# Patient Record
Sex: FEMALE | Race: BLACK OR AFRICAN AMERICAN | Employment: FULL TIME | URBAN - METROPOLITAN AREA
[De-identification: names, ages, dates, MRNs, and addresses within clinical notes are randomized per-mention and may not be internally consistent; named-entity substitution may affect disease eponyms.]

---

## 2017-01-24 ENCOUNTER — OFFICE VISIT (OUTPATIENT)
Dept: FAMILY MEDICINE CLINIC | Age: 52
End: 2017-01-24

## 2017-01-24 VITALS
HEIGHT: 66 IN | DIASTOLIC BLOOD PRESSURE: 84 MMHG | TEMPERATURE: 97 F | WEIGHT: 180 LBS | BODY MASS INDEX: 28.93 KG/M2 | RESPIRATION RATE: 16 BRPM | SYSTOLIC BLOOD PRESSURE: 120 MMHG | HEART RATE: 67 BPM | OXYGEN SATURATION: 100 %

## 2017-01-24 DIAGNOSIS — J06.9 VIRAL UPPER RESPIRATORY TRACT INFECTION: ICD-10-CM

## 2017-01-24 DIAGNOSIS — I10 ESSENTIAL HYPERTENSION: Primary | ICD-10-CM

## 2017-01-24 DIAGNOSIS — R73.9 ELEVATED BLOOD SUGAR: ICD-10-CM

## 2017-01-24 DIAGNOSIS — Z13.220 SCREENING FOR CHOLESTEROL LEVEL: ICD-10-CM

## 2017-01-24 RX ORDER — CHLORTHALIDONE 25 MG/1
TABLET ORAL DAILY
COMMUNITY
End: 2017-03-22

## 2017-01-24 RX ORDER — RAMIPRIL 10 MG/1
10 CAPSULE ORAL DAILY
Qty: 30 CAP | Refills: 6 | Status: SHIPPED | OUTPATIENT
Start: 2017-01-24 | End: 2017-01-24 | Stop reason: SDUPTHER

## 2017-01-24 NOTE — PROGRESS NOTES
HISTORY OF PRESENT ILLNESS  Fer Parr is a 54 y.o. female here for follow-up on hypertension hyperglycemia and hyperlipidemia. . [facility] 25 mg daily. She is currently complaining of some nasal congestion and sore throat   Cholesterol Problem   The history is provided by the medical records. This is a chronic problem. The problem has not changed since onset. Pertinent negatives include no chest pain, no abdominal pain, no headaches and no shortness of breath. The symptoms are aggravated by eating. Nothing relieves the symptoms. She has tried nothing for the symptoms. Hypertension    The history is provided by the patient and medical records. This is a chronic problem. The problem has been gradually improving. Pertinent negatives include no chest pain, no orthopnea, no headaches, no neck pain, no peripheral edema, no dizziness, no shortness of breath and no nausea. There are no associated agents to hypertension. Risk factors include dyslipidemia. High Blood Sugar   The history is provided by the patient and medical records. This is a chronic problem. The problem has not changed since onset. Pertinent negatives include no chest pain, no abdominal pain, no headaches and no shortness of breath. The symptoms are aggravated by eating. Nothing relieves the symptoms. She has tried nothing for the symptoms. URI    The history is provided by the patient and medical records. This is a chronic problem. The problem has been gradually improving. Associated symptoms include sneezing and sore throat. Pertinent negatives include no chest pain, no abdominal pain, no diarrhea, no nausea, no ear pain, no headaches, no joint swelling and no neck pain. She has tried nothing for the symptoms.    No Known Allergies  Current Outpatient Prescriptions on File Prior to Visit   Medication Sig Dispense Refill    dicyclomine (BENTYL) 20 mg tablet Take 1 Tab by mouth three (3) times daily as needed. 90 Tab 2    naproxen (NAPROSYN) 500 mg tablet Take 1 Tab by mouth two (2) times daily (with meals). 14 Tab 0     No current facility-administered medications on file prior to visit. Past Medical History   Diagnosis Date    Elevated blood pressure     Hypercholesterolemia     IBS (irritable bowel syndrome)      Past Surgical History   Procedure Laterality Date    Hx cholecystectomy      Hx hysterectomy       Family History   Problem Relation Age of Onset    Hypertension Mother     Diabetes Mother     Elevated Lipids Mother     Hypertension Father    Zelda Miners Elevated Lipids Father     Hypertension Sister     Diabetes Sister     Hypertension Brother     Hypertension Sister     Elevated Lipids Sister      Social History     Social History    Marital status:      Spouse name: N/A    Number of children: N/A    Years of education: N/A     Occupational History    Not on file. Social History Main Topics    Smoking status: Never Smoker    Smokeless tobacco: Never Used    Alcohol use No    Drug use: No    Sexual activity: Yes     Partners: Male     Other Topics Concern    Not on file     Social History Narrative     . Review of Systems   Constitutional: Negative. HENT: Positive for sneezing and sore throat. Negative for ear pain. Respiratory: Negative. Negative for shortness of breath. Cardiovascular: Negative. Negative for chest pain and orthopnea. Gastrointestinal: Negative for abdominal pain, diarrhea and nausea. Musculoskeletal: Negative. Negative for neck pain. Neurological: Negative. Negative for dizziness and headaches. Endo/Heme/Allergies: Negative. Psychiatric/Behavioral: Negative.       Visit Vitals    /84 (BP 1 Location: Right arm, BP Patient Position: Sitting)    Pulse 67    Temp 97 °F (36.1 °C) (Oral)    Resp 16    Ht 5' 5.75\" (1.67 m)    Wt 180 lb (81.6 kg)    SpO2 100%    BMI 29.27 kg/m2       Physical Exam Constitutional: She is oriented to person, place, and time. She appears well-developed and well-nourished. HENT:   Head: Normocephalic and atraumatic. Cardiovascular: Normal rate, regular rhythm, normal heart sounds and intact distal pulses. Exam reveals no gallop and no friction rub. No murmur heard. Pulmonary/Chest: Effort normal and breath sounds normal. No respiratory distress. She has no wheezes. She has no rales. Musculoskeletal: She exhibits no edema. Neurological: She is alert and oriented to person, place, and time. No cranial nerve deficit. Psychiatric: She has a normal mood and affect. Her behavior is normal. Judgment and thought content normal.   Nursing note and vitals reviewed. ASSESSMENT and PLAN    ICD-10-CM ICD-9-CM    1. Essential hypertension I10 401.9    2. Elevated blood sugar R73.9 790.29    3. Screening for cholesterol level Z13.220 V77.91    4. Viral upper respiratory tract infection J06.9 465.9     B97.89       Follow-up Disposition:  Return in about 4 weeks (around 2/21/2017).

## 2017-01-24 NOTE — MR AVS SNAPSHOT
Visit Information Date & Time Provider Department Dept. Phone Encounter #  
 1/24/2017  3:00 PM Moses Finnegan MD [facility] [phone] [id number] Follow-up Instructions Return in about 4 weeks (around 2/21/2017). Upcoming Health Maintenance Date Due DTaP/Tdap/Td series (1 - Tdap) 7/17/1988 PAP AKA CERVICAL CYTOLOGY 7/17/1988 Allergies as of 1/24/2017  Review Complete On: 1/24/2017 By: Moses Finnegan MD  
 No Known Allergies Current Immunizations  Never Reviewed No immunizations on file. Not reviewed this visit You Were Diagnosed With   
  
 Codes Comments Essential hypertension    -  Primary ICD-10-CM: I10 
ICD-9-CM: 401.9 Elevated blood sugar     ICD-10-CM: R73.9 ICD-9-CM: 790.29 Screening for cholesterol level     ICD-10-CM: Z13.220 ICD-9-CM: V77.91 Viral upper respiratory tract infection     ICD-10-CM: J06.9, B97.89 ICD-9-CM: 465.9 Vitals BP Pulse Temp Resp Height(growth percentile) Weight(growth percentile) 120/84 (BP 1 Location: Right arm, BP Patient Position: Sitting) 67 97 °F (36.1 °C) (Oral) 16 5' 5.75\" (1.67 m) 180 lb (81.6 kg) SpO2 BMI OB Status Smoking Status 100% 29.27 kg/m2 Menopause Never Smoker Vitals History BMI and BSA Data Body Mass Index Body Surface Area  
 29.27 kg/m 2 1.95 m 2 Preferred Pharmacy Pharmacy Name Phone [facility] [phone] [facility] 18 [phone] Your Updated Medication List  
  
   
This list is accurate as of: 1/24/17  4:10 PM.  Always use your most recent med list.  
  
  
  
  
 chlorthalidone 25 mg tablet Commonly known as:  Tiffany Pulling Take  by mouth daily. dicyclomine 20 mg tablet Commonly known as:  BENTYL Take 1 Tab by mouth three (3) times daily as needed. naproxen 500 mg tablet Commonly known as:  NAPROSYN Take 1 Tab by mouth two (2) times daily (with meals). ramipril 10 mg capsule Commonly known as:  ALTACE Take 1 Cap by mouth daily. Prescriptions Sent to Pharmacy Refills  
 ramipril (ALTACE) 10 mg capsule 6 Sig: Take 1 Cap by mouth daily. Class: Normal  
 Pharmacy: [facility] [address] Ph #: [phone] Route: Oral  
  
Follow-up Instructions Return in about 4 weeks (around 2/21/2017). Patient Instructions Purchase Coricidin HBP for nasal congestion Introducing [facility]! Francis Jose Angeljovan introduces [employer] patient portal. Now you can access parts of your medical record, email your doctor's office, and request medication refills online. 1. In your internet browser, go to [email or web address] 2. Click on the First Time User? Click Here link in the Sign In box. You will see the New Member Sign Up page. 3. Enter your Nit[employer]x Access Code exactly as it appears below. You will not need to use this code after youve completed the sign-up process. If you do not sign up before the expiration date, you must request a new code. · [employer]x Access Code: Karon Chamorro Expires: 2/5/2017  6:34 PM 
 
4. Enter the last four digits of your Social Security Number (xxxx) and Date of Birth (mm/dd/yyyy) as indicated and click Submit. You will be taken to the next sign-up page. 5. Create a "[email or web address]"t ID. This will be your Nitronex login ID and cannot be changed, so think of one that is secure and easy to remember. 6. Create a N[employer]x password. You can change your password at any time. 7. Enter your Password Reset Question and Answer. This can be used at a later time if you forget your password. 8. Enter your e-mail address. You will receive e-mail notification when new information is available in [address] 9. Click Sign Up.  You can now view and download portions of your medical record. 10. Click the Download Summary menu link to download a portable copy of your medical information. If you have questions, please visit the Frequently Asked Questions section of the [employer] website. Remember, [employer] is NOT to be used for urgent needs. For medical emergencies, dial 911. Now available from your iPhone and Android! Please provide this summary of care documentation to your next provider. Your primary care clinician is listed as Arlene Coronado. If you have any questions after today's visit, please call [phone].

## 2017-01-24 NOTE — PROGRESS NOTES
Amber Romo is a 54 y.o. female presents to office for follow up on high blood sugar, HTN and hyperlipidemia. 1. Have you been to the ER, urgent care clinic or hospitalized since your last visit? Yes 1/12/2017 for high blood pressure  2. Have you seen any other providers outside of [facility] since your last visit? no  3. Have you had a Flu shot this year?  No-Patient declined        Health Maintenance items with a due date reviewed with patient:  Health Maintenance Due   Topic Date Due    DTaP/Tdap/Td series (1 - Tdap) 07/17/1988    PAP AKA CERVICAL CYTOLOGY  07/17/1988    INFLUENZA AGE 9 TO ADULT  08/01/2016

## 2017-01-25 LAB
A-G RATIO,AGRAT: 1.3 RATIO (ref 1.1–2.6)
ABSOLUTE LYMPHOCYTE COUNT, 10803: 2.4 K/UL (ref 1–4.8)
ALBUMIN SERPL-MCNC: 4.3 G/DL (ref 3.5–5)
ALP SERPL-CCNC: 88 U/L (ref 25–115)
ALT SERPL-CCNC: 11 U/L (ref 5–40)
ANION GAP SERPL CALC-SCNC: 17 MMOL/L
AST SERPL W P-5'-P-CCNC: 18 U/L (ref 10–37)
AVG GLU, 10930: 135 MG/DL (ref 91–123)
BASOPHILS # BLD: 0 K/UL (ref 0–0.2)
BASOPHILS NFR BLD: 0 % (ref 0–2)
BILIRUB SERPL-MCNC: 0.3 MG/DL (ref 0.2–1.2)
BUN SERPL-MCNC: 19 MG/DL (ref 6–22)
CALCIUM SERPL-MCNC: 9.4 MG/DL (ref 8.4–10.5)
CHLORIDE SERPL-SCNC: 96 MMOL/L (ref 98–110)
CHOLEST SERPL-MCNC: 290 MG/DL (ref 110–200)
CO2 SERPL-SCNC: 27 MMOL/L (ref 20–32)
CREAT SERPL-MCNC: 0.7 MG/DL (ref 0.5–1.2)
EOSINOPHIL # BLD: 0.1 K/UL (ref 0–0.5)
EOSINOPHIL NFR BLD: 1 % (ref 0–6)
ERYTHROCYTE [DISTWIDTH] IN BLOOD BY AUTOMATED COUNT: 14.6 % (ref 10–16)
GFRAA, 66117: >60
GFRNA, 66118: >60
GLOBULIN,GLOB: 3.2 G/DL (ref 2–4)
GLUCOSE SERPL-MCNC: 89 MG/DL (ref 65–99)
GRANULOCYTES,GRANS: 41 % (ref 40–75)
HBA1C MFR BLD HPLC: 6.3 % (ref 4.8–5.9)
HCT VFR BLD AUTO: 35 % (ref 35.1–48)
HDLC SERPL-MCNC: 80 MG/DL (ref 40–59)
HGB BLD-MCNC: 11.5 G/DL (ref 11.7–16)
LDLC SERPL CALC-MCNC: 194 MG/DL (ref 50–99)
LYMPHOCYTES, LYMLT: 51 % (ref 27–45)
MCH RBC QN AUTO: 27 PG (ref 26–34)
MCHC RBC AUTO-ENTMCNC: 33 G/DL (ref 32–36)
MCV RBC AUTO: 82 FL (ref 80–95)
MONOCYTES # BLD: 0.3 K/UL (ref 0.1–0.9)
MONOCYTES NFR BLD: 7 % (ref 3–9)
NEUTROPHILS # BLD AUTO: 1.9 K/UL (ref 1.8–7.7)
PLATELET # BLD AUTO: 192 K/UL (ref 140–440)
PMV BLD AUTO: 11.3 FL (ref 6–10.8)
POTASSIUM SERPL-SCNC: 3.4 MMOL/L (ref 3.5–5.5)
PROT SERPL-MCNC: 7.5 G/DL (ref 6.4–8.3)
RBC # BLD AUTO: 4.29 M/UL (ref 3.8–5.2)
SODIUM SERPL-SCNC: 140 MMOL/L (ref 133–145)
TRIGL SERPL-MCNC: 80 MG/DL (ref 40–149)
VLDLC SERPL CALC-MCNC: 16 MG/DL (ref 8–30)
WBC # BLD AUTO: 4.8 K/UL (ref 4–11)

## 2017-03-01 ENCOUNTER — OFFICE VISIT (OUTPATIENT)
Dept: FAMILY MEDICINE CLINIC | Age: 52
End: 2017-03-01

## 2017-03-01 VITALS
RESPIRATION RATE: 18 BRPM | BODY MASS INDEX: 28.77 KG/M2 | WEIGHT: 179 LBS | OXYGEN SATURATION: 98 % | TEMPERATURE: 98 F | DIASTOLIC BLOOD PRESSURE: 100 MMHG | HEIGHT: 66 IN | SYSTOLIC BLOOD PRESSURE: 158 MMHG

## 2017-03-01 DIAGNOSIS — E78.5 HYPERLIPIDEMIA, UNSPECIFIED HYPERLIPIDEMIA TYPE: ICD-10-CM

## 2017-03-01 DIAGNOSIS — I10 ESSENTIAL HYPERTENSION: Primary | ICD-10-CM

## 2017-03-01 DIAGNOSIS — R73.9 ELEVATED BLOOD SUGAR: ICD-10-CM

## 2017-03-01 RX ORDER — SIMVASTATIN 40 MG/1
40 TABLET, FILM COATED ORAL
Qty: 30 TAB | Refills: 6 | Status: SHIPPED | OUTPATIENT
Start: 2017-03-01 | End: 2017-03-01 | Stop reason: SDUPTHER

## 2017-03-01 RX ORDER — LOSARTAN POTASSIUM 100 MG/1
100 TABLET ORAL DAILY
Qty: 30 TAB | Refills: 6 | Status: SHIPPED | OUTPATIENT
Start: 2017-03-01 | End: 2017-03-01 | Stop reason: SDUPTHER

## 2017-03-01 NOTE — PROGRESS NOTES
1. Have you been to the ER, urgent care clinic since your last visit? Hospitalized since your last visit?no    2. Have you seen or consulted any other health care providers outside of the [address] [place] [address] since your last visit? Include any pap smears or colon screening. No    Patient Biancamelquiades Parr is a 54 y.o. female presents today for htn f/u.

## 2017-03-01 NOTE — PROGRESS NOTES
HISTORY OF PRESENT ILLNESS  Lucian Melendez is a 54 y.o. female here for follow-up on hypertension. Patient is also noted to have an LDL cholesterol of 194 and a hemoglobin A1c of 6.3. . Blood pressure remains elevated on Altace. Hypertension    The history is provided by the patient and medical records. This is a chronic problem. The problem has not changed since onset. Pertinent negatives include no chest pain, no orthopnea, no headaches, no peripheral edema, no dizziness and no shortness of breath. There are no associated agents to hypertension. Risk factors include dyslipidemia. Cholesterol Problem   The history is provided by the patient. The problem has not changed since onset. Pertinent negatives include no chest pain, no headaches and no shortness of breath. The symptoms are aggravated by eating. Nothing relieves the symptoms. Blood sugar problem   The history is provided by the medical records. This is a chronic problem. The problem has not changed since onset. Pertinent negatives include no chest pain, no headaches and no shortness of breath. The symptoms are aggravated by eating. Nothing relieves the symptoms. She has tried nothing for the symptoms. No Known Allergies  Current Outpatient Prescriptions on File Prior to Visit   Medication Sig Dispense Refill    ramipril (ALTACE) 10 mg capsule TAKE 1 CAPSULE BY MOUTH DAILY 90 Cap 1    dicyclomine (BENTYL) 20 mg tablet Take 1 Tab by mouth three (3) times daily as needed. 90 Tab 2    chlorthalidone (HYGROTEN) 25 mg tablet Take  by mouth daily. No current facility-administered medications on file prior to visit.       Past Medical History:   Diagnosis Date    Elevated blood pressure     Hypercholesterolemia     IBS (irritable bowel syndrome)      Past Surgical History:   Procedure Laterality Date    HX CHOLECYSTECTOMY      HX HYSTERECTOMY       Family History   Problem Relation Age of Onset    Hypertension Mother    Gray Ferreira Diabetes Mother    Gray Ferreira Elevated Lipids Mother     Hypertension Father    [facility] Elevated Lipids Father     Hypertension Sister     Diabetes Sister     Hypertension Brother     Hypertension Sister     Elevated Lipids Sister      Social History     Social History    Marital status:      Spouse name: N/A    Number of children: N/A    Years of education: N/A     Occupational History    Not on file. Social History Main Topics    Smoking status: Never Smoker    Smokeless tobacco: Never Used    Alcohol use No    Drug use: No    Sexual activity: Yes     Partners: Male     Other Topics Concern    Not on file     Social History Narrative         Review of Systems   Constitutional: Negative. Respiratory: Negative. Negative for shortness of breath. Cardiovascular: Negative. Negative for chest pain and orthopnea. Musculoskeletal: Negative. Neurological: Negative. Negative for dizziness and headaches. Endo/Heme/Allergies: Negative. Psychiatric/Behavioral: Negative. Visit Vitals    BP (!) 158/100    Temp 98 °F (36.7 °C) (Oral)    Resp 18    Ht 5' 5.75\" (1.67 m)    Wt 179 lb (81.2 kg)    SpO2 98%    BMI 29.11 kg/m2         Physical Exam   Constitutional: She is oriented to person, place, and time. She appears well-developed and well-nourished. HENT:   Head: Normocephalic and atraumatic. Cardiovascular: Normal rate, regular rhythm, normal heart sounds and intact distal pulses. Exam reveals no gallop and no friction rub. No murmur heard. Pulmonary/Chest: Effort normal and breath sounds normal. No respiratory distress. She has no wheezes. Musculoskeletal: She exhibits no edema. Neurological: She is alert and oriented to person, place, and time. No cranial nerve deficit. Psychiatric: She has a normal mood and affect. Her behavior is normal. Judgment and thought content normal.   Nursing note and vitals reviewed. ASSESSMENT and PLAN    ICD-10-CM ICD-9-CM    1.  Essential hypertension I10 401.9 losartan (COZAAR) 100 mg tablet   2. Elevated blood sugar R73.9 790.29 glucose blood VI test strips (ONETOUCH VERIO) strip      Lancets (ACCU-CHEK SOFTCLIX LANCETS) misc   3.  Hyperlipidemia, unspecified hyperlipidemia type E78.5 272.4 simvastatin (ZOCOR) 40 mg tablet     Follow-up Disposition: Not on File  the following changes in treatment are made: Patient has been advised to discontinue Altace and start losartan 100 mg once daily

## 2017-03-01 NOTE — MR AVS SNAPSHOT
Visit Information Date & Time Provider Department Dept. Phone Encounter #  
 3/1/2017  5:15 PM Flores Allen MD [facility] [phone] [id number] Upcoming Health Maintenance Date Due DTaP/Tdap/Td series (1 - Tdap) 7/17/1988 PAP AKA CERVICAL CYTOLOGY 7/17/1988 Allergies as of 3/1/2017  Review Complete On: 3/1/2017 By: Flores Allen MD  
 No Known Allergies Current Immunizations  Never Reviewed No immunizations on file. Not reviewed this visit You Were Diagnosed With   
  
 Codes Comments Essential hypertension    -  Primary ICD-10-CM: I10 
ICD-9-CM: 401.9 Elevated blood sugar     ICD-10-CM: R73.9 ICD-9-CM: 790.29 Hyperlipidemia, unspecified hyperlipidemia type     ICD-10-CM: E78.5 ICD-9-CM: 272.4 Vitals BP  
  
  
  
  
  
 (!) 158/100 Vitals History BMI and BSA Data Body Mass Index Body Surface Area  
 29.11 kg/m 2 1.94 m 2 Preferred Pharmacy Pharmacy Name Phone [facility] [address] [place], [place] [address] [place] [address] [phone] Your Updated Medication List  
  
   
This list is accurate as of: 3/1/17  6:28 PM.  Always use your most recent med list.  
  
  
  
  
 chlorthalidone 25 mg tablet Commonly known as:  Marnette Castellani Take  by mouth daily. dicyclomine 20 mg tablet Commonly known as:  BENTYL Take 1 Tab by mouth three (3) times daily as needed. losartan 100 mg tablet Commonly known as:  COZAAR Take 1 Tab by mouth daily. ramipril 10 mg capsule Commonly known as:  ALTACE  
TAKE 1 CAPSULE BY MOUTH DAILY  
  
 simvastatin 40 mg tablet Commonly known as:  ZOCOR Take 1 Tab by mouth nightly. Prescriptions Sent to Pharmacy Refills  
 losartan (COZAAR) 100 mg tablet 6 Sig: Take 1 Tab by mouth daily.   
 Class: Normal  
 Pharmacy: [facility] [address] [place] [address] [facility] [address] Ph #: [phone] Route: Oral  
 simvastatin (ZOCOR) 40 mg tablet 6 Sig: Take 1 Tab by mouth nightly. Class: Normal  
 Pharmacy: [facility]  [address] Ph #: [phone] Route: Oral  
  
Introducing [facility]! Nola Crowe introduces [employer] patient portal. Now you can access parts of your medical record, email your doctor's office, and request medication refills online. 1. In your internet browser, go to [email or web address] 2. Click on the First Time User? Click Here link in the Sign In box. You will see the New Member Sign Up page. 3. Enter your [employer] Access Code exactly as it appears below. You will not need to use this code after youve completed the sign-up process. If you do not sign up before the expiration date, you must request a new code. · [employer] Access Code: [email or web address] Expires: 5/30/2017  6:28 PM 
 
4. Enter the last four digits of your Social Security Number (xxxx) and Date of Birth (mm/dd/yyyy) as indicated and click Submit. You will be taken to the next sign-up page. 5. Create a [employer] ID. This will be your [employer] login ID and cannot be changed, so think of one that is secure and easy to remember. 6. Create a [employer] password. You can change your password at any time. 7. Enter your Password Reset Question and Answer. This can be used at a later time if you forget your password. 8. Enter your e-mail address. You will receive e-mail notification when new information is available in [address] 9. Click Sign Up. You can now view and download portions of your medical record. 10. Click the Download Summary menu link to download a portable copy of your medical information. If you have questions, please visit the Frequently Asked Questions section of the [employer] website.  Remember, [employer] is NOT to be used for urgent needs. For medical emergencies, dial 911. Now available from your iPhone and Android! Please provide this summary of care documentation to your next provider. Your primary care clinician is listed as Matt Osman. If you have any questions after today's visit, please call [phone].

## 2017-03-04 RX ORDER — LOSARTAN POTASSIUM 100 MG/1
TABLET ORAL
Qty: 90 TAB | Refills: 6 | Status: SHIPPED | OUTPATIENT
Start: 2017-03-04 | End: 2017-04-06 | Stop reason: DRUGHIGH

## 2017-03-04 RX ORDER — LANCETS
EACH MISCELLANEOUS
Qty: 100 EACH | Refills: 1 | Status: SHIPPED | OUTPATIENT
Start: 2017-03-04 | End: 2018-01-18 | Stop reason: SDUPTHER

## 2017-03-04 RX ORDER — SIMVASTATIN 40 MG/1
TABLET, FILM COATED ORAL
Qty: 90 TAB | Refills: 6 | Status: SHIPPED | OUTPATIENT
Start: 2017-03-04 | End: 2017-04-06

## 2017-03-06 ENCOUNTER — TELEPHONE (OUTPATIENT)
Dept: FAMILY MEDICINE CLINIC | Age: 52
End: 2017-03-06

## 2017-03-06 NOTE — TELEPHONE ENCOUNTER
Received call from pt inquiring if she can take otc zyrtec for sinus infection. Per Dr. Retana Foil she can take coricidin hbp and nasacort 24/ flonase. Pt stated understanding.

## 2017-03-15 ENCOUNTER — TELEPHONE (OUTPATIENT)
Dept: FAMILY MEDICINE CLINIC | Age: 52
End: 2017-03-15

## 2017-03-16 NOTE — TELEPHONE ENCOUNTER
Pharmacy has been notified and DWO form faxed to our office. Form has been completed and faxed back to pharmacy for test strips. Closing encounter.

## 2017-03-20 ENCOUNTER — TELEPHONE (OUTPATIENT)
Dept: FAMILY MEDICINE CLINIC | Age: 52
End: 2017-03-20

## 2017-03-20 NOTE — TELEPHONE ENCOUNTER
Transition of Care call placed to patient. Admission and discharge date:  03/17-03/19/2017    Admission diagnosis:    Patient had headaches in the morning and then 150/100 elevated blood pressure at 10 am. Pt was at work helping a patient. Her face started getting hot. She had trouble articulating and had slurred speech. Repeat pressure was slightly more elevated. Facility admitted to: [facility]    Discharge Diagnosis: r/o stroke; Dx Slurred Speech, Hypertension    Medications to discontinue: none    New medications: Lorazepam 1 mg given as needed for claustrophobia during scans; Started on 162 mg EC Aspirin once daily. Questions:  \"Just not sure how this happened. \"    Patient SHIVAM appointment scheduled for:  03/22/2017 @ 345pm with Dr. Anthony Saldaña

## 2017-03-22 ENCOUNTER — OFFICE VISIT (OUTPATIENT)
Dept: FAMILY MEDICINE CLINIC | Age: 52
End: 2017-03-22

## 2017-03-22 VITALS
RESPIRATION RATE: 18 BRPM | HEART RATE: 68 BPM | BODY MASS INDEX: 28.45 KG/M2 | SYSTOLIC BLOOD PRESSURE: 132 MMHG | HEIGHT: 66 IN | TEMPERATURE: 96.2 F | WEIGHT: 177 LBS | DIASTOLIC BLOOD PRESSURE: 82 MMHG

## 2017-03-22 DIAGNOSIS — E78.5 HYPERLIPIDEMIA, UNSPECIFIED HYPERLIPIDEMIA TYPE: ICD-10-CM

## 2017-03-22 DIAGNOSIS — I10 ESSENTIAL HYPERTENSION: ICD-10-CM

## 2017-03-22 DIAGNOSIS — G45.9 TRANSIENT CEREBRAL ISCHEMIA, UNSPECIFIED TYPE: Primary | ICD-10-CM

## 2017-03-22 DIAGNOSIS — R73.9 ELEVATED BLOOD SUGAR: ICD-10-CM

## 2017-03-22 DIAGNOSIS — G44.59 OTHER COMPLICATED HEADACHE SYNDROME: ICD-10-CM

## 2017-03-22 RX ORDER — PRAVASTATIN SODIUM 40 MG/1
40 TABLET ORAL
Qty: 30 TAB | Refills: 6 | Status: SHIPPED | OUTPATIENT
Start: 2017-03-22 | End: 2017-03-22 | Stop reason: SDUPTHER

## 2017-03-22 RX ORDER — VERAPAMIL HYDROCHLORIDE 100 MG/1
100 CAPSULE, EXTENDED RELEASE ORAL
Qty: 30 CAP | Refills: 6 | Status: SHIPPED | OUTPATIENT
Start: 2017-03-22 | End: 2017-03-23 | Stop reason: SDUPTHER

## 2017-03-22 NOTE — LETTER
NOTIFICATION RETURN TO WORK / SCHOOL 
 
3/22/2017 5:22 PM 
 
Ms. Sherlyn Fall [address] [address] To Whom It May Concern: Sherlyn Fall is currently under the care of [address] [place] [address]. Ms. Dewey Castillo may return to work 3/23/17. If there are questions or concerns please have the patient contact our office. Sincerely, Marina Keller MD

## 2017-03-22 NOTE — MR AVS SNAPSHOT
Visit Information Date & Time Provider Department Dept. Phone Encounter #  
 3/22/2017  3:45 PM Marta Rodriguez MD [facility] [phone] [id number] Follow-up Instructions Return in about 1 week (around 3/29/2017). Your Appointments 4/6/2017  5:15 PM  
Follow Up with Marta Rodriguez MD  
[facility]) Appt Note: 4 week f/u  
 [address] [facility] [address] [place] [address]  
[phone]  
  
   
 [address] [id number] Upcoming Health Maintenance Date Due  
 PAP AKA CERVICAL CYTOLOGY 11/22/2019 DTaP/Tdap/Td series (2 - Td) 3/22/2027 Allergies as of 3/22/2017  Review Complete On: 3/22/2017 By: Marta Rodriguez MD  
 No Known Allergies Current Immunizations  Never Reviewed No immunizations on file. Not reviewed this visit You Were Diagnosed With   
  
 Codes Comments Transient cerebral ischemia, unspecified type    -  Primary ICD-10-CM: G45.9 ICD-9-CM: 435.9 Essential hypertension     ICD-10-CM: I10 
ICD-9-CM: 401.9 Other complicated headache syndrome     ICD-10-CM: G44.59 
ICD-9-CM: 339.44 Hyperlipidemia, unspecified hyperlipidemia type     ICD-10-CM: E78.5 ICD-9-CM: 272.4 Elevated blood sugar     ICD-10-CM: R73.9 ICD-9-CM: 790.29 Vitals BP Pulse Temp Resp Height(growth percentile) Weight(growth percentile) 132/82 68 96.2 °F (35.7 °C) (Oral) 18 5' 5.75\" (1.67 m) 177 lb (80.3 kg) BMI OB Status Smoking Status 28.79 kg/m2 Menopause Never Smoker Vitals History BMI and BSA Data Body Mass Index Body Surface Area 28.79 kg/m 2 1.93 m 2 Preferred Pharmacy Pharmacy Name Phone [facility] [address], [address] [place] [address] [phone] Your Updated Medication List  
  
   
This list is accurate as of: 3/22/17  5:18 PM.  Always use your most recent med list.  
  
  
  
  
 dicyclomine 20 mg tablet Commonly known as:  BENTYL Take 1 Tab by mouth three (3) times daily as needed. glucose blood VI test strips strip Commonly known as:  Marzette Nova Pt to test bloos sugar once daily Lancets Misc Commonly known as:  ACCU-CHEK SOFTCLIX LANCETS Pt to test blood sugar once daily  
  
 losartan 100 mg tablet Commonly known as:  COZAAR  
TAKE 1 TABLET BY MOUTH DAILY pravastatin 40 mg tablet Commonly known as:  PRAVACHOL Take 1 Tab by mouth nightly. simvastatin 40 mg tablet Commonly known as:  ZOCOR  
TAKE 1 TABLET BY MOUTH EVERY NIGHT  
  
 verapamil  mg capsule Commonly known as:  VERELAN PM  
Take 1 Cap by mouth nightly. Prescriptions Sent to Pharmacy Refills  
 verapamil ER (VERELAN PM) 100 mg capsule 6 Sig: Take 1 Cap by mouth nightly. Class: Normal  
 [facility] [employer] [facility] [address] Ph #: [phone] Route: Oral  
 pravastatin (PRAVACHOL) 40 mg tablet 6 Sig: Take 1 Tab by mouth nightly. Class: Normal  
 Pharmacy: [facility]  [facility] [address] Ph #: [phone] Route: Oral  
  
We Performed the Following REFERRAL TO NEUROLOGY [UFG40 Custom] Comments:  
 Please evaluate patient for recurrent TIAs. Refer to Dr. Betzy Morin. Follow-up Instructions Return in about 1 week (around 3/29/2017). Referral Information Referral ID Referred By Referred To  
  
 [id number] Gabriel RODRIGUEZ Not Available Visits Status Start Date End Date 1 New Request 3/22/17 3/22/18 If your referral has a status of pending review or denied, additional information will be sent to support the outcome of this decision. Patient Instructions Break losartan in half and take 50 mg daily Stop Zocor Start Pravachol 40 mg daily Introducing [facility]! Gay Snow introduces [employer] patient portal. Now you can access parts of your medical record, email your doctor's office, and request medication refills online. 1. In your internet browser, go to [email or web address] 2. Click on the First Time User? Click Here link in the Sign In box. You will see the New Member Sign Up page. 3. Enter your [employer] Access Code exactly as it appears below. You will not need to use this code after youve completed the sign-up process. If you do not sign up before the expiration date, you must request a new code. · [employer] Access Code: [email or web address] Expires: 5/30/2017  7:28 PM 
 
4. Enter the last four digits of your Social Security Number (xxxx) and Date of Birth (mm/dd/yyyy) as indicated and click Submit. You will be taken to the next sign-up page. 5. Create a Cubresa ID. This will be your Cubresa login ID and cannot be changed, so think of one that is secure and easy to remember. 6. Create a Cubresa password. You can change your password at any time. 7. Enter your Password Reset Question and Answer. This can be used at a later time if you forget your password. 8. Enter your e-mail address. You will receive e-mail notification when new information is available in [address] 9. Click Sign Up. You can now view and download portions of your medical record. 10. Click the Download Summary menu link to download a portable copy of your medical information. If you have questions, please visit the Frequently Asked Questions section of the [employer] website. Remember, [employer] is NOT to be used for urgent needs. For medical emergencies, dial 911. Now available from your iPhone and Android! Please provide this summary of care documentation to your next provider. Your primary care clinician is listed as Mechelle Dears.  If you have any questions after today's visit, please call [phone].

## 2017-03-22 NOTE — PROGRESS NOTES
HISTORY OF PRESENT ILLNESS  Nael Hernandez is a 54 y.o. female here for follow-up after discharge from the hospital for questionable TIA headache and hypertension. Patient states that 5 days ago she developed a headache followed by right-sided facial drooping and slurred speech. She was admitted into the hospital for 48 hours. The right sided facial drooping and slurred speech recurred ×2 in the hospital.  During the hospital stay she did not have headache prior to right-sided facial drooping. Patient has had no further episodes since discharge from the hospital.  TIA   The history is provided by the patient and medical records. This is a recurrent problem. The problem has been resolved. There was right facial focality noted. Primary symptoms include focal weakness, slurred speech and speech difficulty. Pertinent negatives include no loss of sensation, no loss of balance, no memory loss, no movement disorder, no agitation, no visual change, no auditory change, no mental status change, no unresponsiveness and no disorientation. There has been no fever. Associated symptoms include headaches. Pertinent negatives include no shortness of breath, no chest pain, no vomiting, no altered mental status, no confusion, no choking, no nausea, no bowel incontinence and no bladder incontinence. Associated medical issues do not include trauma, mood changes, bleeding disorder, seizures, dementia, CVA or clotting disorder. Hypertension    The history is provided by the patient and medical records. This is a chronic problem. The problem has been gradually improving. Associated symptoms include headaches. Pertinent negatives include no chest pain, no orthopnea, no confusion, no blurred vision, no peripheral edema, no dizziness, no shortness of breath, no nausea and no vomiting. There are no associated agents to hypertension. Risk factors include dyslipidemia. Headache   The history is provided by the patient.  This is a chronic problem. The problem has not changed since onset. Associated symptoms include headaches. Pertinent negatives include no chest pain and no shortness of breath. Nothing aggravates the symptoms. Nothing relieves the symptoms. She has tried nothing for the symptoms. Cholesterol Problem   The history is provided by the patient and medical records. This is a chronic problem. Associated symptoms include headaches. Pertinent negatives include no chest pain and no shortness of breath. The symptoms are aggravated by eating. The symptoms are relieved by medications. Treatments tried: Pravachol. Blood sugar problem   The history is provided by the medical records. This is a chronic problem. The problem has not changed since onset. Associated symptoms include headaches. Pertinent negatives include no chest pain and no shortness of breath. The symptoms are aggravated by eating. Nothing relieves the symptoms. She has tried nothing for the symptoms. No Known Allergies  Current Outpatient Prescriptions on File Prior to Visit   Medication Sig Dispense Refill    losartan (COZAAR) 100 mg tablet TAKE 1 TABLET BY MOUTH DAILY 90 Tab 6    simvastatin (ZOCOR) 40 mg tablet TAKE 1 TABLET BY MOUTH EVERY NIGHT 90 Tab 6    glucose blood VI test strips (ONETOUCH VERIO) strip Pt to test bloos sugar once daily 100 Strip 1    Lancets (ACCU-CHEK SOFTCLIX LANCETS) misc Pt to test blood sugar once daily 100 Each 1    dicyclomine (BENTYL) 20 mg tablet Take 1 Tab by mouth three (3) times daily as needed. 90 Tab 2     No current facility-administered medications on file prior to visit.       Past Medical History:   Diagnosis Date    Elevated blood pressure     Hypercholesterolemia     IBS (irritable bowel syndrome)      Past Surgical History:   Procedure Laterality Date    HX CHOLECYSTECTOMY      HX HYSTERECTOMY       Family History   Problem Relation Age of Onset    Hypertension Mother    [address] [facility] Diabetes Mother    [facility] Elevated Lipids Mother     Hypertension Father    Bert Gleason Elevated Lipids Father     Hypertension Sister     Diabetes Sister     Hypertension Brother     Hypertension Sister     Elevated Lipids Sister      Social History     Social History    Marital status:      Spouse name: N/A    Number of children: N/A    Years of education: N/A     Occupational History    Not on file. Social History Main Topics    Smoking status: Never Smoker    Smokeless tobacco: Never Used    Alcohol use No    Drug use: No    Sexual activity: Yes     Partners: Male     Other Topics Concern    Not on file     Social History Narrative       Review of Systems   Constitutional: Negative. Eyes: Negative. Negative for blurred vision, double vision and photophobia. Respiratory: Negative. Negative for choking and shortness of breath. Cardiovascular: Negative. Negative for chest pain and orthopnea. Gastrointestinal: Negative for bowel incontinence, nausea and vomiting. Genitourinary: Negative for bladder incontinence. Musculoskeletal: Negative. Neurological: Positive for speech change, focal weakness, speech difficulty and headaches. Negative for dizziness, seizures, loss of consciousness and loss of balance. Psychiatric/Behavioral: Negative. Negative for agitation, confusion and memory loss. Visit Vitals    /82    Pulse 68    Temp 96.2 °F (35.7 °C) (Oral)    Resp 18    Ht 5' 5.75\" (1.67 m)    Wt 177 lb (80.3 kg)    BMI 28.79 kg/m2       Physical Exam   Constitutional: She is oriented to person, place, and time. She appears well-developed and well-nourished. HENT:   Head: Normocephalic and atraumatic. Cardiovascular: Normal rate, regular rhythm, normal heart sounds and intact distal pulses. Exam reveals no gallop. No murmur heard. Pulmonary/Chest: Effort normal and breath sounds normal. No respiratory distress. She has no wheezes. She has no rales. Musculoskeletal: Normal range of motion.  She exhibits no edema. Neurological: She is alert and oriented to person, place, and time. No cranial nerve deficit. Coordination normal.   Skin: Skin is warm and dry. No rash noted. No erythema. No pallor. Psychiatric: She has a normal mood and affect. Her behavior is normal. Judgment and thought content normal.   Nursing note and vitals reviewed. ASSESSMENT and PLAN    ICD-10-CM ICD-9-CM    1. Transient cerebral ischemia, unspecified type G45.9 435.9 REFERRAL TO NEUROLOGY      verapamil ER (VERELAN PM) 100 mg capsule   2. Essential hypertension I10 401.9 verapamil ER (VERELAN PM) 100 mg capsule   3. Other complicated headache syndrome G44.59 339.44 REFERRAL TO NEUROLOGY      verapamil ER (VERELAN PM) 100 mg capsule   4. Hyperlipidemia, unspecified hyperlipidemia type E78.5 272.4    5. Elevated blood sugar R73.9 790.29      Follow-up Disposition:  Return in about 1 week (around 3/29/2017). Zocor is to be discontinued because of drug interaction with calcium channel blocker. Patient will be started on Pravachol 40 mg daily. Break losartan and half and take one half tablet i.e. 50 mg daily.

## 2017-03-22 NOTE — PROGRESS NOTES
1. Have you been to the ER, urgent care clinic since your last visit? Hospitalized since your last visit? 3/17/17 Mechelle for Stroke like symptoms    2. Have you seen or consulted any other health care providers outside of the [address] [place] [address] since your last visit? Include any pap smears or colon screening. No    Patient Bisi Monae is a 54 y.o. female presents today for SHIVAM. Health Maintenance reviewed.

## 2017-03-22 NOTE — LETTER
NOTIFICATION RETURN TO WORK  
 
3/22/2017 5:20 PM 
 
Ms. Fer Parr [address] [address] To Whom It May Concern: Fer Parr is currently under the care of [address] [place] [address]. Ms. Reagan Foster was seen in our 3/22/17. She may return to work 3/23/17. If there are questions or concerns please have the patient contact our office. Sincerely, Bernarda Contreras MD

## 2017-03-24 ENCOUNTER — TELEPHONE (OUTPATIENT)
Dept: FAMILY MEDICINE CLINIC | Age: 52
End: 2017-03-24

## 2017-03-24 NOTE — TELEPHONE ENCOUNTER
Pt is calling to let dr. Yesenia Ocampo know that she wasn't able to p/u the provastatin, was told that requires Prior-Auth. patient is concerned she just got out of the hospital and needs her medication. Please advise.

## 2017-03-26 RX ORDER — PRAVASTATIN SODIUM 40 MG/1
TABLET ORAL
Qty: 90 TAB | Refills: 6 | Status: SHIPPED | OUTPATIENT
Start: 2017-03-26 | End: 2018-06-13

## 2017-03-29 NOTE — TELEPHONE ENCOUNTER
Called pharmacy to verify that pt's pravastatin need a PA. Per pharmacist pt's picked up the prescription just a couple of days ago. Did not need a PA.

## 2017-04-06 ENCOUNTER — OFFICE VISIT (OUTPATIENT)
Dept: FAMILY MEDICINE CLINIC | Age: 52
End: 2017-04-06

## 2017-04-06 VITALS
BODY MASS INDEX: 28.54 KG/M2 | TEMPERATURE: 96.8 F | DIASTOLIC BLOOD PRESSURE: 72 MMHG | WEIGHT: 177.6 LBS | SYSTOLIC BLOOD PRESSURE: 148 MMHG | HEART RATE: 56 BPM | RESPIRATION RATE: 16 BRPM | HEIGHT: 66 IN | OXYGEN SATURATION: 100 %
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
## 2017-04-06 DIAGNOSIS — R59.1 LYMPHADENOPATHY OF HEAD AND NECK: ICD-10-CM

## 2017-04-06 DIAGNOSIS — R51.9 HEADACHE DISORDER: ICD-10-CM

## 2017-04-06 DIAGNOSIS — I10 ESSENTIAL HYPERTENSION: Primary | ICD-10-CM

## 2017-04-06 RX ORDER — CEPHALEXIN 500 MG/1
500 CAPSULE ORAL 4 TIMES DAILY
Qty: 40 CAP | Refills: 0 | Status: SHIPPED | OUTPATIENT
Start: 2017-04-06 | End: 2017-04-26

## 2017-04-06 RX ORDER — LOSARTAN POTASSIUM 100 MG/1
50 TABLET ORAL DAILY
COMMUNITY
End: 2018-06-13 | Stop reason: SDUPTHER

## 2017-04-06 NOTE — MR AVS SNAPSHOT
Visit Information Date & Time Provider Department Dept. Phone Encounter #  
 4/6/2017  5:15 PM Freida Escoto MD [facility] [phone] [id number] Follow-up Instructions Return in about 2 months (around 6/6/2017). Upcoming Health Maintenance Date Due  
 PAP AKA CERVICAL CYTOLOGY 11/22/2019 DTaP/Tdap/Td series (2 - Td) 3/22/2027 Allergies as of 4/6/2017  Review Complete On: 4/6/2017 By: Freida Escoto MD  
 No Known Allergies Current Immunizations  Never Reviewed No immunizations on file. Not reviewed this visit You Were Diagnosed With   
  
 Codes Comments Essential hypertension    -  Primary ICD-10-CM: I10 
ICD-9-CM: 401.9 Headache disorder     ICD-10-CM: R46 ICD-9-CM: 784.0 Insect bite, initial encounter     ICD-10-CM: W57. Lyly Mary ICD-9-CM: 919.4, E906.4 Lymphadenopathy of head and neck     ICD-10-CM: R59.1 ICD-9-CM: 418. 6 Vitals BP Pulse Temp Resp Height(growth percentile) Weight(growth percentile) 148/72 (BP 1 Location: Right arm, BP Patient Position: Sitting) (!) 56 96.8 °F (36 °C) (Oral) 16 5' 5.75\" (1.67 m) 177 lb 9.6 oz (80.6 kg) SpO2 BMI OB Status Smoking Status 100% 28.88 kg/m2 Menopause Never Smoker Vitals History BMI and BSA Data Body Mass Index Body Surface Area  
 28.88 kg/m 2 1.93 m 2 Preferred Pharmacy Pharmacy Name Phone [facility] [address] [facility] [phone] Your Updated Medication List  
  
   
This list is accurate as of: 4/6/17  6:59 PM.  Always use your most recent med list.  
  
  
  
  
 cephALEXin 500 mg capsule Commonly known as:  Azam Chirinos Take 1 Cap by mouth four (4) times daily. dicyclomine 20 mg tablet Commonly known as:  BENTYL Take 1 Tab by mouth three (3) times daily as needed. glucose blood VI test strips strip Commonly known as:  Stephen Reeve Pt to test bloos sugar once daily Lancets Misc Commonly known as:  ACCU-CHEK SOFTCLIX LANCETS Pt to test blood sugar once daily  
  
 losartan 100 mg tablet Commonly known as:  COZAAR Take 50 mg by mouth daily. pravastatin 40 mg tablet Commonly known as:  PRAVACHOL  
TAKE 1 TABLET BY MOUTH EVERY NIGHT  
  
 verapamil  mg capsule Commonly known as:  VERELAN PM  
TAKE 1 CAPSULE BY MOUTH EVERY NIGHT Prescriptions Sent to Pharmacy Refills  
 cephALEXin (KEFLEX) 500 mg capsule 0 Sig: Take 1 Cap by mouth four (4) times daily. Class: Normal  
 Pharmacy: [facility]  [address] Ph #: [phone] Route: Oral  
  
Follow-up Instructions Return in about 2 months (around 6/6/2017). Introducing [facility]! Anatoliy Millard introduces [employer] patient portal. Now you can access parts of your medical record, email your doctor's office, and request medication refills online. 1. In your internet browser, go to [email or web address] 2. Click on the First Time User? Click Here link in the Sign In box. You will see the New Member Sign Up page. 3. Enter your [employer]y Access Code exactly as it appears below. You will not need to use this code after youve completed the sign-up process. If you do not sign up before the expiration date, you must request a new code. · [employer] Access Code: [email or web address] Expires: 5/30/2017  7:28 PM 
 
4. Enter the last four digits of your Social Security Number (xxxx) and Date of Birth (mm/dd/yyyy) as indicated and click Submit. You will be taken to the next sign-up page. 5. Create a Let it Wavet ID. This will be your Morphy login ID and cannot be changed, so think of one that is secure and easy to remember. 6. Create a Morphy password. You can change your password at any time.  
7. Enter your Password Reset Question and Answer. This can be used at a later time if you forget your password. 8. Enter your e-mail address. You will receive e-mail notification when new information is available in [address] 9. Click Sign Up. You can now view and download portions of your medical record. 10. Click the Download Summary menu link to download a portable copy of your medical information. If you have questions, please visit the Frequently Asked Questions section of the [employer] website. Remember, [employer] is NOT to be used for urgent needs. For medical emergencies, dial 911. Now available from your iPhone and Android! Please provide this summary of care documentation to your next provider. Your primary care clinician is listed as Jose Chavez. If you have any questions after today's visit, please call [phone].

## 2017-04-06 NOTE — PROGRESS NOTES
Lolita Conteh is a 54 y.o. female presents in office for htn and tia f/u. Patient notes since medication change last visit she has experienced waking up in the middle of the night after taking her pravastatin. 1. Have you been to the ER, urgent care clinic since your last visit? Hospitalized since your last visit? No    2. Have you seen or consulted any other health care providers outside of the [address] [place] [address] since your last visit? Include any pap smears or colon screening.  No

## 2017-04-06 NOTE — PROGRESS NOTES
HISTORY OF PRESENT ILLNESS  Donovan Moctezuma is a 54 y.o. female here for follow-up of possible TIA hypertension questionable insect bites and lymphadenopathy on the occiput. Patient states that she has had no further TIA type symptoms and her headache has gone since decreasing the losartan to 50 mg daily. She is scheduled to see the neurologist on 21 April 2017. Patient also is complaining of multiple what appears to be insect bites on her right hand arm and occiput. [place] TIA   The history is provided by the patient and medical records. This is a recurrent problem. The problem has been resolved. There was right facial focality noted. Primary symptoms include slurred speech and speech difficulty. Pertinent negatives include no focal weakness, no loss of sensation, no loss of balance, no memory loss, no movement disorder, no agitation, no visual change, no auditory change, no mental status change, no unresponsiveness and no disorientation. There has been no fever. Associated symptoms include headaches. Pertinent negatives include no shortness of breath, no chest pain, no vomiting, no altered mental status, no confusion, no choking, no nausea, no bowel incontinence and no bladder incontinence. Associated medical issues include mood changes. Associated medical issues do not include trauma, bleeding disorder, seizures, dementia, CVA or clotting disorder. Hypertension    The history is provided by the patient and medical records. This is a chronic problem. The problem has been gradually worsening. Associated symptoms include headaches. Pertinent negatives include no chest pain, no orthopnea, no confusion, no peripheral edema, no dizziness, no shortness of breath, no nausea and no vomiting. There are no associated agents to hypertension. Risk factors include no risk factors. Mass   The history is provided by the patient (Patient is complaining of a lump on the right occiput). This is a new problem.  The problem has been gradually improving. Associated symptoms include headaches. Pertinent negatives include no chest pain and no shortness of breath. Nothing aggravates the symptoms. Nothing relieves the symptoms. She has tried nothing for the symptoms. Insect Bite   The history is provided by the patient. This is a new problem. The problem has not changed since onset. Associated symptoms include headaches. Pertinent negatives include no chest pain and no shortness of breath. Nothing aggravates the symptoms. Nothing relieves the symptoms. She has tried nothing for the symptoms. No Known Allergies  Current Outpatient Prescriptions on File Prior to Visit   Medication Sig Dispense Refill    pravastatin (PRAVACHOL) 40 mg tablet TAKE 1 TABLET BY MOUTH EVERY NIGHT 90 Tab 6    verapamil ER (VERELAN PM) 100 mg capsule TAKE 1 CAPSULE BY MOUTH EVERY NIGHT 90 Cap 1    glucose blood VI test strips (ONETOUCH VERIO) strip Pt to test bloos sugar once daily 100 Strip 1    Lancets (ACCU-CHEK SOFTCLIX LANCETS) misc Pt to test blood sugar once daily 100 Each 1    dicyclomine (BENTYL) 20 mg tablet Take 1 Tab by mouth three (3) times daily as needed. 90 Tab 2     No current facility-administered medications on file prior to visit. Past Medical History:   Diagnosis Date    Elevated blood pressure     Hypercholesterolemia     IBS (irritable bowel syndrome)      Past Surgical History:   Procedure Laterality Date    HX CHOLECYSTECTOMY      HX HYSTERECTOMY       Family History   Problem Relation Age of Onset    Hypertension Mother     Diabetes Mother     Elevated Lipids Mother     Hypertension Father    [facility] Elevated Lipids Father     Hypertension Sister     Diabetes Sister     Hypertension Brother     Hypertension Sister     Elevated Lipids Sister      Social History     Social History    Marital status:      Spouse name: N/A    Number of children: N/A    Years of education: N/A     Occupational History    Not on file. Social History Main Topics    Smoking status: Never Smoker    Smokeless tobacco: Never Used    Alcohol use No    Drug use: No    Sexual activity: Yes     Partners: Male     Other Topics Concern    Not on file     Social History Narrative     . Review of Systems   Eyes: Negative. Respiratory: Negative. Negative for choking and shortness of breath. Cardiovascular: Negative. Negative for chest pain and orthopnea. Gastrointestinal: Negative for bowel incontinence, nausea and vomiting. Genitourinary: Negative for bladder incontinence. Musculoskeletal: Negative. Skin: Positive for itching and rash. Neurological: Positive for speech change, speech difficulty and headaches. Negative for dizziness, sensory change, focal weakness, loss of consciousness and loss of balance. Psychiatric/Behavioral: Negative. Negative for agitation, confusion and memory loss. Visit Vitals    /72 (BP 1 Location: Right arm, BP Patient Position: Sitting)  Comment: manual    Pulse (!) 56    Temp 96.8 °F (36 °C) (Oral)    Resp 16    Ht 5' 5.75\" (1.67 m)    Wt 177 lb 9.6 oz (80.6 kg)    SpO2 100%    BMI 28.88 kg/m2         Physical Exam   Constitutional: She is oriented to person, place, and time. She appears well-developed and well-nourished. HENT:   Head: Normocephalic and atraumatic. Cardiovascular: Normal rate, regular rhythm, normal heart sounds and intact distal pulses. Exam reveals no gallop and no friction rub. No murmur heard. Pulmonary/Chest: Effort normal and breath sounds normal. No respiratory distress. She has no wheezes. She has no rales. Musculoskeletal: Normal range of motion. She exhibits no edema or tenderness. Neurological: She is alert and oriented to person, place, and time. She displays normal reflexes. No cranial nerve deficit. She exhibits normal muscle tone. Coordination normal.   Skin: Skin is warm.    Numerous papules are noted on neck arm and hand resembling insect bites   Psychiatric: She has a normal mood and affect. Her behavior is normal. Judgment and thought content normal.   Nursing note and vitals reviewed. ASSESSMENT and PLAN    ICD-10-CM ICD-9-CM    1. Essential hypertension I10 401.9    2. Headache disorder R51 784.0    3. Insect bite, initial encounter W57. XXXA 919.4 losartan (COZAAR) 100 mg tablet     E906.4    4. Lymphadenopathy of head and neck R59.1 785.6 cephALEXin (KEFLEX) 500 mg capsule     Follow-up Disposition:  Return in about 2 months (around 6/6/2017).

## 2017-04-19 RX ORDER — DICYCLOMINE HYDROCHLORIDE 20 MG/1
TABLET ORAL
Qty: 90 TAB | Refills: 0 | Status: SHIPPED | OUTPATIENT
Start: 2017-04-19 | End: 2017-06-09 | Stop reason: SDUPTHER

## 2017-04-26 ENCOUNTER — OFFICE VISIT (OUTPATIENT)
Dept: FAMILY MEDICINE CLINIC | Age: 52
End: 2017-04-26

## 2017-04-26 VITALS
WEIGHT: 178 LBS | OXYGEN SATURATION: 100 % | BODY MASS INDEX: 28.61 KG/M2 | DIASTOLIC BLOOD PRESSURE: 80 MMHG | TEMPERATURE: 98.1 F | HEIGHT: 66 IN | HEART RATE: 71 BPM | SYSTOLIC BLOOD PRESSURE: 140 MMHG | RESPIRATION RATE: 16 BRPM

## 2017-04-26 DIAGNOSIS — J02.9 SORE THROAT: Primary | ICD-10-CM

## 2017-04-26 DIAGNOSIS — J02.0 STREP THROAT: ICD-10-CM

## 2017-04-26 LAB
S PYO AG THROAT QL: POSITIVE
VALID INTERNAL CONTROL?: YES

## 2017-04-26 RX ORDER — PENICILLIN V POTASSIUM 500 MG/1
500 TABLET, FILM COATED ORAL 4 TIMES DAILY
Qty: 40 TAB | Refills: 0 | Status: SHIPPED | OUTPATIENT
Start: 2017-04-26 | End: 2017-06-06

## 2017-04-26 NOTE — PROGRESS NOTES
HISTORY OF PRESENT ILLNESS  Kassie Dougherty is a 54 y.o. female evaluation of sore throat. Patient states she has had sore throat for 4 days. Whether patient had a fever is unknown because she took Tylenol. Rapid strep test is positive. .  Sore Throat    The history is provided by the patient and medical records. This is a recurrent problem. The problem has been gradually worsening. There has been no fever. Associated symptoms include congestion and cough. Pertinent negatives include no diarrhea, no vomiting, no ear pain, no headaches, no trouble swallowing and no stiff neck. No Known Allergies  Current Outpatient Prescriptions on File Prior to Visit   Medication Sig Dispense Refill    dicyclomine (BENTYL) 20 mg tablet TAKE 1 TABLET BY MOUTH THREE TIMES DAILY AS NEEDED 90 Tab 0    losartan (COZAAR) 100 mg tablet Take 50 mg by mouth daily.  pravastatin (PRAVACHOL) 40 mg tablet TAKE 1 TABLET BY MOUTH EVERY NIGHT 90 Tab 6    verapamil ER (VERELAN PM) 100 mg capsule TAKE 1 CAPSULE BY MOUTH EVERY NIGHT 90 Cap 1    glucose blood VI test strips (ONETOUCH VERIO) strip Pt to test bloos sugar once daily 100 Strip 1    Lancets (ACCU-CHEK SOFTCLIX LANCETS) misc Pt to test blood sugar once daily 100 Each 1     No current facility-administered medications on file prior to visit.       Past Medical History:   Diagnosis Date    Elevated blood pressure     Hypercholesterolemia     IBS (irritable bowel syndrome)      Past Surgical History:   Procedure Laterality Date    HX CHOLECYSTECTOMY      HX HYSTERECTOMY       Family History   Problem Relation Age of Onset    Hypertension Mother     Diabetes Mother     Elevated Lipids Mother     Hypertension Father    [facility] Elevated Lipids Father     Hypertension Sister     Diabetes Sister     Hypertension Brother     Hypertension Sister     Elevated Lipids Sister      Social History     Social History    Marital status:      Spouse name: N/A    Number of children: N/A    Years of education: N/A     Occupational History    Not on file. Social History Main Topics    Smoking status: Never Smoker    Smokeless tobacco: Never Used    Alcohol use No    Drug use: No    Sexual activity: Yes     Partners: Male     Other Topics Concern    Not on file     Social History Narrative         Review of Systems   Constitutional: Negative. HENT: Positive for congestion and sore throat. Negative for ear pain and trouble swallowing. Respiratory: Positive for cough. Cardiovascular: Negative. Gastrointestinal: Negative for diarrhea and vomiting. Neurological: Negative. Negative for headaches. Endo/Heme/Allergies: Negative. Psychiatric/Behavioral: Negative. Visit Vitals    /80 (BP 1 Location: Left arm, BP Patient Position: Sitting)    Pulse 71    Temp 98.1 °F (36.7 °C) (Oral)    Resp 16    Ht 5' 5.75\" (1.67 m)    Wt 178 lb (80.7 kg)    SpO2 100%    BMI 28.95 kg/m2       Physical Exam   Constitutional: She is oriented to person, place, and time. She appears well-developed and well-nourished. HENT:   Head: Normocephalic and atraumatic. Cardiovascular: Normal rate, regular rhythm, normal heart sounds and intact distal pulses. Exam reveals no gallop and no friction rub. No murmur heard. Pulmonary/Chest: Effort normal and breath sounds normal. No respiratory distress. She has no wheezes. She has no rales. Musculoskeletal: Normal range of motion. She exhibits no edema, tenderness or deformity. Lymphadenopathy:     She has no cervical adenopathy. Neurological: She is alert and oriented to person, place, and time. No cranial nerve deficit. Coordination normal.   Skin: Skin is warm and dry. No rash noted. No erythema. No pallor. Psychiatric: She has a normal mood and affect. Her behavior is normal. Judgment and thought content normal.   Nursing note and vitals reviewed. ASSESSMENT and PLAN    ICD-10-CM ICD-9-CM    1.  Sore throat J02.9 462 AMB POC RAPID STREP A   2. Strep throat J02.0 034.0      Follow-up Disposition:  Return if symptoms worsen or fail to improve.

## 2017-04-26 NOTE — PROGRESS NOTES
Rashi Wynne is a 54 y.o. female presents to office for ST, cough and congestion     Health Maintenance items with a due date reviewed with patient:  There are no preventive care reminders to display for this patient.

## 2017-05-09 ENCOUNTER — TELEPHONE (OUTPATIENT)
Dept: FAMILY MEDICINE CLINIC | Age: 52
End: 2017-05-09

## 2017-05-09 NOTE — TELEPHONE ENCOUNTER
Patient has been called and notified to complete Monistat for 7 days for yeast infection. Notified patient that if she does not feel relief after 7 days to please notify us. Patient gave verbal understanding. Closing encounter.

## 2017-06-06 ENCOUNTER — TELEPHONE (OUTPATIENT)
Dept: SLEEP MEDICINE | Age: 52
End: 2017-06-06

## 2017-06-06 ENCOUNTER — OFFICE VISIT (OUTPATIENT)
Dept: FAMILY MEDICINE CLINIC | Age: 52
End: 2017-06-06

## 2017-06-06 VITALS
SYSTOLIC BLOOD PRESSURE: 140 MMHG | DIASTOLIC BLOOD PRESSURE: 70 MMHG | WEIGHT: 180 LBS | OXYGEN SATURATION: 98 % | HEART RATE: 57 BPM | TEMPERATURE: 98.2 F | BODY MASS INDEX: 28.93 KG/M2 | HEIGHT: 66 IN | RESPIRATION RATE: 16 BRPM

## 2017-06-06 DIAGNOSIS — I10 ESSENTIAL HYPERTENSION: Primary | ICD-10-CM

## 2017-06-06 RX ORDER — ASPIRIN 81 MG/1
162 TABLET ORAL
COMMUNITY
Start: 2017-03-20 | End: 2017-06-19 | Stop reason: SDUPTHER

## 2017-06-06 NOTE — MR AVS SNAPSHOT
Visit Information Date & Time Provider Department Dept. Phone Encounter #  
 6/6/2017  5:15 PM Krystina Vallejo MD [facility] [place] [phone] [id number] Follow-up Instructions Return in about 2 months (around 8/6/2017). Upcoming Health Maintenance Date Due INFLUENZA AGE 9 TO ADULT 8/1/2017 PAP AKA CERVICAL CYTOLOGY 11/22/2019 DTaP/Tdap/Td series (2 - Td) 3/22/2027 Allergies as of 6/6/2017  Review Complete On: 6/6/2017 By: Krystina Vallejo MD  
 No Known Allergies Current Immunizations  Never Reviewed No immunizations on file. Not reviewed this visit You Were Diagnosed With   
  
 Codes Comments Essential hypertension    -  Primary ICD-10-CM: I10 
ICD-9-CM: 401.9 Vitals BP Pulse Temp Resp Height(growth percentile) Weight(growth percentile) 140/70 (!) 57 98.2 °F (36.8 °C) (Oral) 16 5' 5.75\" (1.67 m) 180 lb (81.6 kg) SpO2 BMI OB Status Smoking Status 98% 29.28 kg/m2 Menopause Never Smoker Vitals History BMI and BSA Data Body Mass Index Body Surface Area  
 29.28 kg/m 2 1.95 m 2 Preferred Pharmacy Pharmacy Name Phone [facility] [address] [place] [address] [phone] Your Updated Medication List  
  
   
This list is accurate as of: 6/6/17  6:15 PM.  Always use your most recent med list.  
  
  
  
  
 aspirin delayed-release 81 mg tablet 162 mg.  
  
 dicyclomine 20 mg tablet Commonly known as:  BENTYL TAKE 1 TABLET BY MOUTH THREE TIMES DAILY AS NEEDED  
  
 glucose blood VI test strips strip Commonly known as:  Sanjeev Olivas Pt to test bloos sugar once daily Lancets Misc Commonly known as:  ACCU-CHEK SOFTCLIX LANCETS Pt to test blood sugar once daily  
  
 losartan 100 mg tablet Commonly known as:  COZAAR Take 50 mg by mouth daily. pravastatin 40 mg tablet Commonly known as: PRAVACHOL  
TAKE 1 TABLET BY MOUTH EVERY NIGHT  
  
 verapamil  mg capsule Commonly known as:  VERELAN PM  
TAKE 1 CAPSULE BY MOUTH EVERY NIGHT Follow-up Instructions Return in about 2 months (around 8/6/2017). To-Do List   
 06/19/2017 8:30 PM  
  [facility] [address] [facility] [address] [phone] [address] [facility]:      [facility] [phone]: [address] floor, [place], [address]      DR. AYALAS [facility] [phone]; [address] [facility], [address]  Patient instructions ·  Please do not arrive prior to your scheduled appointment time as your room may not be ready. ·  Avoid afternoon naps, caffeine and alcoholic beverages the day of your study. ·  Please bring [employer] men bottoms, women tops and bottoms. We   ask that you do not bring a one piece nightgown to sleep in. ·  Please do not apply lotion after shower the day of your appointment  ·  Please do not apply leave in hair products, such as, oils, conditioners or hairspray. ·  Remove any hairpieces, such as, extensions, weaves & sewn in wigs prior to your appointment. If you arrive with sewn in hairpieces, we will   reschedule your procedure. The [facility] are outpatient testing department. ·  We encourage you to bring a non-alcoholic/ non-caffeinated beverage and snack, if desired. The cafeteria is closed at night. ·  Please bring any medications that are routinely taken prior to bed. If you have been given a sedative for the study,  DO NOT TAKE THE SEDATIVE BEFORE ARRIVAL. Be advised that if the sedative is taken, we recommend that you not drive for 10 hours after taking it. ·  Diabetic patients should bring testing device, snack and any medications that may be needed. ·  Patients who require breathing treatments should bring the unit with them.    ·  The person having the sleep study is the only person allowed in the testing room. If another individual needs to be present throughout the night to assist in the patients care, arrangements must be made prior to the scheduled study date. ·  During the study, we encourage a time free environment. Please refrain from checking the time. ·  The technologist will ask you to turn off your cell phone. ·  Televisions are available in each room but cannot remain on during the study; it interferes with monitoring equipment. ·  During the study, the technologist will ask you to sleep on your back for a portion of the night. ·  Showers are available following your sleep study, please bring any toiletry items. We will provide washcloths and towels. Thank you for choosing the [address] If you have any questions prior to your appointment, please do not hesitate to contact us at [phone]. Introducing [facility]! Haleigh Alejandro introduces beneSol patient portal. Now you can access parts of your medical record, email your doctor's office, and request medication refills online. 1. In your internet browser, go to [email or web address] 2. Click on the First Time User? Click Here link in the Sign In box. You will see the New Member Sign Up page. 3. Enter your beneSol Access Code exactly as it appears below. You will not need to use this code after youve completed the sign-up process. If you do not sign up before the expiration date, you must request a new code. · beneSol Access Code: [email or web address] Expires: 9/4/2017  6:15 PM 
 
4. Enter the last four digits of your Social Security Number (xxxx) and Date of Birth (mm/dd/yyyy) as indicated and click Submit. You will be taken to the next sign-up page. 5. Create a JumpSoftt ID. This will be your beneSol login ID and cannot be changed, so think of one that is secure and easy to remember. 6. Create a JumpSoftt password. You can change your password at any time.  
7. Enter your Password Reset Question and Answer. This can be used at a later time if you forget your password. 8. Enter your e-mail address. You will receive e-mail notification when new information is available in [address] 9. Click Sign Up. You can now view and download portions of your medical record. 10. Click the Download Summary menu link to download a portable copy of your medical information. If you have questions, please visit the Frequently Asked Questions section of the Nurse[employer] website. Remember, NurseGrid is NOT to be used for urgent needs. For medical emergencies, dial 911. Now available from your iPhone and Android! Please provide this summary of care documentation to your next provider. Your primary care clinician is listed as Tanesha Valentino. If you have any questions after today's visit, please call [phone].

## 2017-06-06 NOTE — PROGRESS NOTES
Sal Gonzalez is a 54 y.o. female presents in office to hypertension. There are no preventive care reminders to display for this patient. 1. Have you been to the ER, urgent care clinic since your last visit? Hospitalized since your last visit?no    2. Have you seen or consulted any other health care providers outside of the [address] [place] [address] since your last visit? Include any pap smears or colon screening.  no

## 2017-06-06 NOTE — PROGRESS NOTES
HISTORY OF PRESENT ILLNESS  Rashawn Fitzgerald is a 54 y.o. female for follow-up of hypertension. Patient has not had anymore syncopal episodes. She has been seen and evaluated by neurology who is planning to do a sleep study. Of note the EEG performed does not suggest seizure activity. Aside from some personal stressors patient is feeling well. .  Hypertension    The history is provided by the patient and medical records. This is a chronic problem. The problem has been gradually improving. Pertinent negatives include no chest pain, no orthopnea, no peripheral edema, no dizziness and no shortness of breath. There are no associated agents to hypertension. Risk factors include no risk factors. No Known Allergies  Current Outpatient Prescriptions on File Prior to Visit   Medication Sig Dispense Refill    dicyclomine (BENTYL) 20 mg tablet TAKE 1 TABLET BY MOUTH THREE TIMES DAILY AS NEEDED 90 Tab 0    losartan (COZAAR) 100 mg tablet Take 50 mg by mouth daily.  pravastatin (PRAVACHOL) 40 mg tablet TAKE 1 TABLET BY MOUTH EVERY NIGHT 90 Tab 6    verapamil ER (VERELAN PM) 100 mg capsule TAKE 1 CAPSULE BY MOUTH EVERY NIGHT 90 Cap 1    glucose blood VI test strips (ONETOUCH VERIO) strip Pt to test bloos sugar once daily 100 Strip 1    Lancets (ACCU-CHEK SOFTCLIX LANCETS) misc Pt to test blood sugar once daily 100 Each 1     No current facility-administered medications on file prior to visit.       Past Medical History:   Diagnosis Date    Elevated blood pressure     Hypercholesterolemia     IBS (irritable bowel syndrome)      Past Surgical History:   Procedure Laterality Date    HX CHOLECYSTECTOMY      HX HYSTERECTOMY       Family History   Problem Relation Age of Onset    Hypertension Mother     Diabetes Mother     Elevated Lipids Mother     Hypertension Father    [facility] Elevated Lipids Father     Hypertension Sister     Diabetes Sister     Hypertension Brother     Hypertension Sister    [facility] Elevated Lipids Sister      Social History     Social History    Marital status:      Spouse name: N/A    Number of children: N/A    Years of education: N/A     Occupational History    Not on file. Social History Main Topics    Smoking status: Never Smoker    Smokeless tobacco: Never Used    Alcohol use No    Drug use: No    Sexual activity: Yes     Partners: Male     Other Topics Concern    Not on file     Social History Narrative       Review of Systems   Constitutional: Negative. Respiratory: Negative. Negative for shortness of breath. Cardiovascular: Negative. Negative for chest pain and orthopnea. Neurological: Negative. Negative for dizziness. Endo/Heme/Allergies: Negative. Psychiatric/Behavioral: Negative. Visit Vitals    /70    Pulse (!) 57    Temp 98.2 °F (36.8 °C) (Oral)    Resp 16    Ht 5' 5.75\" (1.67 m)    Wt 180 lb (81.6 kg)    SpO2 98%    BMI 29.28 kg/m2       Physical Exam   Constitutional: She is oriented to person, place, and time. She appears well-developed and well-nourished. HENT:   Head: Normocephalic and atraumatic. Cardiovascular: Normal rate, regular rhythm, normal heart sounds and intact distal pulses. Exam reveals no gallop and no friction rub. No murmur heard. Pulmonary/Chest: Effort normal and breath sounds normal. No respiratory distress. She has no wheezes. She has no rales. Musculoskeletal: Normal range of motion. She exhibits no edema, tenderness or deformity. Neurological: She is alert and oriented to person, place, and time. No cranial nerve deficit. Coordination normal.   Psychiatric: She has a normal mood and affect. Her behavior is normal. Judgment and thought content normal.   Nursing note and vitals reviewed. ASSESSMENT and PLAN    ICD-10-CM ICD-9-CM    1. Essential hypertension I10 401.9      Follow-up Disposition:  Return in about 2 months (around 8/6/2017).   current treatment plan is effective, no change in therapy

## 2017-06-06 NOTE — TELEPHONE ENCOUNTER
Patient called to verify her appointment. Patient was scheduled for 6/16/17. Patient stated that she was unable to attend that night due to her grandson's graduation. Patient was rescheduled for 6/19/17. Questions were answered and patient was given the direct number to the sleep lab.

## 2017-06-19 ENCOUNTER — HOSPITAL ENCOUNTER (OUTPATIENT)
Dept: SLEEP MEDICINE | Age: 52
Discharge: HOME | End: 2017-06-19
Attending: PSYCHIATRY & NEUROLOGY
Payer: COMMERCIAL

## 2017-06-19 DIAGNOSIS — E78.5 HYPERLIPIDEMIA: ICD-10-CM

## 2017-06-19 DIAGNOSIS — E11.9 TYPE II DIABETES MELLITUS (HCC): ICD-10-CM

## 2017-06-19 DIAGNOSIS — G47.33 OSA (OBSTRUCTIVE SLEEP APNEA): ICD-10-CM

## 2017-06-19 DIAGNOSIS — K58.9 IBS (IRRITABLE BOWEL SYNDROME): ICD-10-CM

## 2017-06-19 PROCEDURE — 95810 POLYSOM 6/> YRS 4/> PARAM: CPT

## 2017-06-19 NOTE — TELEPHONE ENCOUNTER
[facility] calling to request a refill for the patient on the pended medication. Please advise. Requested Prescriptions     Pending Prescriptions Disp Refills    aspirin delayed-release 81 mg tablet       Si Tabs.

## 2017-06-19 NOTE — TELEPHONE ENCOUNTER
Dr. Irish Joiner, please see refill request for patient, thank you! Requested Prescriptions     Pending Prescriptions Disp Refills    aspirin delayed-release 81 mg tablet       Si Tabs.

## 2017-06-20 VITALS — HEART RATE: 61 BPM | DIASTOLIC BLOOD PRESSURE: 94 MMHG | SYSTOLIC BLOOD PRESSURE: 163 MMHG

## 2017-06-21 RX ORDER — ASPIRIN 81 MG/1
162 TABLET ORAL DAILY
Qty: 90 TAB | Refills: 1 | Status: SHIPPED | OUTPATIENT
Start: 2017-06-21 | End: 2018-01-27 | Stop reason: SDUPTHER

## 2017-06-21 RX ORDER — DICYCLOMINE HYDROCHLORIDE 20 MG/1
TABLET ORAL
Qty: 90 TAB | Refills: 0 | Status: SHIPPED | OUTPATIENT
Start: 2017-06-21 | End: 2018-07-14 | Stop reason: SDUPTHER

## 2017-08-15 ENCOUNTER — OFFICE VISIT (OUTPATIENT)
Dept: FAMILY MEDICINE CLINIC | Age: 52
End: 2017-08-15

## 2017-08-15 VITALS
OXYGEN SATURATION: 98 % | WEIGHT: 181.8 LBS | BODY MASS INDEX: 29.22 KG/M2 | SYSTOLIC BLOOD PRESSURE: 140 MMHG | HEIGHT: 66 IN | RESPIRATION RATE: 18 BRPM | TEMPERATURE: 98 F | DIASTOLIC BLOOD PRESSURE: 80 MMHG | HEART RATE: 55 BPM

## 2017-08-15 DIAGNOSIS — E78.5 HYPERLIPIDEMIA, UNSPECIFIED HYPERLIPIDEMIA TYPE: ICD-10-CM

## 2017-08-15 DIAGNOSIS — R73.9 ELEVATED BLOOD SUGAR: Primary | ICD-10-CM

## 2017-08-15 DIAGNOSIS — I10 ESSENTIAL HYPERTENSION: ICD-10-CM

## 2017-08-15 DIAGNOSIS — R73.9 ELEVATED BLOOD SUGAR: ICD-10-CM

## 2017-08-15 NOTE — PROGRESS NOTES
Akanksha Hall is a 50 y.o. female presents to office for HTN. 1. Have you been to the ER, urgent care clinic or hospitalized since your last visit? no  2. Have you seen any other providers outside of [facility] since your last visit? no  3.  Have you had a Flu shot this year? no      Health Maintenance items with a due date reviewed with patient:  Health Maintenance Due   Topic Date Due    FOBT Q 1 YEAR AGE 50-75  07/17/2017    INFLUENZA AGE 9 TO ADULT  08/01/2017

## 2017-08-15 NOTE — PROGRESS NOTES
HISTORY OF PRESENT ILLNESS  Adeline Dent is a 50 y.o. female here for follow-up of hypertension and high blood glucose. Hypertension    The history is provided by the patient and medical records. This is a chronic problem. The problem has been gradually improving. Pertinent negatives include no chest pain, no orthopnea, no headaches, no peripheral edema, no dizziness and no shortness of breath. There are no associated agents to hypertension. Risk factors include dyslipidemia. Blood sugar problem   The history is provided by the patient and medical records. This is a chronic problem. The problem has not changed since onset. Pertinent negatives include no chest pain, no headaches and no shortness of breath. The symptoms are aggravated by eating. Nothing relieves the symptoms. She has tried nothing for the symptoms. Cholesterol Problem   The history is provided by the patient and medical records. This is a chronic problem. The problem has been gradually worsening. Pertinent negatives include no chest pain, no headaches and no shortness of breath. The symptoms are aggravated by eating. The symptoms are relieved by medications. Treatments tried: Pravachol. The treatment provided mild relief. No Known Allergies  Current Outpatient Prescriptions on File Prior to Visit   Medication Sig Dispense Refill    dicyclomine (BENTYL) 20 mg tablet TAKE 1 TABLET BY MOUTH THREE TIMES DAILY AS NEEDED 90 Tab 0    aspirin delayed-release 81 mg tablet Take 2 Tabs by mouth daily. 90 Tab 1    losartan (COZAAR) 100 mg tablet Take 50 mg by mouth daily.       pravastatin (PRAVACHOL) 40 mg tablet TAKE 1 TABLET BY MOUTH EVERY NIGHT 90 Tab 6    verapamil ER (VERELAN PM) 100 mg capsule TAKE 1 CAPSULE BY MOUTH EVERY NIGHT 90 Cap 1    glucose blood VI test strips (ONETOUCH VERIO) strip Pt to test bloos sugar once daily 100 Strip 1    Lancets (ACCU-CHEK SOFTCLIX LANCETS) misc Pt to test blood sugar once daily 100 Each 1     No current facility-administered medications on file prior to visit. Past Medical History:   Diagnosis Date    Elevated blood pressure     Hypercholesterolemia     IBS (irritable bowel syndrome)      Past Surgical History:   Procedure Laterality Date    HX CHOLECYSTECTOMY      HX HYSTERECTOMY       Family History   Problem Relation Age of Onset    Hypertension Mother     Diabetes Mother     Elevated Lipids Mother     Hypertension Father    [place] Elevated Lipids Father     Hypertension Sister     Diabetes Sister     Hypertension Brother     Hypertension Sister     Elevated Lipids Sister      Social History     Social History    Marital status:      Spouse name: N/A    Number of children: N/A    Years of education: N/A     Occupational History    Not on file. Social History Main Topics    Smoking status: Never Smoker    Smokeless tobacco: Never Used    Alcohol use No    Drug use: No    Sexual activity: Yes     Partners: Male     Other Topics Concern    Not on file     Social History Narrative         Review of Systems   Constitutional: Negative. Eyes: Negative. Respiratory: Negative. Negative for shortness of breath. Cardiovascular: Negative. Negative for chest pain and orthopnea. Musculoskeletal: Negative. Neurological: Negative. Negative for dizziness and headaches. Endo/Heme/Allergies: Negative. Psychiatric/Behavioral: Negative. Visit Vitals    /80 (BP 1 Location: Right arm, BP Patient Position: Sitting)    Pulse (!) 55    Temp 98 °F (36.7 °C) (Oral)    Resp 18    Ht 5' 5.75\" (1.67 m)    Wt 181 lb 12.8 oz (82.5 kg)    SpO2 98%    BMI 29.57 kg/m2         Physical Exam   Constitutional: She is oriented to person, place, and time. She appears well-developed and well-nourished. HENT:   Head: Normocephalic and atraumatic. Cardiovascular: Normal rate, regular rhythm, normal heart sounds and intact distal pulses.   Exam reveals no gallop and no friction rub. No murmur heard. Pulmonary/Chest: Effort normal and breath sounds normal. No respiratory distress. She has no wheezes. She has no rales. Musculoskeletal: Normal range of motion. She exhibits no edema, tenderness or deformity. Neurological: She is alert and oriented to person, place, and time. No cranial nerve deficit. Coordination normal.   Skin: Skin is warm and dry. No rash noted. No erythema. No pallor. Psychiatric: She has a normal mood and affect. Her behavior is normal. Judgment and thought content normal.   Nursing note and vitals reviewed. ASSESSMENT and PLAN    ICD-10-CM ICD-9-CM    1. Elevated blood sugar D04.5 882.66 METABOLIC PANEL, COMPREHENSIVE      HEMOGLOBIN A1C WITH EAG      MICROALBUMIN, UR, RAND W/ MICROALBUMIN/CREA RATIO      URINALYSIS W/ RFLX MICROSCOPIC   2. Hyperlipidemia, unspecified hyperlipidemia type F26.0 741.0 METABOLIC PANEL, COMPREHENSIVE      CBC WITH AUTOMATED DIFF      LIPID PANEL   3. Essential hypertension M28 263.8 METABOLIC PANEL, COMPREHENSIVE      URINALYSIS W/ RFLX MICROSCOPIC     Follow-up Disposition:  Return in about 4 months (around 12/15/2017).

## 2017-08-15 NOTE — MR AVS SNAPSHOT
Visit Information Date & Time Provider Department Dept. Phone Encounter #  
 8/15/2017  5:00 PM Yuni Tang MD [facility] [phone] [id number] Follow-up Instructions Return in about 4 months (around 12/15/2017). Upcoming Health Maintenance Date Due FOBT Q 1 YEAR AGE 50-75 7/17/2017 INFLUENZA AGE 9 TO ADULT 8/1/2017 BREAST CANCER SCRN MAMMOGRAM 4/28/2018 PAP AKA CERVICAL CYTOLOGY 11/22/2019 DTaP/Tdap/Td series (2 - Td) 3/22/2027 Allergies as of 8/15/2017  Review Complete On: 8/15/2017 By: Yuni Tang MD  
 No Known Allergies Current Immunizations  Never Reviewed No immunizations on file. Not reviewed this visit You Were Diagnosed With   
  
 Codes Comments Elevated blood sugar    -  Primary ICD-10-CM: R73.9 ICD-9-CM: 790.29 Hyperlipidemia, unspecified hyperlipidemia type     ICD-10-CM: E78.5 ICD-9-CM: 272.4 Essential hypertension     ICD-10-CM: I10 
ICD-9-CM: 401.9 Vitals BP Pulse Temp Resp Height(growth percentile) Weight(growth percentile) 140/80 (BP 1 Location: Right arm, BP Patient Position: Sitting) (!) 55 98 °F (36.7 °C) (Oral) 18 5' 5.75\" (1.67 m) 181 lb 12.8 oz (82.5 kg) SpO2 BMI OB Status Smoking Status 98% 29.57 kg/m2 Menopause Never Smoker BMI and BSA Data Body Mass Index Body Surface Area  
 29.57 kg/m 2 1.96 m 2 Preferred Pharmacy Pharmacy Name Phone [facility] [phone] [place] [address] [phone] Your Updated Medication List  
  
   
This list is accurate as of: 8/15/17  5:56 PM.  Always use your most recent med list.  
  
  
  
  
 aspirin delayed-release 81 mg tablet Take 2 Tabs by mouth daily. dicyclomine 20 mg tablet Commonly known as:  BENTYL TAKE 1 TABLET BY MOUTH THREE TIMES DAILY AS NEEDED  
  
 glucose blood VI test strips strip Commonly known as:  [place] Pt to test bloos sugar once daily Lancets Misc Commonly known as:  ACCU-CHEK SOFTCLIX LANCETS Pt to test blood sugar once daily  
  
 losartan 100 mg tablet Commonly known as:  COZAAR Take 50 mg by mouth daily. pravastatin 40 mg tablet Commonly known as:  PRAVACHOL  
TAKE 1 TABLET BY MOUTH EVERY NIGHT  
  
 verapamil  mg capsule Commonly known as:  VERELAN PM  
TAKE 1 CAPSULE BY MOUTH EVERY NIGHT Follow-up Instructions Return in about 4 months (around 12/15/2017). To-Do List   
 08/15/2017 Lab:  CBC WITH AUTOMATED DIFF   
  
 08/15/2017 Lab:  HEMOGLOBIN A1C WITH EAG   
  
 08/15/2017 Lab:  LIPID PANEL   
  
 08/15/2017 Lab:  METABOLIC PANEL, COMPREHENSIVE   
  
 08/15/2017 Lab:  MICROALBUMIN, UR, RAND W/ MICROALBUMIN/CREA RATIO   
  
 08/15/2017 Lab:  URINALYSIS W/ RFLX MICROSCOPIC Patient Instructions Xyzal for nasal congestion along with either Flonase Nasacort or Nasonex Introducing [facility]! Dana Hooker introduces [employer] patient portal. Now you can access parts of your medical record, email your doctor's office, and request medication refills online. 1. In your internet browser, go to https://"[employer]". [employer]/"[employer]" 2. Click on the First Time User? Click Here link in the Sign In box. You will see the New Member Sign Up page. 3. Enter your [employer] Access Code exactly as it appears below. You will not need to use this code after youve completed the sign-up process. If you do not sign up before the expiration date, you must request a new code. · [employer] Access Code: [email or web address] Expires: 9/4/2017  6:15 PM 
 
4. Enter the last four digits of your Social Security Number (xxxx) and Date of Birth (mm/dd/yyyy) as indicated and click Submit. You will be taken to the next sign-up page. 5. Create a [employer] ID.  This will be your [employer] login ID and cannot be changed, so think of one that is secure and easy to remember. 6. Create a [employer] password. You can change your password at any time. 7. Enter your Password Reset Question and Answer. This can be used at a later time if you forget your password. 8. Enter your e-mail address. You will receive e-mail notification when new information is available in [address] 9. Click Sign Up. You can now view and download portions of your medical record. 10. Click the Download Summary menu link to download a portable copy of your medical information. If you have questions, please visit the Frequently Asked Questions section of the [employer] website. Remember, [employer] is NOT to be used for urgent needs. For medical emergencies, dial 911. Now available from your iPhone and Android! Please provide this summary of care documentation to your next provider. Your primary care clinician is listed as Chrissie Lesch. If you have any questions after today's visit, please call [phone].

## 2017-11-13 DIAGNOSIS — I10 ESSENTIAL HYPERTENSION: ICD-10-CM

## 2017-11-13 DIAGNOSIS — G44.59 OTHER COMPLICATED HEADACHE SYNDROME: ICD-10-CM

## 2017-11-13 DIAGNOSIS — G45.9 TRANSIENT CEREBRAL ISCHEMIA, UNSPECIFIED TYPE: ICD-10-CM

## 2017-11-13 RX ORDER — VERAPAMIL HYDROCHLORIDE 100 MG/1
CAPSULE, EXTENDED RELEASE ORAL
Qty: 30 CAP | Refills: 0 | Status: SHIPPED | OUTPATIENT
Start: 2017-11-13 | End: 2017-11-24 | Stop reason: SDUPTHER

## 2017-11-24 ENCOUNTER — OFFICE VISIT (OUTPATIENT)
Dept: FAMILY MEDICINE CLINIC | Age: 52
End: 2017-11-24

## 2017-11-24 VITALS
BODY MASS INDEX: 30.66 KG/M2 | HEART RATE: 64 BPM | HEIGHT: 65 IN | DIASTOLIC BLOOD PRESSURE: 95 MMHG | WEIGHT: 184 LBS | SYSTOLIC BLOOD PRESSURE: 141 MMHG | OXYGEN SATURATION: 98 % | RESPIRATION RATE: 17 BRPM | TEMPERATURE: 96.9 F

## 2017-11-24 DIAGNOSIS — K13.0 LIP LESION: Primary | ICD-10-CM

## 2017-11-24 NOTE — PROGRESS NOTES
Joi Bar is a 50 y.o. female presents to office for bump on bottom of lip.       1. Have you been to the ER, urgent care clinic or hospitalized since your last visit? no          Health Maintenance items with a due date reviewed with patient:  Health Maintenance Due   Topic Date Due    FOBT Q 1 YEAR AGE 50-75  07/17/2017    Influenza Age 5 to Adult  08/01/2017

## 2017-11-25 LAB
A-G RATIO,AGRAT: 1.4 RATIO (ref 1.1–2.6)
ABSOLUTE LYMPHOCYTE COUNT, 10803: 2.7 K/UL (ref 1–4.8)
ALBUMIN SERPL-MCNC: 4 G/DL (ref 3.5–5)
ALP SERPL-CCNC: 63 U/L (ref 25–115)
ALT SERPL-CCNC: 11 U/L (ref 5–40)
ANION GAP SERPL CALC-SCNC: 15 MMOL/L
AST SERPL W P-5'-P-CCNC: 21 U/L (ref 10–37)
AVG GLU, 10930: 121 MG/DL (ref 91–123)
BASOPHILS # BLD: 0 K/UL (ref 0–0.2)
BASOPHILS NFR BLD: 0 % (ref 0–2)
BILIRUB SERPL-MCNC: 0.2 MG/DL (ref 0.2–1.2)
BILIRUB UR QL: NEGATIVE
BUN SERPL-MCNC: 23 MG/DL (ref 6–22)
CALCIUM SERPL-MCNC: 8.9 MG/DL (ref 8.4–10.5)
CHLORIDE SERPL-SCNC: 102 MMOL/L (ref 98–110)
CHOLEST SERPL-MCNC: 195 MG/DL (ref 110–200)
CO2 SERPL-SCNC: 23 MMOL/L (ref 20–32)
CREAT SERPL-MCNC: 0.9 MG/DL (ref 0.5–1.2)
CREATININE, URINE: 200 MG/DL
EOSINOPHIL # BLD: 0 K/UL (ref 0–0.5)
EOSINOPHIL NFR BLD: 1 % (ref 0–6)
ERYTHROCYTE [DISTWIDTH] IN BLOOD BY AUTOMATED COUNT: 15 % (ref 10–16)
GFRAA, 66117: >60
GFRNA, 66118: >60
GLOBULIN,GLOB: 2.8 G/DL (ref 2–4)
GLUCOSE SERPL-MCNC: 87 MG/DL (ref 70–99)
GLUCOSE UR QL: NEGATIVE MG/DL
GRANULOCYTES,GRANS: 41 % (ref 40–75)
HBA1C MFR BLD HPLC: 5.8 % (ref 4.8–5.9)
HCT VFR BLD AUTO: 33.8 % (ref 35.1–48)
HDLC SERPL-MCNC: 73 MG/DL (ref 40–59)
HGB BLD-MCNC: 10.8 G/DL (ref 11.7–16)
HGB UR QL STRIP: NEGATIVE
KETONES UR QL STRIP.AUTO: NEGATIVE MG/DL
LDLC SERPL CALC-MCNC: 110 MG/DL (ref 50–99)
LEUKOCYTE ESTERASE: NEGATIVE
LYMPHOCYTES, LYMLT: 53 % (ref 27–45)
MCH RBC QN AUTO: 27 PG (ref 26–34)
MCHC RBC AUTO-ENTMCNC: 32 G/DL (ref 32–36)
MCV RBC AUTO: 85 FL (ref 80–95)
MICROALB/CREAT RATIO, 140286: NORMAL MCG/MG OF CREATININE (ref 0–30)
MICROALBUMIN,URINE RANDOM 140054: <12 UG/ML (ref 0.1–17)
MONOCYTES # BLD: 0.3 K/UL (ref 0.1–0.9)
MONOCYTES NFR BLD: 5 % (ref 3–9)
NEUTROPHILS # BLD AUTO: 2.1 K/UL (ref 1.8–7.7)
NITRITE UR QL STRIP.AUTO: NEGATIVE
PH UR STRIP: 5 PH (ref 5–8)
PLATELET # BLD AUTO: 186 K/UL (ref 140–440)
PMV BLD AUTO: 11.4 FL (ref 6–10.8)
POTASSIUM SERPL-SCNC: 3.8 MMOL/L (ref 3.5–5.5)
PROT SERPL-MCNC: 6.8 G/DL (ref 6.4–8.3)
PROT UR QL STRIP: NEGATIVE MG/DL
RBC # BLD AUTO: 3.99 M/UL (ref 3.8–5.2)
SODIUM SERPL-SCNC: 140 MMOL/L (ref 133–145)
SP GR UR: 1.03 (ref 1–1.03)
TRIGL SERPL-MCNC: 57 MG/DL (ref 40–149)
UROBILINOGEN UR STRIP-MCNC: <2 MG/DL
VLDLC SERPL CALC-MCNC: 11 MG/DL (ref 8–30)
WBC # BLD AUTO: 5 K/UL (ref 4–11)

## 2017-11-25 NOTE — PROGRESS NOTES
Sandor Perry is a 50 y.o.  female and presents with a few days hx of lowe lip lesion that is noticeable but not painful. She is bothered by it because it is visible and had not had it in the past. Non smoker. No open lesion or blister. Chief Complaint   Patient presents with    Other     lump on lip     Subjective: Additional Concerns: none     Patient Active Problem List    Diagnosis Date Noted    Elevated blood sugar 11/07/2016    Elevated blood pressure 11/03/2015    Family history of diabetes mellitus in mother 11/03/2015    Screening for cholesterol level 11/03/2015     Current Outpatient Prescriptions   Medication Sig Dispense Refill    dicyclomine (BENTYL) 20 mg tablet TAKE 1 TABLET BY MOUTH THREE TIMES DAILY AS NEEDED 90 Tab 0    aspirin delayed-release 81 mg tablet Take 2 Tabs by mouth daily. 90 Tab 1    losartan (COZAAR) 100 mg tablet Take 50 mg by mouth daily.       pravastatin (PRAVACHOL) 40 mg tablet TAKE 1 TABLET BY MOUTH EVERY NIGHT 90 Tab 6    verapamil ER (VERELAN PM) 100 mg capsule TAKE 1 CAPSULE BY MOUTH EVERY NIGHT 90 Cap 1    glucose blood VI test strips (ONETOUCH VERIO) strip Pt to test bloos sugar once daily 100 Strip 1    Lancets (ACCU-CHEK SOFTCLIX LANCETS) misc Pt to test blood sugar once daily 100 Each 1     No Known Allergies  Past Medical History:   Diagnosis Date    Elevated blood pressure     Hypercholesterolemia     IBS (irritable bowel syndrome)      Past Surgical History:   Procedure Laterality Date    HX CHOLECYSTECTOMY      HX HYSTERECTOMY       Family History   Problem Relation Age of Onset    Hypertension Mother    [facility] Diabetes Mother     Elevated Lipids Mother     Hypertension Father    [facility] Elevated Lipids Father     Hypertension Sister     Diabetes Sister     Hypertension Brother     Hypertension Sister     Elevated Lipids Sister      Social History   Substance Use Topics    Smoking status: Never Smoker    Smokeless tobacco: Never Used    Alcohol use No     ROS     General: negative for - chills, fatigue, fever, weight change  Derm: negative for - dry skin, hair changes, rash, positve for lower left lip lesion changes    Objective:  Vitals:    11/24/17 1001   BP: (!) 141/95   Pulse: 64   Resp: 17   Temp: 96.9 °F (36.1 °C)   TempSrc: Oral   SpO2: 98%   Weight: 184 lb (83.5 kg)   Height: 5' 5\" (1.651 m)   PainSc:   0 - No pain     PE    Alert, well appearing, and in no distress, oriented to person, place, and time and overweight  General appearance - alert, well appearing, and in no distress and oriented to person, place, and time  Mental status - alert, oriented to person, place, and time, normal mood, behavior, speech, dress, motor activity, and thought processes  Chest - clear to auscultation, no wheezes, rales or rhonchi, symmetric air entry  Heart - normal rate, regular rhythm, normal S1, S2, no murmurs, rubs, clicks or gallops  Extremities - peripheral pulses normal, no pedal edema, no clubbing or cyanosis  Lip lower - a small less than 2mm round no-painful lesion left lower lip. No open areas  LABS   Orders Only on 11/24/2017   Component Date Value Ref Range Status    Triglyceride 11/24/2017 57  40 - 149 mg/dL Final    HDL Cholesterol 11/24/2017 73* 40 - 59 mg/dL Final    Cholesterol, total 11/24/2017 195  110 - 200 mg/dL Final    LDL, calculated 11/24/2017 110* 50 - 99 mg/dL Final    VLDL, calculated 11/24/2017 11  8 - 30 mg/dL Final    Comment: Test includes cholesterol, HDL cholesterol, triglycerides and LDL.   Cholesterol Recommended NCEP guidelines in mg/dL:  Less than 200      Desirable  200 - 239          Borderline High  Greater than or  = to 240   High      Glucose 11/24/2017 87  70 - 99 mg/dL Final    BUN 11/24/2017 23* 6 - 22 mg/dL Final    Creatinine 11/24/2017 0.9  0.5 - 1.2 mg/dL Final    Sodium 11/24/2017 140  133 - 145 mmol/L Final    Potassium 11/24/2017 3.8  3.5 - 5.5 mmol/L Final    Chloride 11/24/2017 102  98 - 110 mmol/L Final    CO2 11/24/2017 23  20 - 32 mmol/L Final    AST (SGOT) 11/24/2017 21  10 - 37 U/L Final    ALT (SGPT) 11/24/2017 11  5 - 40 U/L Final    Alk. phosphatase 11/24/2017 63  25 - 115 U/L Final    Bilirubin, total 11/24/2017 0.2  0.2 - 1.2 mg/dL Final    Calcium 11/24/2017 8.9  8.4 - 10.5 mg/dL Final    Protein, total 11/24/2017 6.8  6.4 - 8.3 g/dL Final    Albumin 11/24/2017 4.0  3.5 - 5.0 g/dL Final    A-G Ratio 11/24/2017 1.4  1.1 - 2.6 ratio Final    Globulin 11/24/2017 2.8  2.0 - 4.0 g/dL Final    Anion gap 11/24/2017 15.0  mmol/L Final    Comment: Test includes Albumin, Alkaline Phosphatase, ALT, AST, BUN, Calcium, CO2,  Chloride, Creatinine, Glucose, Potassium, Sodium, Total Bilirubin and Total  Protein. Estimated GFR results are reported in mL/min/1.73 sq.m. by the MDRD equation. This eGFR is validated for stable chronic renal failure patients. This   equation  is unreliable in acute illness or patients with normal renal function.  GFRAA 11/24/2017 >60.0  >60.0 Final    GFRNA 11/24/2017 >60.0  >60.0 Final    WBC 11/24/2017 5.0  4.0 - 11.0 K/uL Final    RBC 11/24/2017 3.99  3.80 - 5.20 M/uL Final    HGB 11/24/2017 10.8* 11.7 - 16.0 g/dL Final    HCT 11/24/2017 33.8* 35.1 - 48.0 % Final    MCV 11/24/2017 85  80 - 95 fL Final    MCH 11/24/2017 27  26 - 34 pg Final    MCHC 11/24/2017 32  32 - 36 g/dL Final    RDW 11/24/2017 15.0  10.0 - 16.0 % Final    PLATELET 51/69/0434 933  140 - 440 K/uL Final    MPV 11/24/2017 11.4* 6.0 - 10.8 fL Final    NEUTROPHILS 11/24/2017 41  40 - 75 % Final    Lymphocytes 11/24/2017 53* 27 - 45 % Final    MONOCYTES 11/24/2017 5  3 - 9 % Final    EOSINOPHILS 11/24/2017 1  0 - 6 % Final    BASOPHILS 11/24/2017 0  0 - 2 % Final    ABS. NEUTROPHILS 11/24/2017 2.1  1.8 - 7.7 K/uL Final    ABSOLUTE LYMPHOCYTE COUNT 11/24/2017 2.7  1.0 - 4.8 K/uL Final    ABS. MONOCYTES 11/24/2017 0.3  0.1 - 0.9 K/uL Final    ABS.  EOSINOPHILS 11/24/2017 0.0  0.0 - 0.5 K/uL Final    ABS. BASOPHILS 11/24/2017 0.0  0.0 - 0.2 K/uL Final    Hemoglobin A1c 11/24/2017 5.8  4.8 - 5.9 % Final    AVG GLU 11/24/2017 121  91 - 123 mg/dL Final    Specific Gravity 11/24/2017 1.027  1.005 - 1.03 Final    pH (UA) 11/24/2017 5.0  5.0 - 8.0 pH Final    Protein 11/24/2017 Negative  Negative, mg/dL Final    Glucose 11/24/2017 Negative  Negative mg/dL Final    Ketone 11/24/2017 Negative  Negative mg/dL Final    Bilirubin 11/24/2017 Negative  Negative Final    Blood 11/24/2017 Negative  Negative Final    Nitrites 11/24/2017 Negative  Negative Final    Leukocyte Esterase 11/24/2017 Negative  Negative Final    Urobilinogen 11/24/2017 <2.0  <2.0 mg/dL Final    Creatinine, urine 11/24/2017 200  mg/dL Final    Microalbumin, urine 11/24/2017 <12.0  0.1 - 17.0 ug/mL Final    Microalb/Creat ratio (ug/mg creat.) 11/24/2017   0.0 - 30.0 mcg/mg of Creatinine Final    ** Unable to calculate Microablumin/Creatinine Ratio due to low Microalbumin       TESTS  Results for orders placed or performed in visit on 11/24/17   LIPID PANEL   Result Value Ref Range    Triglyceride 57 40 - 149 mg/dL    HDL Cholesterol 73 (H) 40 - 59 mg/dL    Cholesterol, total 195 110 - 200 mg/dL    LDL, calculated 110 (H) 50 - 99 mg/dL    VLDL, calculated 11 8 - 30 mg/dL   METABOLIC PANEL, COMPREHENSIVE   Result Value Ref Range    Glucose 87 70 - 99 mg/dL    BUN 23 (H) 6 - 22 mg/dL    Creatinine 0.9 0.5 - 1.2 mg/dL    Sodium 140 133 - 145 mmol/L    Potassium 3.8 3.5 - 5.5 mmol/L    Chloride 102 98 - 110 mmol/L    CO2 23 20 - 32 mmol/L    AST (SGOT) 21 10 - 37 U/L    ALT (SGPT) 11 5 - 40 U/L    Alk.  phosphatase 63 25 - 115 U/L    Bilirubin, total 0.2 0.2 - 1.2 mg/dL    Calcium 8.9 8.4 - 10.5 mg/dL    Protein, total 6.8 6.4 - 8.3 g/dL    Albumin 4.0 3.5 - 5.0 g/dL    A-G Ratio 1.4 1.1 - 2.6 ratio    Globulin 2.8 2.0 - 4.0 g/dL    Anion gap 15.0 mmol/L    GFRAA >60.0 >60.0    GFRNA >60.0 >60.0   CBC WITH AUTOMATED DIFF   Result Value Ref Range    WBC 5.0 4.0 - 11.0 K/uL    RBC 3.99 3.80 - 5.20 M/uL    HGB 10.8 (L) 11.7 - 16.0 g/dL    HCT 33.8 (L) 35.1 - 48.0 %    MCV 85 80 - 95 fL    MCH 27 26 - 34 pg    MCHC 32 32 - 36 g/dL    RDW 15.0 10.0 - 16.0 %    PLATELET 639 948 - 281 K/uL    MPV 11.4 (H) 6.0 - 10.8 fL    NEUTROPHILS 41 40 - 75 %    Lymphocytes 53 (H) 27 - 45 %    MONOCYTES 5 3 - 9 %    EOSINOPHILS 1 0 - 6 %    BASOPHILS 0 0 - 2 %    ABS. NEUTROPHILS 2.1 1.8 - 7.7 K/uL    ABSOLUTE LYMPHOCYTE COUNT 2.7 1.0 - 4.8 K/uL    ABS. MONOCYTES 0.3 0.1 - 0.9 K/uL    ABS. EOSINOPHILS 0.0 0.0 - 0.5 K/uL    ABS. BASOPHILS 0.0 0.0 - 0.2 K/uL   HEMOGLOBIN A1C W/O EAG   Result Value Ref Range    Hemoglobin A1c 5.8 4.8 - 5.9 %    AVG  91 - 123 mg/dL   URINALYSIS W/ RFLX MICROSCOPIC   Result Value Ref Range    Specific Gravity 1.027 1.005 - 1.03    pH (UA) 5.0 5.0 - 8.0 pH    Protein Negative Negative, mg/dL    Glucose Negative Negative mg/dL    Ketone Negative Negative mg/dL    Bilirubin Negative Negative    Blood Negative Negative    Nitrites Negative Negative    Leukocyte Esterase Negative Negative    Urobilinogen <2.0 <2.0 mg/dL   MICROALBUMIN, UR, RAND   Result Value Ref Range    Creatinine, urine 200 mg/dL    Microalbumin, urine <12.0 0.1 - 17.0 ug/mL    Microalb/Creat ratio (ug/mg creat.)  0.0 - 30.0 mcg/mg of Creatinine     Assessment/Plan: 1. Lip lesion, acute on lower lip   - REFERRAL TO DERMATOLOGY    2. HTN - informed patient about new BP guidelines. Patient to check BP daily at home and to F/U with PCP if persistently greater than 130/80. Lab review: no lab studies available for review at time of visit. I have discussed the diagnosis with the patient and the intended plan as seen in the above orders. The patient has received an after-visit summary and questions were answered concerning future plans. I have discussed medication side effects and warnings with the patient as well. I have reviewed the plan of care with the patient, accepted their input and they are in agreement with the treatment goals. F/U as needed.     Conrado Jackson MD

## 2017-12-05 ENCOUNTER — OFFICE VISIT (OUTPATIENT)
Dept: FAMILY MEDICINE CLINIC | Age: 52
End: 2017-12-05

## 2017-12-05 VITALS
WEIGHT: 184 LBS | SYSTOLIC BLOOD PRESSURE: 120 MMHG | HEIGHT: 65 IN | BODY MASS INDEX: 30.66 KG/M2 | HEART RATE: 55 BPM | RESPIRATION RATE: 16 BRPM | DIASTOLIC BLOOD PRESSURE: 74 MMHG | OXYGEN SATURATION: 100 % | TEMPERATURE: 96.7 F
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
## 2017-12-05 DIAGNOSIS — E78.5 HYPERLIPIDEMIA, UNSPECIFIED HYPERLIPIDEMIA TYPE: ICD-10-CM

## 2017-12-05 DIAGNOSIS — R73.9 ELEVATED BLOOD SUGAR: Primary | ICD-10-CM

## 2017-12-05 DIAGNOSIS — R03.0 ELEVATED BLOOD PRESSURE READING: ICD-10-CM

## 2017-12-05 NOTE — PROGRESS NOTES
HISTORY OF PRESENT ILLNESS  Quan Sequeira is a 50 y.o. female here for follow-up on hypertension and high hyperglycemia as well as hyperlipidemia. Patient apparently stayed at a hotel in [facility] [place] and was bitten by bed bugs. She now has multiple lesions. On face shoulder and back. Patient states both fasting and postprandial blood sugars have been around 100. Hypertension    The history is provided by the patient and medical records. This is a chronic problem. The problem has been gradually improving. Pertinent negatives include no orthopnea, no peripheral edema and no dizziness. There are no associated agents to hypertension. Risk factors include dyslipidemia. Cholesterol Problem   The history is provided by the patient and medical records. This is a chronic problem. The problem has been gradually worsening. The symptoms are aggravated by eating. The symptoms are relieved by medications. Treatments tried: Pravachol. The treatment provided mild relief. Insect Bite   The history is provided by the patient. This is a new problem. The problem has not changed since onset. Nothing aggravates the symptoms. Nothing relieves the symptoms. She has tried nothing for the symptoms. Blood sugar problem   The history is provided by the patient and medical records. This is a chronic problem. The problem has been gradually improving. The symptoms are aggravated by eating. Nothing relieves the symptoms. She has tried nothing for the symptoms. No Known Allergies  Current Outpatient Prescriptions on File Prior to Visit   Medication Sig Dispense Refill    dicyclomine (BENTYL) 20 mg tablet TAKE 1 TABLET BY MOUTH THREE TIMES DAILY AS NEEDED 90 Tab 0    aspirin delayed-release 81 mg tablet Take 2 Tabs by mouth daily. 90 Tab 1    losartan (COZAAR) 100 mg tablet Take 50 mg by mouth daily.       pravastatin (PRAVACHOL) 40 mg tablet TAKE 1 TABLET BY MOUTH EVERY NIGHT 90 Tab 6    verapamil ER (VERELAN PM) 100 mg capsule TAKE 1 CAPSULE BY MOUTH EVERY NIGHT 90 Cap 1    glucose blood VI test strips (ONETOUCH VERIO) strip Pt to test bloos sugar once daily 100 Strip 1    Lancets (ACCU-CHEK SOFTCLIX LANCETS) misc Pt to test blood sugar once daily 100 Each 1     No current facility-administered medications on file prior to visit. Past Medical History:   Diagnosis Date    Elevated blood pressure     Hypercholesterolemia     IBS (irritable bowel syndrome)      Past Surgical History:   Procedure Laterality Date    HX CHOLECYSTECTOMY      HX HYSTERECTOMY       Family History   Problem Relation Age of Onset    Hypertension Mother     Diabetes Mother     Elevated Lipids Mother     Hypertension Father    [facility] Elevated Lipids Father     Hypertension Sister     Diabetes Sister     Hypertension Brother     Hypertension Sister     Elevated Lipids Sister      . Social History     Social History    Marital status:      Spouse name: N/A    Number of children: N/A    Years of education: N/A     Occupational History    Not on file. Social History Main Topics    Smoking status: Never Smoker    Smokeless tobacco: Never Used    Alcohol use No    Drug use: No    Sexual activity: Yes     Partners: Male     Other Topics Concern    Not on file     Social History Narrative       Review of Systems   Constitutional: Negative. Eyes: Negative. Respiratory: Negative. Cardiovascular: Negative. Negative for orthopnea. Musculoskeletal: Negative. Neurological: Negative. Negative for dizziness. Endo/Heme/Allergies: Negative. Psychiatric/Behavioral: Negative. Visit Vitals    /74 (BP 1 Location: Left arm, BP Patient Position: Sitting)    Pulse (!) 55    Temp 96.7 °F (35.9 °C) (Oral)    Resp 16    Ht 5' 5\" (1.651 m)    Wt 184 lb (83.5 kg)    SpO2 100%    BMI 30.62 kg/m2       Physical Exam   Constitutional: She is oriented to person, place, and time.  She appears well-developed and well-nourished. HENT:   Head: Normocephalic and atraumatic. Cardiovascular: Normal rate, regular rhythm, normal heart sounds and intact distal pulses. Exam reveals no gallop and no friction rub. No murmur heard. Pulmonary/Chest: Effort normal and breath sounds normal. No respiratory distress. She has no wheezes. She has no rales. Musculoskeletal: Normal range of motion. She exhibits no edema, tenderness or deformity. Neurological: She is alert and oriented to person, place, and time. No cranial nerve deficit. Coordination normal.   Skin: Skin is warm and dry. No rash noted. No erythema. No pallor. Psychiatric: She has a normal mood and affect. Her behavior is normal. Judgment and thought content normal.   Nursing note and vitals reviewed. ASSESSMENT and PLAN    ICD-10-CM ICD-9-CM    1. Elevated blood sugar R73.9 790.29    2. Elevated blood pressure reading R03.0 796.2    3. Insect bite, initial encounter W57. XXXA 919.4      E906.4    4. Hyperlipidemia, unspecified hyperlipidemia type E78.5 272.4      Follow-up Disposition:  Return in about 6 months (around 6/5/2018).

## 2017-12-05 NOTE — PROGRESS NOTES
Mayra Morrison is a 50 y.o. female presents to office for follow up HTN, High blood sugar and cholesterol       1. Have you been to the ER, urgent care clinic or hospitalized since your last visit?  No         Health Maintenance items with a due date reviewed with patient:  Health Maintenance Due   Topic Date Due    FOBT Q 1 YEAR AGE 50-75  07/17/2017    Influenza Age 5 to Adult  08/01/2017

## 2017-12-12 RX ORDER — PRAVASTATIN SODIUM 80 MG/1
80 TABLET ORAL
Qty: 30 TAB | Refills: 6 | Status: SHIPPED | OUTPATIENT
Start: 2017-12-12 | End: 2017-12-12 | Stop reason: SDUPTHER

## 2017-12-21 ENCOUNTER — TELEPHONE (OUTPATIENT)
Dept: FAMILY MEDICINE CLINIC | Age: 52
End: 2017-12-21

## 2017-12-21 NOTE — TELEPHONE ENCOUNTER
Pt is calling for clarification on Pravachol. On 12/12/17 he was prescribed 80 mg but before that she was getting 40 mg. Please review and follow up with pt and also send a new RX if needed. Please advise.

## 2017-12-26 NOTE — TELEPHONE ENCOUNTER
Patient has been called and notified that she was increased due to uncontrolled levels. Patient gave verbal understanding. Patient scheduled for 6 month follow up. Closing encounter.

## 2018-01-12 DIAGNOSIS — R73.9 ELEVATED BLOOD SUGAR: ICD-10-CM

## 2018-01-16 RX ORDER — BLOOD SUGAR DIAGNOSTIC
STRIP MISCELLANEOUS
Qty: 100 STRIP | Refills: 0 | Status: SHIPPED | OUTPATIENT
Start: 2018-01-16 | End: 2018-10-25

## 2018-01-18 DIAGNOSIS — R73.9 ELEVATED BLOOD SUGAR: ICD-10-CM

## 2018-01-18 RX ORDER — LANCETS 33 GAUGE
EACH MISCELLANEOUS
Qty: 100 LANCET | Refills: 1 | Status: SHIPPED | OUTPATIENT
Start: 2018-01-18 | End: 2018-09-08

## 2018-01-30 RX ORDER — ASPIRIN 81 MG/1
TABLET ORAL
Qty: 90 TAB | Refills: 0 | Status: SHIPPED | OUTPATIENT
Start: 2018-01-30 | End: 2018-03-25 | Stop reason: SDUPTHER

## 2018-02-01 ENCOUNTER — TELEPHONE (OUTPATIENT)
Dept: FAMILY MEDICINE CLINIC | Age: 53
End: 2018-02-01

## 2018-02-01 RX ORDER — LANCETS
EACH MISCELLANEOUS
Qty: 1 EACH | Refills: 11 | Status: CANCELLED | OUTPATIENT
Start: 2018-02-01

## 2018-02-01 RX ORDER — BLOOD-GLUCOSE METER
EACH MISCELLANEOUS
Qty: 50 EACH | Refills: 11 | Status: CANCELLED | OUTPATIENT
Start: 2018-02-01

## 2018-02-01 NOTE — TELEPHONE ENCOUNTER
Received prior auth from [employer] citing that the testing supplies required prior auth. Changing to accu[employer]k in hopes of better coverage. Requested Prescriptions     Pending Prescriptions Disp Refills    Blood-Glucose Meter (ACCU-CHEK KAREN PLUS METER) misc 50 Each 11     Sig: Use to test blood sugar once daily.  glucose blood VI test strips (ACCU-CHEK KAREN PLUS TEST STRP) strip 50 Strip 11     Sig: Use to test blood sugar once daily.  Lancets (ACCU-CHEK FASTCLIX) misc 1 Each 11     Sig: Use to test blood sugar once daily.

## 2018-03-12 RX ORDER — PRAVASTATIN SODIUM 80 MG/1
TABLET ORAL
Qty: 90 TAB | Refills: 0 | Status: SHIPPED | OUTPATIENT
Start: 2018-03-12 | End: 2018-06-13 | Stop reason: SDUPTHER

## 2018-03-26 RX ORDER — ASPIRIN 81 MG/1
TABLET ORAL
Qty: 90 TAB | Refills: 0 | Status: SHIPPED | OUTPATIENT
Start: 2018-03-26 | End: 2019-06-16 | Stop reason: SDUPTHER

## 2018-04-24 DIAGNOSIS — I10 ESSENTIAL HYPERTENSION: ICD-10-CM

## 2018-04-25 RX ORDER — LOSARTAN POTASSIUM 100 MG/1
TABLET ORAL
Qty: 30 TAB | Refills: 0 | Status: SHIPPED | OUTPATIENT
Start: 2018-04-25 | End: 2018-05-22 | Stop reason: SDUPTHER

## 2018-05-22 DIAGNOSIS — I10 ESSENTIAL HYPERTENSION: ICD-10-CM

## 2018-05-23 RX ORDER — LOSARTAN POTASSIUM 100 MG/1
TABLET ORAL
Qty: 30 TAB | Refills: 0 | Status: SHIPPED | OUTPATIENT
Start: 2018-05-23 | End: 2018-06-13 | Stop reason: SDUPTHER

## 2018-06-13 ENCOUNTER — OFFICE VISIT (OUTPATIENT)
Dept: FAMILY MEDICINE CLINIC | Age: 53
End: 2018-06-13

## 2018-06-13 VITALS
OXYGEN SATURATION: 100 % | WEIGHT: 187.2 LBS | HEART RATE: 53 BPM | HEIGHT: 65 IN | BODY MASS INDEX: 31.19 KG/M2 | TEMPERATURE: 96.9 F | SYSTOLIC BLOOD PRESSURE: 157 MMHG | RESPIRATION RATE: 20 BRPM | DIASTOLIC BLOOD PRESSURE: 88 MMHG

## 2018-06-13 DIAGNOSIS — I10 ESSENTIAL HYPERTENSION: ICD-10-CM

## 2018-06-13 DIAGNOSIS — E78.5 HYPERLIPIDEMIA, UNSPECIFIED HYPERLIPIDEMIA TYPE: Primary | ICD-10-CM

## 2018-06-13 DIAGNOSIS — E78.5 HYPERLIPIDEMIA, UNSPECIFIED HYPERLIPIDEMIA TYPE: ICD-10-CM

## 2018-06-13 RX ORDER — LOSARTAN POTASSIUM 100 MG/1
100 TABLET ORAL DAILY
Qty: 90 TAB | Refills: 1 | Status: SHIPPED | OUTPATIENT
Start: 2018-06-13 | End: 2018-09-08 | Stop reason: DRUGHIGH

## 2018-06-13 RX ORDER — VERAPAMIL HYDROCHLORIDE 100 MG/1
CAPSULE, EXTENDED RELEASE ORAL
Qty: 90 CAP | Refills: 1 | Status: SHIPPED | OUTPATIENT
Start: 2018-06-13 | End: 2018-12-20 | Stop reason: SDUPTHER

## 2018-06-13 RX ORDER — PRAVASTATIN SODIUM 80 MG/1
80 TABLET ORAL DAILY
Qty: 90 TAB | Refills: 1 | Status: SHIPPED | OUTPATIENT
Start: 2018-06-13 | End: 2019-05-07 | Stop reason: SDUPTHER

## 2018-06-13 NOTE — PROGRESS NOTES
HISTORY OF PRESENT ILLNESS  Jose Elias Sutton is a 50 y.o. female here for follow-up of hypertension and hyperlipidemia. Patient states that systolic blood pressure at home is around 137. She is feeling well and has no complaints today  Hypertension    The history is provided by the patient and medical records. This is a chronic problem. The problem has been gradually worsening. Pertinent negatives include no orthopnea, no peripheral edema and no dizziness. There are no associated agents to hypertension. Risk factors include dyslipidemia. Cholesterol Problem   The history is provided by the patient and medical records. This is a chronic problem. The problem has been gradually worsening. The symptoms are aggravated by eating. The symptoms are relieved by medications. Treatments tried: Pravachol. The treatment provided mild relief. No Known Allergies  Current Outpatient Prescriptions on File Prior to Visit   Medication Sig Dispense Refill    aspirin delayed-release 81 mg tablet TAKE 2 TABLETS BY MOUTH DAILY 90 Tab 0    pravastatin (PRAVACHOL) 80 mg tablet TAKE 1 TABLET BY MOUTH NIGHTLY 90 Tab 0    ONE TOUCH DELICA 33 gauge misc USE TO TEST BLOOD SUGAR ONCE DAILY 100 Lancet 1    ONETOUCH VERIO strip USE TO TEST BLOOD SUGAR ONCE DAILY 100 Strip 0    dicyclomine (BENTYL) 20 mg tablet TAKE 1 TABLET BY MOUTH THREE TIMES DAILY AS NEEDED 90 Tab 0    losartan (COZAAR) 100 mg tablet Take 50 mg by mouth daily.  verapamil ER (VERELAN PM) 100 mg capsule TAKE 1 CAPSULE BY MOUTH EVERY NIGHT 90 Cap 1     No current facility-administered medications on file prior to visit. Past Medical History:   Diagnosis Date    Elevated blood pressure     Hypercholesterolemia     IBS (irritable bowel syndrome)      Past Surgical History:   Procedure Laterality Date    HX CHOLECYSTECTOMY      HX HYSTERECTOMY       This is going to breaking in half N take the whole tablet  Review of Systems   Constitutional: Negative. Eyes: Negative. Respiratory: Negative. Cardiovascular: Negative. Negative for orthopnea. Musculoskeletal: Negative. Neurological: Negative. Negative for dizziness. Endo/Heme/Allergies: Negative. Psychiatric/Behavioral: Negative. Visit Vitals    /88 (BP 1 Location: Left arm, BP Patient Position: Sitting)    Pulse (!) 53    Temp 96.9 °F (36.1 °C) (Oral)    Resp 20    Ht 5' 5\" (1.651 m)    Wt 187 lb 3.2 oz (84.9 kg)    SpO2 100%    BMI 31.15 kg/m2       Physical Exam   Constitutional: She is oriented to person, place, and time. She appears well-developed and well-nourished. HENT:   Head: Normocephalic and atraumatic. Cardiovascular: Normal rate, regular rhythm, normal heart sounds and intact distal pulses. Exam reveals no gallop and no friction rub. No murmur heard. Pulmonary/Chest: Effort normal and breath sounds normal. No respiratory distress. She has no wheezes. She has no rales. Musculoskeletal: Normal range of motion. She exhibits no edema, tenderness or deformity. Neurological: She is alert and oriented to person, place, and time. No cranial nerve deficit. Coordination normal.   Skin: Skin is warm and dry. No rash noted. No erythema. No pallor. Psychiatric: She has a normal mood and affect. Her behavior is normal. Judgment and thought content normal.   Nursing note and vitals reviewed. ASSESSMENT and PLAN    ICD-10-CM ICD-9-CM    1. Hyperlipidemia, unspecified hyperlipidemia type E78.5 272.4 LIPID PANEL      METABOLIC PANEL, COMPREHENSIVE   2. Essential hypertension I10 401.9 verapamil ER (VERELAN PM) 100 mg capsule      METABOLIC PANEL, COMPREHENSIVE     Follow-up Disposition:  Return in about 4 weeks (around 7/11/2018).

## 2018-06-13 NOTE — PROGRESS NOTES
Neli Gutierrez is a 50 y.o. female (: 1965) presenting to address:    Chief Complaint   Patient presents with    Hypertension     follow up    Cholesterol Problem     follow up    Medication Refill       Vitals:    18 1733   BP: 157/88   Pulse: (!) 53   Resp: 20   Temp: 96.9 °F (36.1 °C)   TempSrc: Oral   SpO2: 100%   Weight: 187 lb 3.2 oz (84.9 kg)   Height: 5' 5\" (1.651 m)   PainSc:   0 - No pain       Hearing/Vision:   No exam data present    Learning Assessment:     Learning Assessment 11/3/2015   PRIMARY LEARNER Patient   PRIMARY LANGUAGE ENGLISH   LEARNER PREFERENCE PRIMARY READING   ANSWERED BY patient   RELATIONSHIP SELF     Depression Screening:     PHQ over the last two weeks 2018   Little interest or pleasure in doing things Not at all   Feeling down, depressed or hopeless Not at all   Total Score PHQ 2 0     Fall Risk Assessment:     Fall Risk Assessment, last 12 mths 2017   Able to walk? Yes   Fall in past 12 months? No     Abuse Screening:     Abuse Screening Questionnaire 2017   Do you ever feel afraid of your partner? N   Are you in a relationship with someone who physically or mentally threatens you? N   Is it safe for you to go home? Y     Coordination of Care Questionaire:   1. Have you been to the ER, urgent care clinic since your last visit? Hospitalized since your last visit? NO    2. Have you seen or consulted any other health care providers outside of the [address] [place] [address] since your last visit? Include any pap smears or colon screening.  NO

## 2018-06-21 DIAGNOSIS — I10 ESSENTIAL HYPERTENSION: ICD-10-CM

## 2018-06-21 RX ORDER — LOSARTAN POTASSIUM 100 MG/1
TABLET ORAL
Qty: 30 TAB | Refills: 0 | Status: SHIPPED | OUTPATIENT
Start: 2018-06-21 | End: 2018-07-21 | Stop reason: SDUPTHER

## 2018-07-12 RX ORDER — PRAVASTATIN SODIUM 80 MG/1
TABLET ORAL
Qty: 90 TAB | Refills: 0 | Status: SHIPPED | OUTPATIENT
Start: 2018-07-12 | End: 2018-09-08 | Stop reason: SDUPTHER

## 2018-07-14 RX ORDER — DICYCLOMINE HYDROCHLORIDE 20 MG/1
TABLET ORAL
Qty: 90 TAB | Refills: 0 | Status: SHIPPED | OUTPATIENT
Start: 2018-07-14 | End: 2018-08-28 | Stop reason: SDUPTHER

## 2018-07-14 NOTE — PROGRESS NOTES
Received page at 11:55pm that patient was out of bentyl and needed refill. Medication refilled but asked that refill requests be made to her PCP prior to running out and during normal office hours in the future.

## 2018-07-21 DIAGNOSIS — I10 ESSENTIAL HYPERTENSION: ICD-10-CM

## 2018-07-24 RX ORDER — LOSARTAN POTASSIUM 100 MG/1
TABLET ORAL
Qty: 30 TAB | Refills: 0 | Status: SHIPPED | OUTPATIENT
Start: 2018-07-24 | End: 2018-10-25

## 2018-07-24 RX ORDER — LOSARTAN POTASSIUM 100 MG/1
TABLET ORAL
Qty: 30 TAB | Refills: 0 | Status: SHIPPED | OUTPATIENT
Start: 2018-07-24 | End: 2018-09-08 | Stop reason: SDUPTHER

## 2018-07-25 ENCOUNTER — TELEPHONE (OUTPATIENT)
Dept: FAMILY MEDICINE CLINIC | Age: 53
End: 2018-07-25

## 2018-07-25 ENCOUNTER — PATIENT OUTREACH (OUTPATIENT)
Dept: FAMILY MEDICINE CLINIC | Age: 53
End: 2018-07-25

## 2018-07-25 DIAGNOSIS — Z12.31 ENCOUNTER FOR SCREENING MAMMOGRAM FOR BREAST CANCER: Primary | ICD-10-CM

## 2018-08-21 RX ORDER — ASPIRIN 81 MG/1
TABLET ORAL
Qty: 90 TAB | Refills: 0 | Status: SHIPPED | OUTPATIENT
Start: 2018-08-21 | End: 2018-09-08 | Stop reason: SDUPTHER

## 2018-09-04 NOTE — TELEPHONE ENCOUNTER
Requested Prescriptions     Pending Prescriptions Disp Refills    dicyclomine (BENTYL) 20 mg tablet [Pharmacy Med Name: DICYCLOMINE 20MG TABLETS] 90 Tab 0     Sig: TAKE 1 TABLET BY MOUTH THREE TIMES DAILY AS NEEDED     Patient calling to request refill on:      Remaining pills: 2  Last appt: 6-13-18  Next appt: 9-8-18    Pharmacy: [facility]    Patient following up on pharmacy request.      Patient aware of 72 hour time frame.

## 2018-09-05 ENCOUNTER — PATIENT OUTREACH (OUTPATIENT)
Dept: FAMILY MEDICINE CLINIC | Age: 53
End: 2018-09-05

## 2018-09-05 RX ORDER — DICYCLOMINE HYDROCHLORIDE 20 MG/1
TABLET ORAL
Qty: 270 TAB | Refills: 1 | OUTPATIENT
Start: 2018-09-05

## 2018-09-05 RX ORDER — DICYCLOMINE HYDROCHLORIDE 20 MG/1
TABLET ORAL
Qty: 90 TAB | Refills: 0 | Status: SHIPPED | OUTPATIENT
Start: 2018-09-05 | End: 2018-11-02 | Stop reason: SDUPTHER

## 2018-09-07 LAB
A-G RATIO,AGRAT: 1.3 RATIO (ref 1.1–2.6)
ALBUMIN SERPL-MCNC: 4 G/DL (ref 3.5–5)
ALP SERPL-CCNC: 78 U/L (ref 25–115)
ALT SERPL-CCNC: 13 U/L (ref 5–40)
ANION GAP SERPL CALC-SCNC: 17 MMOL/L
AST SERPL W P-5'-P-CCNC: 16 U/L (ref 10–37)
BILIRUB SERPL-MCNC: 0.3 MG/DL (ref 0.2–1.2)
BUN SERPL-MCNC: 18 MG/DL (ref 6–22)
CALCIUM SERPL-MCNC: 9.3 MG/DL (ref 8.4–10.5)
CHLORIDE SERPL-SCNC: 101 MMOL/L (ref 98–110)
CHOLEST SERPL-MCNC: 175 MG/DL (ref 110–200)
CO2 SERPL-SCNC: 24 MMOL/L (ref 20–32)
CREAT SERPL-MCNC: 0.9 MG/DL (ref 0.5–1.2)
GFRAA, 66117: >60
GFRNA, 66118: >60
GLOBULIN,GLOB: 3 G/DL (ref 2–4)
GLUCOSE SERPL-MCNC: 83 MG/DL (ref 70–99)
HDLC SERPL-MCNC: 2.5 MG/DL (ref 0–5)
HDLC SERPL-MCNC: 70 MG/DL (ref 40–59)
LDLC SERPL CALC-MCNC: 95 MG/DL (ref 50–99)
POTASSIUM SERPL-SCNC: 4 MMOL/L (ref 3.5–5.5)
PROT SERPL-MCNC: 7 G/DL (ref 6.4–8.3)
SODIUM SERPL-SCNC: 142 MMOL/L (ref 133–145)
TRIGL SERPL-MCNC: 49 MG/DL (ref 40–149)
VLDLC SERPL CALC-MCNC: 10 MG/DL (ref 8–30)

## 2018-09-08 ENCOUNTER — OFFICE VISIT (OUTPATIENT)
Dept: FAMILY MEDICINE CLINIC | Age: 53
End: 2018-09-08

## 2018-09-08 VITALS
DIASTOLIC BLOOD PRESSURE: 88 MMHG | OXYGEN SATURATION: 100 % | HEART RATE: 56 BPM | TEMPERATURE: 97.8 F | BODY MASS INDEX: 31.32 KG/M2 | SYSTOLIC BLOOD PRESSURE: 146 MMHG | RESPIRATION RATE: 16 BRPM | WEIGHT: 188 LBS | HEIGHT: 65 IN

## 2018-09-08 DIAGNOSIS — Z12.39 SCREENING FOR BREAST CANCER: ICD-10-CM

## 2018-09-08 DIAGNOSIS — I10 ESSENTIAL HYPERTENSION: Primary | ICD-10-CM

## 2018-09-08 DIAGNOSIS — Z23 ENCOUNTER FOR IMMUNIZATION: ICD-10-CM

## 2018-09-08 DIAGNOSIS — E78.5 HYPERLIPIDEMIA, UNSPECIFIED HYPERLIPIDEMIA TYPE: ICD-10-CM

## 2018-09-08 RX ORDER — IRBESARTAN 300 MG/1
300 TABLET ORAL
Qty: 30 TAB | Refills: 6 | Status: SHIPPED | OUTPATIENT
Start: 2018-09-08 | End: 2018-09-08 | Stop reason: SDUPTHER

## 2018-09-08 NOTE — PATIENT INSTRUCTIONS
Low Sodium Diet (2,000 Milligram): Care Instructions  Your Care Instructions    Too much sodium causes your body to hold on to extra water. This can raise your blood pressure and force your heart and kidneys to work harder. In very serious cases, this could cause you to be put in the hospital. It might even be life-threatening. By limiting sodium, you will feel better and lower your risk of serious problems. The most common source of sodium is salt. People get most of the salt in their diet from canned, prepared, and packaged foods. Fast food and restaurant meals also are very high in sodium. Your doctor will probably limit your sodium to less than 2,000 milligrams (mg) a day. This limit counts all the sodium in prepared and packaged foods and any salt you add to your food. Follow-up care is a key part of your treatment and safety. Be sure to make and go to all appointments, and call your doctor if you are having problems. It's also a good idea to know your test results and keep a list of the medicines you take. How can you care for yourself at home? Read food labels  · Read labels on cans and food packages. The labels tell you how much sodium is in each serving. Make sure that you look at the serving size. If you eat more than the serving size, you have eaten more sodium. · Food labels also tell you the Percent Daily Value for sodium. Choose products with low Percent Daily Values for sodium. · Be aware that sodium can come in forms other than salt, including monosodium glutamate (MSG), sodium citrate, and sodium bicarbonate (baking soda). MSG is often added to Asian food. When you eat out, you can sometimes ask for food without MSG or added salt. Buy low-sodium foods  · Buy foods that are labeled \"unsalted\" (no salt added), \"sodium-free\" (less than 5 mg of sodium per serving), or \"low-sodium\" (less than 140 mg of sodium per serving).  Foods labeled \"reduced-sodium\" and \"light sodium\" may still have too much sodium. Be sure to read the label to see how much sodium you are getting. · Buy fresh vegetables, or frozen vegetables without added sauces. Buy low-sodium versions of canned vegetables, soups, and other canned goods. Prepare low-sodium meals  · Cut back on the amount of salt you use in cooking. This will help you adjust to the taste. Do not add salt after cooking. One teaspoon of salt has about 2,300 mg of sodium. · Take the salt shaker off the table. · Flavor your food with garlic, lemon juice, onion, vinegar, herbs, and spices. Do not use soy sauce, lite soy sauce, steak sauce, onion salt, garlic salt, celery salt, mustard, or ketchup on your food. · Use low-sodium salad dressings, sauces, and ketchup. Or make your own salad dressings and sauces without adding salt. · Use less salt (or none) when recipes call for it. You can often use half the salt a recipe calls for without losing flavor. Other foods such as rice, pasta, and grains do not need added salt. · Rinse canned vegetables, and cook them in fresh water. This removes some-but not all-of the salt. · Avoid water that is naturally high in sodium or that has been treated with water softeners, which add sodium. Call your local water company to find out the sodium content of your water supply. If you buy bottled water, read the label and choose a sodium-free brand. Avoid high-sodium foods  · Avoid eating:  ¨ Smoked, cured, salted, and canned meat, fish, and poultry. ¨ Ham, hernandez, hot dogs, and luncheon meats. ¨ Regular, hard, and processed cheese and regular peanut butter. ¨ Crackers with salted tops, and other salted snack foods such as pretzels, chips, and salted popcorn. ¨ Frozen prepared meals, unless labeled low-sodium. ¨ Canned and dried soups, broths, and bouillon, unless labeled sodium-free or low-sodium. ¨ Canned vegetables, unless labeled sodium-free or low-sodium. ¨ [place] Footman fries, pizza, tacos, and other fast foods.   [place], olives, ketchup, and other condiments, especially soy sauce, unless labeled sodium-free or low-sodium. Where can you learn more? Go to http://marcus-carmelo.info/. Enter Y787 in the search box to learn more about \"Low Sodium Diet (2,000 Milligram): Care Instructions. \"  Current as of: May 12, 2017  Content Version: 11.7  © 9984-5581 [employer]. Care instructions adapted under license by [employer] (which disclaims liability or warranty for this information). If you have questions about a medical condition or this instruction, always ask your healthcare professional. [address] any warranty or liability for your use of this information. Hyperlipidemia: After Your Visit  Your Care Instructions  Hyperlipidemia is too much fat in your blood. The body has several kinds of fat, including cholesterol and triglycerides. Your body needs fat for many things, such as making new cells. But too much fat in your blood increases your chances of having a heart attack or stroke. You may be able to lower your cholesterol and triglycerides with a heart-healthy diet, exercise, and if needed, medicine. Your doctor may want you to try lifestyle changes first to see whether they lower the fat in your blood. You may need to take medicine if lifestyle changes do not lower the fat in your blood enough. Follow-up care is a key part of your treatment and safety. Be sure to make and go to all appointments, and call your doctor if you are having problems. Its also a good idea to know your test results and keep a list of the medicines you take. How can you care for yourself at home? Take your medicines  · Take your medicines exactly as prescribed. Call your doctor if you think you are having a problem with your medicine. · If you take medicine to lower your cholesterol, go to follow-up visits. You will need to have blood tests.   · Do not take large doses of niacin, which is a B vitamin, while taking medicine called statins. It may increase the chance of muscle pain and liver problems. · Talk to your doctor about avoiding grapefruit juice if you are taking statins. Grapefruit juice can raise the level of this medicine in your blood. This could increase side effects. Eat more fruits, vegetables, and fiber  · Fruits and vegetables have lots of nutrients that help protect against heart disease, and they have little--if any--fat. Try to eat at least five servings a day. Dark green, deep orange, or yellow fruits and vegetables are healthy choices. · Keep carrots, celery, and other veggies handy for snacks. Buy fruit that is in season and store it where you can see it so that you will be tempted to eat it. Cook dishes that have a lot of veggies in them, such as stir-fries and soups. · Foods high in fiber may reduce your cholesterol and provide important vitamins and minerals. High-fiber foods include whole-grain cereals and breads, oatmeal, beans, brown rice, citrus fruits, and apples. · Buy whole-grain breads and cereals instead of white bread and pastries. Limit saturated fat  · Read food labels and try to avoid saturated fat and trans fat. They increase your risk of heart disease. · Use olive or canola oil when you cook. Try cholesterol-lowering spreads, such as Benecol or Take Control. · Bake, broil, grill, or steam foods instead of frying them. · Limit the amount of high-fat meats you eat, including hot dogs and sausages. Cut out all visible fat when you prepare meat. · Eat fish, skinless poultry, and soy products such as tofu instead of high-fat meats. Soybeans may be especially good for your heart. Eat at least two servings of fish a week. Certain fish, such as salmon, contain omega-3 fatty acids, which may help reduce your risk of heart attack. · Choose low-fat or fat-free milk and dairy products. Get exercise, limit alcohol, and quit smoking  · Get more exercise. Work with your doctor to set up an exercise program. Even if you can do only a small amount, exercise will help you get stronger, have more energy, and manage your weight and your stress. Walking is an easy way to get exercise. Gradually increase the amount you walk every day. Aim for at least 30 minutes on most days of the week. You also may want to swim, bike, or do other activities. · Limit alcohol to no more than 2 drinks a day for men and 1 drink a day for women. · Do not smoke. If you need help quitting, talk to your doctor about stop-smoking programs and medicines. These can increase your chances of quitting for good. When should you call for help? Call 911 anytime you think you may need emergency care. For example, call if:  · You have symptoms of a heart attack. These may include:  ¨ Chest pain or pressure, or a strange feeling in the chest.  ¨ Sweating. ¨ Shortness of breath. ¨ Nausea or vomiting. ¨ Pain, pressure, or a strange feeling in the back, neck, jaw, or upper belly or in one or both shoulders or arms. ¨ Lightheadedness or sudden weakness. ¨ A fast or irregular heartbeat. After you call 911, the  may tell you to chew 1 adult-strength or 2 to 4 low-dose aspirin. Wait for an ambulance. Do not try to drive yourself. · You have signs of a stroke. These may include:  ¨ Sudden numbness, paralysis, or weakness in your face, arm, or leg, especially on only one side of your body. ¨ New problems with walking or balance. ¨ Sudden vision changes. ¨ Drooling or slurred speech. ¨ New problems speaking or understanding simple statements, or feeling confused. ¨ A sudden, severe headache that is different from past headaches. · You passed out (lost consciousness). Call your doctor now or seek immediate medical care if:  · You have muscle pain or weakness. Watch closely for changes in your health, and be sure to contact your doctor if:  · You are very tired.   · You have an upset stomach, gas, constipation, or belly pain or cramps. Where can you learn more? Go to [employer]  Enter C406 in the search box to learn more about \"Hyperlipidemia: After Your Visit. \"   © 3780-9258 [employer]. Care instructions adapted under license by [employer] (which disclaims liability or warranty for this information). This care instruction is for use with your licensed healthcare professional. If you have questions about a medical condition or this instruction, always ask your healthcare professional. [place] [address] any warranty or liability for your use of this information. Content Version: 8.5.399090; Last Revised: October 13, 2011                 Learning About High Cholesterol  What is high cholesterol? Cholesterol is a type of fat in your blood. It is needed for many body functions, such as making new cells. Cholesterol is made by your body. It also comes from food you eat. If you have too much cholesterol, it starts to build up in your arteries. This is called hardening of the arteries, or atherosclerosis. High cholesterol raises your risk of a heart attack and stroke. There are different types of cholesterol. LDL is the \"bad\" cholesterol. High LDL can raise your risk for heart disease, heart attack, and stroke. HDL is the \"good\" cholesterol. High HDL is linked with a lower risk for heart disease, heart attack, and stroke. Your cholesterol levels help your doctor find out your risk for having a heart attack or stroke. How can you prevent high cholesterol? A heart-healthy lifestyle can help you prevent high cholesterol. This lifestyle helps lower your risk for a heart attack and stroke. · Eat heart-healthy foods. ¨ Eat fruits, vegetables, whole grains (like oatmeal), dried beans and peas, nuts and seeds, soy products (like tofu), and fat-free or low-fat dairy products.   ¨ Replace butter, margarine, and hydrogenated or partially hydrogenated oils with olive and canola oils. (Canola oil margarine without trans fat is fine.)  ¨ Replace red meat with fish, poultry, and soy protein (like tofu). ¨ Limit processed and packaged foods like chips, crackers, and cookies. · Be active. Exercise can improve your cholesterol level. Get at least 30 minutes of exercise on most days of the week. Walking is a good choice. You also may want to do other activities, such as running, swimming, cycling, or playing tennis or team sports. · Stay at a healthy weight. Lose weight if you need to. · Don't smoke. If you need help quitting, talk to your doctor about stop-smoking programs and medicines. These can increase your chances of quitting for good. How is high cholesterol treated? The goal of treatment is to reduce your chances of having a heart attack or stroke. The goal is not to lower your cholesterol numbers only. · You may make lifestyle changes, such as eating healthy foods, not smoking, losing weight, and being more active. · You may have to take medicine. Follow-up care is a key part of your treatment and safety. Be sure to make and go to all appointments, and call your doctor if you are having problems. It's also a good idea to know your test results and keep a list of the medicines you take. Where can you learn more? Go to http://marcus-carmelo.info/. Enter W302 in the search box to learn more about \"Learning About High Cholesterol. \"  Current as of: May 10, 2017  Content Version: 11.7  © 6959-4551 [employer]. Care instructions adapted under license by [employer] (which disclaims liability or warranty for this information). If you have questions about a medical condition or this instruction, always ask your healthcare professional. [address] any warranty or liability for your use of this information.   Please cut verapamil in half and take one half tablet along with Avapro

## 2018-09-08 NOTE — PROGRESS NOTES
HISTORY OF PRESENT ILLNESS  Vicente Casas is a 48 y.o. female here for follow-up of hypertension. Patient's blood pressures have been averaging around 627 systolic. She has a history of a uterine fibroid and is complaining about a lump in her pelvis. Mass   The history is provided by the patient (Patient complains of suprapubic mass. ). This is a chronic problem. The problem has not changed since onset. Pertinent negatives include no chest pain, no abdominal pain, no headaches and no shortness of breath. Nothing aggravates the symptoms. Nothing relieves the symptoms. Hypertension    The history is provided by the patient and medical records. This is a chronic problem. The problem has been gradually worsening. Pertinent negatives include no chest pain, no orthopnea, no headaches, no peripheral edema, no dizziness and no shortness of breath. There are no associated agents to hypertension. Risk factors include dyslipidemia. Cholesterol Problem   The history is provided by the patient and medical records. This is a chronic problem. The problem has been rapidly improving. Pertinent negatives include no chest pain, no abdominal pain, no headaches and no shortness of breath. The symptoms are aggravated by eating. The symptoms are relieved by medications. Treatments tried: Pravachol. The treatment provided mild relief. Other   The history is provided by the medical records (Patient is due for breast cancer screening). Pertinent negatives include no chest pain, no abdominal pain, no headaches and no shortness of breath.      No Known Allergies  Current Outpatient Prescriptions on File Prior to Visit   Medication Sig Dispense Refill    dicyclomine (BENTYL) 20 mg tablet TAKE 1 TABLET BY MOUTH THREE TIMES DAILY AS NEEDED (Patient taking differently: TAKE 1 TABLET BY MOUTH 2-3 TIMES DAILY AS NEEDED) 90 Tab 0    losartan (COZAAR) 100 mg tablet TAKE 1 TABLET BY MOUTH DAILY 30 Tab 0    verapamil ER (VERELAN PM) 100 mg capsule TAKE 1 CAPSULE BY MOUTH EVERY NIGHT 90 Cap 1    pravastatin (PRAVACHOL) 80 mg tablet Take 1 Tab by mouth daily. 90 Tab 1    aspirin delayed-release 81 mg tablet TAKE 2 TABLETS BY MOUTH DAILY 90 Tab 0    ONETOUCH VERIO strip USE TO TEST BLOOD SUGAR ONCE DAILY 100 Strip 0     No current facility-administered medications on file prior to visit. Past Medical History:   Diagnosis Date    Elevated blood pressure     Hypercholesterolemia     IBS (irritable bowel syndrome)      Past Surgical History:   Procedure Laterality Date    HX CHOLECYSTECTOMY      HX HYSTERECTOMY       Family History   Problem Relation Age of Onset    Hypertension Mother     Diabetes Mother     Elevated Lipids Mother     Hypertension Father    Orma Damme Elevated Lipids Father     Hypertension Sister     Diabetes Sister     Hypertension Brother     Hypertension Sister     Elevated Lipids Sister      Social History     Social History    Marital status:      Spouse name: N/A    Number of children: N/A    Years of education: N/A     Occupational History    Not on file. Social History Main Topics    Smoking status: Never Smoker    Smokeless tobacco: Never Used    Alcohol use No    Drug use: No    Sexual activity: Yes     Partners: Male     Other Topics Concern    Not on file     Social History Narrative       Review of Systems   Constitutional: Negative. Eyes: Negative. Respiratory: Negative. Negative for shortness of breath. Cardiovascular: Negative. Negative for chest pain and orthopnea. Gastrointestinal: Negative for abdominal pain. Musculoskeletal: Negative. Neurological: Negative. Negative for dizziness and headaches. Endo/Heme/Allergies: Negative. Psychiatric/Behavioral: Negative.       Visit Vitals    /88    Pulse (!) 56    Temp 97.8 °F (36.6 °C) (Oral)    Resp 16    Ht 5' 5\" (1.651 m)    Wt 188 lb (85.3 kg)    SpO2 100%    BMI 31.28 kg/m2       Physical Exam Constitutional: She is oriented to person, place, and time. She appears well-developed and well-nourished. HENT:   Head: Normocephalic and atraumatic. Cardiovascular: Normal rate, regular rhythm, normal heart sounds and intact distal pulses. Exam reveals no gallop and no friction rub. No murmur heard. Pulmonary/Chest: Effort normal and breath sounds normal. No respiratory distress. She has no wheezes. She has no rales. Abdominal: Soft. Bowel sounds are normal. She exhibits mass. She exhibits no distension. There is no tenderness. There is no rebound and no guarding. There is a palpable 7 x 7 mass in the suprapubic area questionably a uterine fibroid. Musculoskeletal: Normal range of motion. She exhibits no edema, tenderness or deformity. Neurological: She is alert and oriented to person, place, and time. No cranial nerve deficit. Coordination normal.   Skin: Skin is warm and dry. No rash noted. No erythema. No pallor. Psychiatric: She has a normal mood and affect. Her behavior is normal. Judgment and thought content normal.   Nursing note and vitals reviewed. ASSESSMENT and PLAN    ICD-10-CM ICD-9-CM    1. Essential hypertension I10 401.9 irbesartan (AVAPRO) 300 mg tablet   2. Screening for breast cancer Z12.31 V76.10 G[facility] MAMMO BI SCREENING INCL CAD   3. Hyperlipidemia, unspecified hyperlipidemia type E78.5 272.4    4. Mass R22.9 782.2      Follow-up Disposition:  Return in about 2 weeks (around 9/22/2018). Patient has been instructed to cut verapamil in half and discontinue losartan and start Avapro. She is to follow-up with her gynecologist for probable uterine fibroid.

## 2018-09-08 NOTE — PROGRESS NOTES
Delia Pantoja is a 48 y.o. female presents in office to be seen for lump on abdomen left side x 4 weeks. Health Maintenance Due   Topic Date Due    BREAST CANCER SCRN MAMMOGRAM  04/28/2018    Influenza Age 5 to Adult  08/01/2018       1. Have you been to the ER, urgent care clinic since your last visit? Hospitalized since your last visit?no    2. Have you seen or consulted any other health care providers outside of the [address] [place] [address] since your last visit? Include any pap smears or colon screening.  no  ]

## 2018-09-08 NOTE — MR AVS SNAPSHOT
[address] [place] [address] E [facility] [address] 
[phone] Patient: Lucian Melendez MRN: [id number] :1965 Visit Information Date & Time Provider Department Dept. Phone Encounter #  
 2018 10:30 AM Reno Trevino MD [facility] [phone] [id number] Upcoming Health Maintenance Date Due  
 BREAST CANCER SCRN MAMMOGRAM 2018 Influenza Age 5 to Adult 2018 PAP AKA CERVICAL CYTOLOGY 2019 COLONOSCOPY 2020 DTaP/Tdap/Td series (2 - Td) 3/22/2027 Allergies as of 2018  Review Complete On: 2018 By: Reno Trevino MD  
 No Known Allergies Current Immunizations  Never Reviewed No immunizations on file. Not reviewed this visit You Were Diagnosed With   
  
 Codes Comments Screening for breast cancer    -  Primary ICD-10-CM: Z12.31 
ICD-9-CM: V76.10 Essential hypertension     ICD-10-CM: I10 
ICD-9-CM: 401.9 Hyperlipidemia, unspecified hyperlipidemia type     ICD-10-CM: E78.5 ICD-9-CM: 272.4 Vitals BP Pulse Temp Resp Height(growth percentile) Weight(growth percentile) 146/88 (!) 56 97.8 °F (36.6 °C) (Oral) 16 5' 5\" (1.651 m) 188 lb (85.3 kg) SpO2 BMI OB Status Smoking Status 100% 31.28 kg/m2 Menopause Never Smoker Vitals History BMI and BSA Data Body Mass Index Body Surface Area  
 31.28 kg/m 2 1.98 m 2 Preferred Pharmacy Pharmacy Name Phone [facility] [address], [address] [address] [phone] Your Updated Medication List  
  
   
This list is accurate as of 18 11:17 AM.  Always use your most recent med list.  
  
  
  
  
 aspirin delayed-release 81 mg tablet TAKE 2 TABLETS BY MOUTH DAILY  
  
 dicyclomine 20 mg tablet Commonly known as:  BENTYL TAKE 1 TABLET BY MOUTH THREE TIMES DAILY AS NEEDED  
  
 irbesartan 300 mg tablet Commonly known as:  AVAPRO Take 1 Tab by mouth nightly. losartan 100 mg tablet Commonly known as:  COZAAR  
TAKE 1 TABLET BY MOUTH DAILY  
  
 ONETOUCH VERIO strip Generic drug:  glucose blood VI test strips USE TO TEST BLOOD SUGAR ONCE DAILY pravastatin 80 mg tablet Commonly known as:  PRAVACHOL Take 1 Tab by mouth daily. verapamil  mg capsule Commonly known as:  VERELAN PM  
TAKE 1 CAPSULE BY MOUTH EVERY NIGHT Prescriptions Sent to Pharmacy Refills  
 irbesartan (AVAPRO) 300 mg tablet 6 Sig: Take 1 Tab by mouth nightly. Class: Normal  
 Pharmacy: [facility] [address], [address] [address] Ph #: [phone] Route: Oral  
  
To-Do List   
 09/08/2018 Imaging:  GUANKAO MAMMO BI SCREENING INCL CAD Patient Instructions Low Sodium Diet (2,000 Milligram): Care Instructions Your Care Instructions Too much sodium causes your body to hold on to extra water. This can raise your blood pressure and force your heart and kidneys to work harder. In very serious cases, this could cause you to be put in the hospital. It might even be life-threatening. By limiting sodium, you will feel better and lower your risk of serious problems. The most common source of sodium is salt. People get most of the salt in their diet from canned, prepared, and packaged foods. Fast food and restaurant meals also are very high in sodium. Your doctor will probably limit your sodium to less than 2,000 milligrams (mg) a day. This limit counts all the sodium in prepared and packaged foods and any salt you add to your food. Follow-up care is a key part of your treatment and safety. Be sure to make and go to all appointments, and call your doctor if you are having problems.  It's also a good idea to know your test results and keep a list of the medicines you take. How can you care for yourself at home? Read food labels · Read labels on cans and food packages. The labels tell you how much sodium is in each serving. Make sure that you look at the serving size. If you eat more than the serving size, you have eaten more sodium. · Food labels also tell you the Percent Daily Value for sodium. Choose products with low Percent Daily Values for sodium. · Be aware that sodium can come in forms other than salt, including monosodium glutamate (MSG), sodium citrate, and sodium bicarbonate (baking soda). MSG is often added to Asian food. When you eat out, you can sometimes ask for food without MSG or added salt. Buy low-sodium foods · Buy foods that are labeled \"unsalted\" (no salt added), \"sodium-free\" (less than 5 mg of sodium per serving), or \"low-sodium\" (less than 140 mg of sodium per serving). Foods labeled \"reduced-sodium\" and \"light sodium\" may still have too much sodium. Be sure to read the label to see how much sodium you are getting. · Buy fresh vegetables, or frozen vegetables without added sauces. Buy low-sodium versions of canned vegetables, soups, and other canned goods. Prepare low-sodium meals · Cut back on the amount of salt you use in cooking. This will help you adjust to the taste. Do not add salt after cooking. One teaspoon of salt has about 2,300 mg of sodium. · Take the salt shaker off the table. · Flavor your food with garlic, lemon juice, onion, vinegar, herbs, and spices. Do not use soy sauce, lite soy sauce, steak sauce, onion salt, garlic salt, celery salt, mustard, or ketchup on your food. · Use low-sodium salad dressings, sauces, and ketchup. Or make your own salad dressings and sauces without adding salt. · Use less salt (or none) when recipes call for it. You can often use half the salt a recipe calls for without losing flavor. Other foods such as rice, pasta, and grains do not need added salt.  
· Rinse canned vegetables, and cook them in fresh water. This removes some-but not all-of the salt. · Avoid water that is naturally high in sodium or that has been treated with water softeners, which add sodium. Call your local water company to find out the sodium content of your water supply. If you buy bottled water, read the label and choose a sodium-free brand. Avoid high-sodium foods · Avoid eating: ¨ Smoked, cured, salted, and canned meat, fish, and poultry. ¨ Ham, hernandez, hot dogs, and luncheon meats. ¨ Regular, hard, and processed cheese and regular peanut butter. ¨ Crackers with salted tops, and other salted snack foods such as pretzels, chips, and salted popcorn. ¨ Frozen prepared meals, unless labeled low-sodium. ¨ Canned and dried soups, broths, and bouillon, unless labeled sodium-free or low-sodium. ¨ Canned vegetables, unless labeled sodium-free or low-sodium. ¨ Western Zita fries, pizza, tacos, and other fast foods. ¨ Pickles, olives, ketchup, and other condiments, especially soy sauce, unless labeled sodium-free or low-sodium. Where can you learn more? Go to http://marcus-carmelo.info/. Enter D552 in the search box to learn more about \"Low Sodium Diet (2,000 Milligram): Care Instructions. \" Current as of: May 12, 2017 Content Version: 11.7 © 4110-6955 Solidarium. Care instructions adapted under license by [employer] (which disclaims liability or warranty for this information). If you have questions about a medical condition or this instruction, always ask your healthcare professional. [place] [address] any warranty or liability for your use of this information. Hyperlipidemia: After Your Visit Your Care Instructions Hyperlipidemia is too much fat in your blood. The body has several kinds of fat, including cholesterol and triglycerides. Your body needs fat for many things, such as making new cells.  But too much fat in your blood increases your chances of having a heart attack or stroke. You may be able to lower your cholesterol and triglycerides with a heart-healthy diet, exercise, and if needed, medicine. Your doctor may want you to try lifestyle changes first to see whether they lower the fat in your blood. You may need to take medicine if lifestyle changes do not lower the fat in your blood enough. Follow-up care is a key part of your treatment and safety. Be sure to make and go to all appointments, and call your doctor if you are having problems. Its also a good idea to know your test results and keep a list of the medicines you take. How can you care for yourself at home? Take your medicines · Take your medicines exactly as prescribed. Call your doctor if you think you are having a problem with your medicine. · If you take medicine to lower your cholesterol, go to follow-up visits. You will need to have blood tests. · Do not take large doses of niacin, which is a B vitamin, while taking medicine called statins. It may increase the chance of muscle pain and liver problems. · Talk to your doctor about avoiding grapefruit juice if you are taking statins. Grapefruit juice can raise the level of this medicine in your blood. This could increase side effects. Eat more fruits, vegetables, and fiber · Fruits and vegetables have lots of nutrients that help protect against heart disease, and they have littleif anyfat. Try to eat at least five servings a day. Dark green, deep orange, or yellow fruits and vegetables are healthy choices. · Keep carrots, celery, and other veggies handy for snacks. Buy fruit that is in season and store it where you can see it so that you will be tempted to eat it. Cook dishes that have a lot of veggies in them, such as stir-fries and soups. · Foods high in fiber may reduce your cholesterol and provide important vitamins and minerals.  High-fiber foods include whole-grain cereals and breads, oatmeal, beans, brown rice, citrus fruits, and apples. · Buy whole-grain breads and cereals instead of white bread and pastries. Limit saturated fat · Read food labels and try to avoid saturated fat and trans fat. They increase your risk of heart disease. · Use olive or canola oil when you cook. Try cholesterol-lowering spreads, such as Benecol or Take Control. · Bake, broil, grill, or steam foods instead of frying them. · Limit the amount of high-fat meats you eat, including hot dogs and sausages. Cut out all visible fat when you prepare meat. · Eat fish, skinless poultry, and soy products such as tofu instead of high-fat meats. Soybeans may be especially good for your heart. Eat at least two servings of fish a week. Certain fish, such as salmon, contain omega-3 fatty acids, which may help reduce your risk of heart attack. · Choose low-fat or fat-free milk and dairy products. Get exercise, limit alcohol, and quit smoking · Get more exercise. Work with your doctor to set up an exercise program. Even if you can do only a small amount, exercise will help you get stronger, have more energy, and manage your weight and your stress. Walking is an easy way to get exercise. Gradually increase the amount you walk every day. Aim for at least 30 minutes on most days of the week. You also may want to swim, bike, or do other activities. · Limit alcohol to no more than 2 drinks a day for men and 1 drink a day for women. · Do not smoke. If you need help quitting, talk to your doctor about stop-smoking programs and medicines. These can increase your chances of quitting for good. When should you call for help? Call 911 anytime you think you may need emergency care. For example, call if: 
· You have symptoms of a heart attack. These may include: ¨ Chest pain or pressure, or a strange feeling in the chest. 
¨ Sweating. ¨ Shortness of breath. ¨ Nausea or vomiting.  
¨ Pain, pressure, or a strange feeling in the back, neck, jaw, or upper belly or in one or both shoulders or arms. ¨ Lightheadedness or sudden weakness. ¨ A fast or irregular heartbeat. After you call 911, the  may tell you to chew 1 adult-strength or 2 to 4 low-dose aspirin. Wait for an ambulance. Do not try to drive yourself. · You have signs of a stroke. These may include: 
¨ Sudden numbness, paralysis, or weakness in your face, arm, or leg, especially on only one side of your body. ¨ New problems with walking or balance. ¨ Sudden vision changes. ¨ Drooling or slurred speech. ¨ New problems speaking or understanding simple statements, or feeling confused. ¨ A sudden, severe headache that is different from past headaches. · You passed out (lost consciousness). Call your doctor now or seek immediate medical care if: 
· You have muscle pain or weakness. Watch closely for changes in your health, and be sure to contact your doctor if: 
· You are very tired. · You have an upset stomach, gas, constipation, or belly pain or cramps. Where can you learn more? Go to [facility].be Enter C406 in the search box to learn more about \"Hyperlipidemia: After Your Visit. \"  
© 2505-6804 Healthwise, Incorporated. Care instructions adapted under license by [facility] [employer] (which disclaims liability or warranty for this information). This care instruction is for use with your licensed healthcare professional. If you have questions about a medical condition or this instruction, always ask your healthcare professional. [place] [address] any warranty or liability for your use of this information. Content Version: 9.9.776254; Last Revised: October 13, 2011 Learning About High Cholesterol What is high cholesterol? Cholesterol is a type of fat in your blood. It is needed for many body functions, such as making new cells. Cholesterol is made by your body. It also comes from food you eat.  
If you have too much cholesterol, it starts to build up in your arteries. This is called hardening of the arteries, or atherosclerosis. High cholesterol raises your risk of a heart attack and stroke. There are different types of cholesterol. LDL is the \"bad\" cholesterol. High LDL can raise your risk for heart disease, heart attack, and stroke. HDL is the \"good\" cholesterol. High HDL is linked with a lower risk for heart disease, heart attack, and stroke. Your cholesterol levels help your doctor find out your risk for having a heart attack or stroke. How can you prevent high cholesterol? A heart-healthy lifestyle can help you prevent high cholesterol. This lifestyle helps lower your risk for a heart attack and stroke. · Eat heart-healthy foods. ¨ Eat fruits, vegetables, whole grains (like oatmeal), dried beans and peas, nuts and seeds, soy products (like tofu), and fat-free or low-fat dairy products. ¨ Replace butter, margarine, and hydrogenated or partially hydrogenated oils with olive and canola oils. (Canola oil margarine without trans fat is fine.) ¨ Replace red meat with fish, poultry, and soy protein (like tofu). ¨ Limit processed and packaged foods like chips, crackers, and cookies. · Be active. Exercise can improve your cholesterol level. Get at least 30 minutes of exercise on most days of the week. Walking is a good choice. You also may want to do other activities, such as running, swimming, cycling, or playing tennis or team sports. · Stay at a healthy weight. Lose weight if you need to. · Don't smoke. If you need help quitting, talk to your doctor about stop-smoking programs and medicines. These can increase your chances of quitting for good. How is high cholesterol treated? The goal of treatment is to reduce your chances of having a heart attack or stroke. The goal is not to lower your cholesterol numbers only. · You may make lifestyle changes, such as eating healthy foods, not smoking, losing weight, and being more active.  
· You may have to take medicine. Follow-up care is a key part of your treatment and safety. Be sure to make and go to all appointments, and call your doctor if you are having problems. It's also a good idea to know your test results and keep a list of the medicines you take. Where can you learn more? Go to http://marcus-carmelo.info/. Enter J207 in the search box to learn more about \"Learning About High Cholesterol. \" Current as of: May 10, 2017 Content Version: 11.7 © 3758-3526 Rostima. Care instructions adapted under license by [employer] (which disclaims liability or warranty for this information). If you have questions about a medical condition or this instruction, always ask your healthcare professional. [address] any warranty or liability for your use of this information. Introducing [facility]! [facility] introduces WearYouWant patient portal. Now you can access parts of your medical record, email your doctor's office, and request medication refills online. 1. In your internet browser, go to [email or web address] (by [email or web address]) 2. Click on the First Time User? Click Here link in the Sign In box. You will see the New Member Sign Up page. 3. Enter your WearYouWant Access Code exactly as it appears below. You will not need to use this code after youve completed the sign-up process. If you do not sign up before the expiration date, you must request a new code. · WearYouWant Access Code: [email or web address] Expires: 12/7/2018 11:17 AM 
 
4. Enter the last four digits of your Social Security Number (xxxx) and Date of Birth (mm/dd/yyyy) as indicated and click Submit. You will be taken to the next sign-up page. 5. Create a CeloNovat ID. This will be your WearYouWant login ID and cannot be changed, so think of one that is secure and easy to remember. 6. Create a WearYouWant password. You can change your password at any time.  
7. Enter your Password Reset Question and Answer. This can be used at a later time if you forget your password. 8. Enter your e-mail address. You will receive e-mail notification when new information is available in [address] 9. Click Sign Up. You can now view and download portions of your medical record. 10. Click the Download Summary menu link to download a portable copy of your medical information. If you have questions, please visit the Frequently Asked Questions section of the Pace4Life website. Remember, Pace4Life is NOT to be used for urgent needs. For medical emergencies, dial 911. Now available from your iPhone and Android! Please provide this summary of care documentation to your next provider. Your primary care clinician is listed as Jermaine Conteh. If you have any questions after today's visit, please call [phone].

## 2018-09-12 NOTE — PROGRESS NOTES
Shahram Ding is a 48 y.o. female who presents for routine immunizations. She denies any symptoms , reactions or allergies that would exclude them from being immunized today. Risks and adverse reactions were discussed and the VIS was given to them. All questions were addressed. She was observed for 5 min post injection. There were no reactions observed.     Genoveva Rivers LPN

## 2018-09-19 DIAGNOSIS — I10 ESSENTIAL HYPERTENSION: ICD-10-CM

## 2018-09-19 RX ORDER — LOSARTAN POTASSIUM 100 MG/1
TABLET ORAL
Qty: 30 TAB | Refills: 0 | Status: SHIPPED | OUTPATIENT
Start: 2018-09-19 | End: 2018-10-25

## 2018-10-03 RX ORDER — ASPIRIN 81 MG/1
TABLET ORAL
Qty: 90 TAB | Refills: 0 | Status: SHIPPED | OUTPATIENT
Start: 2018-10-03 | End: 2018-10-25 | Stop reason: SDUPTHER

## 2018-10-08 DIAGNOSIS — I10 ESSENTIAL HYPERTENSION: ICD-10-CM

## 2018-10-08 NOTE — TELEPHONE ENCOUNTER
Requested Prescriptions     Pending Prescriptions Disp Refills    irbesartan (AVAPRO) 300 mg tablet 30 Tab [id number] - [address] [facility], [phone]    They have 3 tablets remaining. Pts last appt was 9/8/18, Next appt is scheduled for 10/15/18. Pt aware of 72 hours time frame.

## 2018-10-10 RX ORDER — IRBESARTAN 300 MG/1
TABLET ORAL
Qty: 90 TAB | Refills: 1 | Status: SHIPPED | OUTPATIENT
Start: 2018-10-10 | End: 2019-05-07 | Stop reason: SDUPTHER

## 2018-10-17 ENCOUNTER — PATIENT OUTREACH (OUTPATIENT)
Dept: FAMILY MEDICINE CLINIC | Age: 53
End: 2018-10-17

## 2018-10-25 ENCOUNTER — OFFICE VISIT (OUTPATIENT)
Dept: FAMILY MEDICINE CLINIC | Age: 53
End: 2018-10-25

## 2018-10-25 VITALS
HEART RATE: 59 BPM | WEIGHT: 189 LBS | TEMPERATURE: 96.2 F | RESPIRATION RATE: 18 BRPM | BODY MASS INDEX: 31.49 KG/M2 | DIASTOLIC BLOOD PRESSURE: 70 MMHG | HEIGHT: 65 IN | OXYGEN SATURATION: 100 % | SYSTOLIC BLOOD PRESSURE: 108 MMHG

## 2018-10-25 DIAGNOSIS — E78.5 HYPERLIPIDEMIA, UNSPECIFIED HYPERLIPIDEMIA TYPE: ICD-10-CM

## 2018-10-25 DIAGNOSIS — J02.9 SORE THROAT: ICD-10-CM

## 2018-10-25 DIAGNOSIS — I10 ESSENTIAL HYPERTENSION: Primary | ICD-10-CM

## 2018-10-25 LAB
S PYO AG THROAT QL: NEGATIVE
VALID INTERNAL CONTROL?: YES

## 2018-10-25 NOTE — PROGRESS NOTES
HISTORY OF PRESENT ILLNESS Kezia Peterson is a 48 y.o. female here for follow-up on hypertension. Patient is complaining of 2-day history of sore throat. She denies fever or shortness of breath or wheezing. Hypertension The history is provided by the patient and medical records. This is a chronic problem. The problem has been rapidly improving. Pertinent negatives include no chest pain, no orthopnea, no headaches, no peripheral edema, no dizziness, no shortness of breath and no vomiting. There are no associated agents to hypertension. Risk factors include dyslipidemia. Cholesterol Problem The history is provided by the patient and medical records. This is a chronic problem. The problem has been gradually improving. Pertinent negatives include no chest pain, no headaches and no shortness of breath. The symptoms are aggravated by eating. The symptoms are relieved by medications. Treatments tried: Pravachol. The treatment provided significant relief. Sore Throat The history is provided by the patient and medical records. This is a chronic problem. The problem has not changed since onset. There has been no fever. Pertinent negatives include no diarrhea, no vomiting, no congestion, no ear pain, no headaches and no shortness of breath. She has tried nothing for the symptoms. No Known Allergies Current Outpatient Medications on File Prior to Visit Medication Sig Dispense Refill  irbesartan (AVAPRO) 300 mg tablet TAKE 1 TABLET BY MOUTH EVERY NIGHT 90 Tab 1  
 dicyclomine (BENTYL) 20 mg tablet TAKE 1 TABLET BY MOUTH THREE TIMES DAILY AS NEEDED (Patient taking differently: TAKE 1 TABLET BY MOUTH 2-3 TIMES DAILY AS NEEDED) 90 Tab 0  
 verapamil ER (VERELAN PM) 100 mg capsule TAKE 1 CAPSULE BY MOUTH EVERY NIGHT 90 Cap 1  
 pravastatin (PRAVACHOL) 80 mg tablet Take 1 Tab by mouth daily. 90 Tab 1  
 aspirin delayed-release 81 mg tablet TAKE 2 TABLETS BY MOUTH DAILY 90 Tab 0 No current facility-administered medications on file prior to visit. Past Medical History:  
Diagnosis Date  Elevated blood pressure  Hypercholesterolemia  IBS (irritable bowel syndrome) Past Surgical History:  
Procedure Laterality Date  HX CHOLECYSTECTOMY  HX HYSTERECTOMY Family History Problem Relation Age of Onset  Hypertension Mother  Diabetes Mother  Elevated Lipids Mother  Hypertension Father  Elevated Lipids Father  Hypertension Sister  Diabetes Sister  Hypertension Brother  Hypertension Sister  Elevated Lipids Sister Social History Socioeconomic History  Marital status:  Spouse name: Not on file  Number of children: Not on file  Years of education: Not on file  Highest education level: Not on file Social Needs  Financial resource strain: Not on file  Food insecurity - worry: Not on file  Food insecurity - inability: Not on file  Transportation needs - medical: Not on file  Transportation needs - non-medical: Not on file Occupational History  Not on file Tobacco Use  Smoking status: Never Smoker  Smokeless tobacco: Never Used Substance and Sexual Activity  Alcohol use: No  
 Drug use: No  
 Sexual activity: Yes  
  Partners: Male Other Topics Concern  Not on file Social History Narrative  Not on file Review of Systems Constitutional: Negative. HENT: Positive for sore throat. Negative for congestion and ear pain. Eyes: Negative. Respiratory: Negative. Negative for shortness of breath. Cardiovascular: Negative. Negative for chest pain and orthopnea. Gastrointestinal: Negative for diarrhea and vomiting. Musculoskeletal: Negative. Neurological: Negative. Negative for dizziness and headaches. Endo/Heme/Allergies: Negative. Psychiatric/Behavioral: Negative. Visit Vitals /70 (BP 1 Location: Left arm, BP Patient Position: Sitting) Pulse (!) 59 Temp 96.2 °F (35.7 °C) (Oral) Resp 18 Ht 5' 5\" (1.651 m) Wt 189 lb (85.7 kg) SpO2 100% BMI 31.45 kg/m² Physical Exam  
Constitutional: She is oriented to person, place, and time. She appears well-developed and well-nourished. HENT:  
Head: Normocephalic and atraumatic. Cardiovascular: Normal rate, regular rhythm, normal heart sounds and intact distal pulses. Exam reveals no gallop and no friction rub. No murmur heard. Pulmonary/Chest: Effort normal and breath sounds normal. No respiratory distress. She has no wheezes. She has no rales. Musculoskeletal: Normal range of motion. She exhibits no edema, tenderness or deformity. Neurological: She is alert and oriented to person, place, and time. No cranial nerve deficit. Coordination normal.  
Skin: Skin is warm and dry. No rash noted. No erythema. No pallor. Psychiatric: She has a normal mood and affect. Her behavior is normal. Judgment and thought content normal.  
Nursing note and vitals reviewed. ASSESSMENT and PLAN 
  ICD-10-CM ICD-9-CM 1. Essential hypertension I10 401.9 2. Hyperlipidemia, unspecified hyperlipidemia type E78.5 272.4 3. Sore throat J02.9 462 RTO X 6 mo

## 2018-10-25 NOTE — PROGRESS NOTES
Juarez Ordoñez is a 48 y.o. female (: 1965) presenting to address: Chief Complaint Patient presents with  Hypertension  
  follow up  Cholesterol Problem  
  follow up  Medication Evaluation  
  new RX follow up Vitals:  
 10/25/18 1733 BP: 108/70 Pulse: (!) 59 Resp: 18 Temp: 96.2 °F (35.7 °C) TempSrc: Oral  
SpO2: 100% Weight: 189 lb (85.7 kg) Height: 5' 5\" (1.651 m) PainSc:   0 - No pain Hearing/Vision: No exam data present Learning Assessment:  
 
Learning Assessment 11/3/2015 PRIMARY LEARNER Patient PRIMARY LANGUAGE ENGLISH  
LEARNER PREFERENCE PRIMARY READING  
ANSWERED BY patient RELATIONSHIP SELF Depression Screening: PHQ over the last two weeks 10/25/2018 Little interest or pleasure in doing things Not at all Feeling down, depressed, irritable, or hopeless Not at all Total Score PHQ 2 0 Fall Risk Assessment:  
 
Fall Risk Assessment, last 12 mths 2018 Able to walk? Yes Fall in past 12 months? No  
 
Abuse Screening:  
 
Abuse Screening Questionnaire 2018 Do you ever feel afraid of your partner? Swati Fraisabelle Are you in a relationship with someone who physically or mentally threatens you? Swati Fraise Is it safe for you to go home? Vic Finley Coordination of Care Questionaire: 1. Have you been to the ER, urgent care clinic since your last visit? Hospitalized since your last visit? NO 
 
2. Have you seen or consulted any other health care providers outside of the [address] [place] [address] since your last visit? Include any pap smears or colon screening.  NO

## 2018-11-02 NOTE — TELEPHONE ENCOUNTER
Requested Prescriptions     Pending Prescriptions Disp Refills    dicyclomine (BENTYL) 20 mg tablet 90 Tab 1891 [facility] #[id number] - [address] [facility] - [phone]      They have 0 tablets remaining. Pts last appt was 10/25/18, No future appointment is scheduled. Pt aware of 72 hours time frame.

## 2018-11-03 RX ORDER — DICYCLOMINE HYDROCHLORIDE 20 MG/1
TABLET ORAL
Qty: 90 TAB | Refills: 0 | Status: SHIPPED | OUTPATIENT
Start: 2018-11-03 | End: 2018-12-28 | Stop reason: SDUPTHER

## 2018-11-03 NOTE — TELEPHONE ENCOUNTER
Pt stated she requested this at her last visit & her pharmacy has faxed 2 requests. Pt needs this now. Please call her once done.     Requested Prescriptions     Pending Prescriptions Disp Refills    dicyclomine (BENTYL) 20 mg tablet 90 Tab 0

## 2018-12-06 ENCOUNTER — PATIENT OUTREACH (OUTPATIENT)
Dept: FAMILY MEDICINE CLINIC | Age: 53
End: 2018-12-06

## 2018-12-26 ENCOUNTER — PATIENT OUTREACH (OUTPATIENT)
Dept: FAMILY MEDICINE CLINIC | Age: 53
End: 2018-12-26

## 2018-12-26 NOTE — PROGRESS NOTES
NN health screening:    I've requested mammogram report from [facility] near [facility]. Was done 12/4/18. In process of updating chart with this report. Colonoscopy and cervical screenings are already updated. Closed this episode of care.

## 2018-12-28 RX ORDER — DICYCLOMINE HYDROCHLORIDE 20 MG/1
TABLET ORAL
Qty: 90 TAB | Refills: 0 | Status: SHIPPED | OUTPATIENT
Start: 2018-12-28 | End: 2019-11-11 | Stop reason: DRUGHIGH

## 2019-02-13 RX ORDER — DICYCLOMINE HYDROCHLORIDE 20 MG/1
20 TABLET ORAL EVERY 6 HOURS
Qty: 90 TAB | Refills: 0 | Status: SHIPPED | OUTPATIENT
Start: 2019-02-13 | End: 2019-03-26 | Stop reason: SDUPTHER

## 2019-02-13 NOTE — TELEPHONE ENCOUNTER
Pt calling to request medication refill of:  Requested Prescriptions     Pending Prescriptions Disp Refills    dicyclomine (BENTYL) 20 mg tablet 90 Tab 0          be sent to [facility]/PHARMACY #[id number]- [phone] Jonathan Myers  Pt has about 0 tabs remaining. Pts last appt was 10/25/18  Advised pt of 72 hour time frame for refill requests. Please advise.

## 2019-03-01 ENCOUNTER — OFFICE VISIT (OUTPATIENT)
Dept: FAMILY MEDICINE CLINIC | Age: 54
End: 2019-03-01

## 2019-03-01 VITALS
RESPIRATION RATE: 18 BRPM | SYSTOLIC BLOOD PRESSURE: 150 MMHG | OXYGEN SATURATION: 99 % | TEMPERATURE: 98.5 F | WEIGHT: 187 LBS | DIASTOLIC BLOOD PRESSURE: 62 MMHG | BODY MASS INDEX: 31.16 KG/M2 | HEART RATE: 56 BPM | HEIGHT: 65 IN

## 2019-03-01 DIAGNOSIS — J01.10 ACUTE FRONTAL SINUSITIS, RECURRENCE NOT SPECIFIED: Primary | ICD-10-CM

## 2019-03-01 RX ORDER — FLUTICASONE PROPIONATE 50 MCG
2 SPRAY, SUSPENSION (ML) NASAL DAILY
Qty: 1 BOTTLE | Refills: 3 | Status: SHIPPED | OUTPATIENT
Start: 2019-03-01 | End: 2019-05-07

## 2019-03-01 RX ORDER — AZITHROMYCIN 250 MG/1
TABLET, FILM COATED ORAL
Qty: 6 TAB | Refills: 0 | Status: SHIPPED | OUTPATIENT
Start: 2019-03-01 | End: 2019-03-06

## 2019-03-01 NOTE — PROGRESS NOTES
Patient is here for sinus pressure and post nasal drip. 1. Have you been to the ER, urgent care clinic since your last visit? Hospitalized since your last visit?no    2. Have you seen or consulted any other health care providers outside of the [address] [place] [address] since your last visit? Include any pap smears or colon screening.  no

## 2019-03-01 NOTE — PROGRESS NOTES
HISTORY OF PRESENT ILLNESS  Dm Gilmore is a 48 y.o. female. Patient is here today as she mentions she may be having a sinus infection. This has been going on since one week and she took OTC tylenol which has not helped. Sinus Infection    The history is provided by the patient. This is a new problem. The current episode started more than 1 week ago. The problem has been gradually worsening. There has been no fever. The pain is at a severity of 4/10. The pain is mild. The pain has been constant since onset. Associated symptoms include congestion, sinus pressure, sore throat, rhinorrhea and headaches. Pertinent negatives include no chills, no sweats, no ear pain, no hoarse voice, no cough, no shortness of breath and no chest pain. She has tried acetaminophen for the symptoms. The treatment provided no relief. Review of Systems   Constitutional: Negative for chills, fever, malaise/fatigue and weight loss. HENT: Positive for congestion, rhinorrhea, sinus pressure, sinus pain and sore throat. Negative for ear pain, hearing loss, hoarse voice and tinnitus. Eyes: Negative for blurred vision, double vision, pain and discharge. Respiratory: Negative for cough, sputum production and shortness of breath. Cardiovascular: Negative for chest pain and palpitations. Gastrointestinal: Negative for abdominal pain, diarrhea, nausea and vomiting. Genitourinary: Negative for dysuria and frequency. Musculoskeletal: Negative for joint pain and myalgias. Neurological: Positive for dizziness and headaches. Psychiatric/Behavioral: Negative for depression. The patient is not nervous/anxious. Visit Vitals  /62 (BP 1 Location: Right arm, BP Patient Position: Sitting)   Pulse (!) 56   Temp 98.5 °F (36.9 °C) (Oral)   Resp 18   Ht 5' 5\" (1.651 m)   Wt 187 lb (84.8 kg)   SpO2 99%   BMI 31.12 kg/m²       Physical Exam   Constitutional: She is oriented to person, place, and time.  She appears well-developed and well-nourished. No distress. HENT:   Head: Normocephalic and atraumatic. Right Ear: External ear normal.   Left Ear: External ear normal.   Nose: Mucosal edema and rhinorrhea present. Right sinus exhibits maxillary sinus tenderness and frontal sinus tenderness. Left sinus exhibits maxillary sinus tenderness and frontal sinus tenderness. Mouth/Throat: Posterior oropharyngeal erythema present. No oropharyngeal exudate. Right ear cerumen impaction    Eyes: EOM are normal. Pupils are equal, round, and reactive to light. No scleral icterus. Neck: Normal range of motion. No thyromegaly present. Cardiovascular: Normal rate, regular rhythm and normal heart sounds. Pulmonary/Chest: Effort normal and breath sounds normal. No respiratory distress. She has no wheezes. Abdominal: Soft. Bowel sounds are normal. She exhibits no distension. There is no tenderness. Lymphadenopathy:     She has no cervical adenopathy. Neurological: She is oriented to person, place, and time. Psychiatric: She has a normal mood and affect. ASSESSMENT and PLAN  Sinusitis:  1) Ok to take tylenol / motrin to relieve pain. 2) Please finish course of antibiotics , explained side effects and patient verbalizes understanding. 3) Mucolytic's such as guaifenesin which can thin out the mucous. 4) Use nasal steroid inhaler and anti-allergy medication. 5) Can use nasal saline spray or Neti-pot. 6) Please keep a humidifier in your bedroom. 7) Patient instructions and information on diagnosis to be handed out.   Please contact your PCP office to have ears flushed out

## 2019-03-25 RX ORDER — ASPIRIN 81 MG/1
TABLET ORAL
Qty: 90 TAB | Refills: 0 | Status: CANCELLED | OUTPATIENT
Start: 2019-03-25

## 2019-03-25 NOTE — TELEPHONE ENCOUNTER
Requested Prescriptions     Pending Prescriptions Disp Refills    aspirin delayed-release 81 mg tablet 90 Tab 0    aspirin delayed-release 81 mg tablet 90 Tab 0     Patient is requesting 90 day supply please. Patient calling to request refill on:      Remaining pills:  0  Last appt: 3/1/19  Next appt: none    Pharmacy: [facility] in chart      Patient aware of 72 hour time frame.

## 2019-03-31 RX ORDER — ASPIRIN 81 MG/1
81 TABLET ORAL DAILY
Qty: 90 TAB | Refills: 0 | Status: SHIPPED | OUTPATIENT
Start: 2019-03-31 | End: 2019-04-20

## 2019-04-10 DIAGNOSIS — I10 ESSENTIAL HYPERTENSION: ICD-10-CM

## 2019-04-10 NOTE — TELEPHONE ENCOUNTER
Pt calling to request medication refill of:    Requested Prescriptions     Pending Prescriptions Disp Refills    verapamil ER (VERELAN PM) 100 mg capsule 90 Cap 0     Sig: TAKE 1 CAPSULE BY MOUTH EVERY NIGHT AT BEDTIME          be sent to [facility]/PHARMACY #[id number]- [address] Pt has about 0 tabs remaining. Pts last appt was 3/1/19  Advised pt of 72 hour time frame for refill requests. Please advise.

## 2019-04-11 RX ORDER — VERAPAMIL HYDROCHLORIDE 100 MG/1
100 CAPSULE, EXTENDED RELEASE ORAL
Qty: 30 CAP | Refills: 0 | Status: SHIPPED | OUTPATIENT
Start: 2019-04-11 | End: 2019-05-07 | Stop reason: SDUPTHER

## 2019-04-20 ENCOUNTER — OFFICE VISIT (OUTPATIENT)
Dept: FAMILY MEDICINE CLINIC | Age: 54
End: 2019-04-20

## 2019-04-20 VITALS
TEMPERATURE: 98 F | RESPIRATION RATE: 17 BRPM | BODY MASS INDEX: 31.62 KG/M2 | WEIGHT: 189.8 LBS | OXYGEN SATURATION: 96 % | HEIGHT: 65 IN | DIASTOLIC BLOOD PRESSURE: 68 MMHG | HEART RATE: 59 BPM | SYSTOLIC BLOOD PRESSURE: 132 MMHG

## 2019-04-20 DIAGNOSIS — M77.31 HEEL SPUR, RIGHT: Primary | ICD-10-CM

## 2019-04-20 RX ORDER — DICYCLOMINE HYDROCHLORIDE 20 MG/1
20 TABLET ORAL EVERY 6 HOURS
Qty: 90 TAB | Refills: 0 | Status: SHIPPED | OUTPATIENT
Start: 2019-04-20 | End: 2019-04-20

## 2019-04-20 NOTE — PROGRESS NOTES
HISTORY OF PRESENT ILLNESS  Terri Will is a 48 y.o. female here for evaluation of right heel pain. Patient has pain in the area of a calcaneal spur. .  Foot Pain   The history is provided by the patient. This is a new problem. The problem has not changed since onset. The symptoms are aggravated by walking. Nothing relieves the symptoms. She has tried nothing for the symptoms. No Known Allergies  Current Outpatient Medications on File Prior to Visit   Medication Sig Dispense Refill    verapamil ER (VERELAN PM) 100 mg capsule Take 1 Cap by mouth nightly for 180 days. TAKE 1 CAPSULE BY MOUTH EVERY NIGHT AT BEDTIME 30 Cap 0    fluticasone (FLONASE) 50 mcg/actuation nasal spray 2 Sprays by Both Nostrils route daily. (Patient taking differently: 2 Sprays by Both Nostrils route daily as needed.) 1 Bottle 3    dicyclomine (BENTYL) 20 mg tablet TAKE 1 TABLET BY MOUTH THREE TIMES DAILY AS NEEDED 90 Tab 0    irbesartan (AVAPRO) 300 mg tablet TAKE 1 TABLET BY MOUTH EVERY NIGHT 90 Tab 1    pravastatin (PRAVACHOL) 80 mg tablet Take 1 Tab by mouth daily. 90 Tab 1    aspirin delayed-release 81 mg tablet TAKE 2 TABLETS BY MOUTH DAILY 90 Tab 0    [DISCONTINUED] aspirin delayed-release 81 mg tablet Take 1 Tab by mouth daily for 180 days. 90 Tab 0     No current facility-administered medications on file prior to visit.       Past Medical History:   Diagnosis Date    Elevated blood pressure     Hypercholesterolemia     IBS (irritable bowel syndrome)      Past Surgical History:   Procedure Laterality Date    HX CHOLECYSTECTOMY      HX HYSTERECTOMY       Family History   Problem Relation Age of Onset    Hypertension Mother     Diabetes Mother     Elevated Lipids Mother     Hypertension Father    Judieth Sandra Elevated Lipids Father     Hypertension Sister     Diabetes Sister     Hypertension Brother     Hypertension Sister     Elevated Lipids Sister      Social History     Socioeconomic History    Marital status:      Spouse name: Not on file    Number of children: Not on file    Years of education: Not on file    Highest education level: Not on file   Occupational History    Not on file   Social Needs    Financial resource strain: Not on file    Food insecurity:     Worry: Not on file     Inability: Not on file    Transportation needs:     Medical: Not on file     Non-medical: Not on file   Tobacco Use    Smoking status: Never Smoker    Smokeless tobacco: Never Used   Substance and Sexual Activity    Alcohol use: No    Drug use: No    Sexual activity: Yes     Partners: Male   Lifestyle    Physical activity:     Days per week: Not on file     Minutes per session: Not on file    Stress: Not on file   Relationships    Social connections:     Talks on phone: Not on file     Gets together: Not on file     Attends Nondenominational service: Not on file     Active member of club or organization: Not on file     Attends meetings of clubs or organizations: Not on file     Relationship status: Not on file    Intimate partner violence:     Fear of current or ex partner: Not on file     Emotionally abused: Not on file     Physically abused: Not on file     Forced sexual activity: Not on file   Other Topics Concern    Not on file   Social History Narrative    Not on file       Review of Systems   Constitutional: Negative. Eyes: Negative. Respiratory: Negative. Cardiovascular: Negative. Musculoskeletal: Negative. Neurological: Negative. Endo/Heme/Allergies: Negative. Psychiatric/Behavioral: Negative. Visit Vitals  /68 (BP 1 Location: Right arm, BP Patient Position: Sitting) Comment: manual   Pulse (!) 59   Temp 98 °F (36.7 °C) (Oral)   Resp 17   Ht 5' 5\" (1.651 m)   Wt 189 lb 12.8 oz (86.1 kg)   SpO2 96%   BMI 31.58 kg/m²       Physical Exam   Constitutional: She is oriented to person, place, and time. She appears well-developed and well-nourished.    HENT:   Head: Normocephalic and atraumatic. Cardiovascular: Normal rate, regular rhythm, normal heart sounds and intact distal pulses. Exam reveals no gallop and no friction rub. No murmur heard. Pulmonary/Chest: Effort normal and breath sounds normal. No respiratory distress. She has no wheezes. She has no rales. Musculoskeletal: Normal range of motion. She exhibits no edema, tenderness or deformity. Neurological: She is alert and oriented to person, place, and time. No cranial nerve deficit. Coordination normal.   Skin: Skin is warm and dry. No rash noted. No erythema. No pallor. Psychiatric: She has a normal mood and affect. Her behavior is normal. Judgment and thought content normal.   Nursing note and vitals reviewed. ASSESSMENT and PLAN    ICD-10-CM ICD-9-CM    1. Heel spur, right M77.31 726.73 REFERRAL TO PODIATRY     Follow-up and Dispositions    · Return for keep regular scheduled appointment. Patient has been instructed to go to pharmacy to purchase a heel spur cup until seen by podiatry.

## 2019-04-20 NOTE — PROGRESS NOTES
Stafford Holter is a 48 y.o. female presents in office for c/o right heel pain. Health Maintenance Due   Topic Date Due    Pneumococcal 0-64 years (1 of 1 - PPSV23) 07/17/1973    FOOT EXAM Q1  07/17/1977    EYE EXAM RETINAL OR DILATED  07/17/1977    Shingrix Vaccine Age 50> (1 of 2) 07/17/2017    HEMOGLOBIN A1C Q6M  05/24/2018    MICROALBUMIN Q1  11/24/2018       1. Have you been to the ER, urgent care clinic since your last visit? Hospitalized since your last visit? No    2. Have you seen or consulted any other health care providers outside of the [address] [place] [address] since your last visit? Include any pap smears or colon screening.  No

## 2019-05-07 ENCOUNTER — OFFICE VISIT (OUTPATIENT)
Dept: FAMILY MEDICINE CLINIC | Age: 54
End: 2019-05-07

## 2019-05-07 VITALS
HEIGHT: 65 IN | WEIGHT: 190 LBS | HEART RATE: 60 BPM | RESPIRATION RATE: 15 BRPM | SYSTOLIC BLOOD PRESSURE: 130 MMHG | BODY MASS INDEX: 31.65 KG/M2 | DIASTOLIC BLOOD PRESSURE: 68 MMHG | OXYGEN SATURATION: 99 % | TEMPERATURE: 96.1 F

## 2019-05-07 DIAGNOSIS — E78.5 HYPERLIPIDEMIA, UNSPECIFIED HYPERLIPIDEMIA TYPE: Primary | ICD-10-CM

## 2019-05-07 DIAGNOSIS — I10 ESSENTIAL HYPERTENSION: ICD-10-CM

## 2019-05-07 RX ORDER — VERAPAMIL HYDROCHLORIDE 100 MG/1
100 CAPSULE, EXTENDED RELEASE ORAL
Qty: 90 CAP | Refills: 1 | Status: SHIPPED | OUTPATIENT
Start: 2019-05-07 | End: 2019-07-20 | Stop reason: SDUPTHER

## 2019-05-07 RX ORDER — PRAVASTATIN SODIUM 80 MG/1
80 TABLET ORAL DAILY
Qty: 90 TAB | Refills: 1 | Status: SHIPPED | OUTPATIENT
Start: 2019-05-07 | End: 2019-07-20 | Stop reason: SDUPTHER

## 2019-05-07 RX ORDER — PRAVASTATIN SODIUM 80 MG/1
80 TABLET ORAL DAILY
Qty: 90 TAB | Refills: 1 | Status: CANCELLED | OUTPATIENT
Start: 2019-05-07

## 2019-05-07 RX ORDER — IRBESARTAN 300 MG/1
TABLET ORAL
Qty: 90 TAB | Refills: 1 | Status: SHIPPED | OUTPATIENT
Start: 2019-05-07 | End: 2019-07-20

## 2019-05-07 NOTE — TELEPHONE ENCOUNTER
Pt calling to request med refill of:  Requested Prescriptions     Pending Prescriptions Disp Refills    pravastatin (PRAVACHOL) 80 mg tablet 90 Tab 1     Sig: Take 1 Tab by mouth daily. Be sent to [facility] [employer]    Pt has 0 tabs remaining     Pts last appt was 4/20/19 & next appt ty for today    Pt advised of 72 hour time frame. Please advise.

## 2019-05-07 NOTE — PROGRESS NOTES
Sherlyn Fall is a 48 y.o. female (: 1965) presenting to address:    Chief Complaint   Patient presents with    Hypertension     follow up    Cholesterol Problem     follow up       Vitals:    19 1742   BP: 130/68   Pulse: 60   Resp: 15   Temp: 96.1 °F (35.6 °C)   TempSrc: Temporal   SpO2: 99%   Weight: 190 lb (86.2 kg)   Height: 5' 5\" (1.651 m)   PainSc:   0 - No pain       Hearing/Vision:   No exam data present    Learning Assessment:     Learning Assessment 2019   PRIMARY LEARNER Patient   PRIMARY LANGUAGE ENGLISH   LEARNER PREFERENCE PRIMARY PICTURES     LISTENING     VIDEOS     DEMONSTRATION   ANSWERED BY patient   RELATIONSHIP SELF     Depression Screening:     3 most recent PHQ Screens 2019   Little interest or pleasure in doing things Not at all   Feeling down, depressed, irritable, or hopeless Not at all   Total Score PHQ 2 0     Fall Risk Assessment:     Fall Risk Assessment, last 12 mths 2019   Able to walk? Yes   Fall in past 12 months? No     Abuse Screening:     Abuse Screening Questionnaire 2019   Do you ever feel afraid of your partner? N   Are you in a relationship with someone who physically or mentally threatens you? N   Is it safe for you to go home? Y     Coordination of Care Questionaire:   1. Have you been to the ER, urgent care clinic since your last visit? Hospitalized since your last visit? NO    2. Have you seen or consulted any other health care providers outside of the [address] [place] [address] since your last visit? Include any pap smears or colon screening.  NO

## 2019-05-07 NOTE — PROGRESS NOTES
HISTORY OF PRESENT ILLNESS  Fer Parr is a 48 y.o. female follow-up on hypertension hyperlipidemia. Patient has also been told that she has impacted cerumen right greater than left. Patient also has been evaluated by neurology and diagnosed with mild cognitive impairment. Hypertension    The history is provided by the patient and medical records. This is a chronic problem. The problem has been rapidly improving. Pertinent negatives include no chest pain, no orthopnea, no peripheral edema and no dizziness. There are no associated agents to hypertension. Risk factors include dyslipidemia. Cholesterol Problem   The history is provided by the patient and medical records. This is a chronic problem. The problem has been gradually improving. Pertinent negatives include no chest pain. The symptoms are aggravated by eating. The symptoms are relieved by medications. Treatments tried: Pravachol. The treatment provided significant relief. No Known Allergies  Current Outpatient Medications on File Prior to Visit   Medication Sig Dispense Refill    dicyclomine (BENTYL) 20 mg tablet TAKE 1 TABLET BY MOUTH THREE TIMES DAILY AS NEEDED 90 Tab 0    aspirin delayed-release 81 mg tablet TAKE 2 TABLETS BY MOUTH DAILY 90 Tab 0    [DISCONTINUED] verapamil ER (VERELAN PM) 100 mg capsule Take 1 Cap by mouth nightly for 180 days. TAKE 1 CAPSULE BY MOUTH EVERY NIGHT AT BEDTIME 30 Cap 0     No current facility-administered medications on file prior to visit.       Past Medical History:   Diagnosis Date    Elevated blood pressure     Hypercholesterolemia     IBS (irritable bowel syndrome)      Past Surgical History:   Procedure Laterality Date    HX CHOLECYSTECTOMY      HX HYSTERECTOMY       Family History   Problem Relation Age of Onset    Hypertension Mother     Diabetes Mother     Elevated Lipids Mother     Hypertension Father    [facility] Elevated Lipids Father     Hypertension Sister     Diabetes Sister    [facility] Hypertension Brother     Hypertension Sister     Elevated Lipids Sister      Social History     Socioeconomic History    Marital status:      Spouse name: Not on file    Number of children: Not on file    Years of education: Not on file    Highest education level: Not on file   Occupational History    Not on file   Social Needs    Financial resource strain: Not on file    Food insecurity:     Worry: Not on file     Inability: Not on file    Transportation needs:     Medical: Not on file     Non-medical: Not on file   Tobacco Use    Smoking status: Never Smoker    Smokeless tobacco: Never Used   Substance and Sexual Activity    Alcohol use: No    Drug use: No    Sexual activity: Yes     Partners: Male   Lifestyle    Physical activity:     Days per week: Not on file     Minutes per session: Not on file    Stress: Not on file   Relationships    Social connections:     Talks on phone: Not on file     Gets together: Not on file     Attends Muslim service: Not on file     Active member of club or organization: Not on file     Attends meetings of clubs or organizations: Not on file     Relationship status: Not on file    Intimate partner violence:     Fear of current or ex partner: Not on file     Emotionally abused: Not on file     Physically abused: Not on file     Forced sexual activity: Not on file   Other Topics Concern    Not on file   Social History Narrative    Not on file         Review of Systems   Constitutional: Negative. Eyes: Negative. Respiratory: Negative. Cardiovascular: Negative. Negative for chest pain and orthopnea. Musculoskeletal: Negative. Neurological: Negative. Negative for dizziness. Endo/Heme/Allergies: Negative. Psychiatric/Behavioral: Negative.       Visit Vitals  /68 (BP 1 Location: Right arm, BP Patient Position: Sitting)   Pulse 60   Temp 96.1 °F (35.6 °C) (Temporal)   Resp 15   Ht 5' 5\" (1.651 m)   Wt 190 lb (86.2 kg)   SpO2 99% BMI 31.62 kg/m²       Physical Exam   Constitutional: She is oriented to person, place, and time. She appears well-developed and well-nourished. HENT:   Head: Normocephalic and atraumatic. Cardiovascular: Normal rate, regular rhythm, normal heart sounds and intact distal pulses. Exam reveals no gallop and no friction rub. No murmur heard. Pulmonary/Chest: Effort normal and breath sounds normal. No respiratory distress. She has no wheezes. She has no rales. Musculoskeletal: Normal range of motion. She exhibits no edema, tenderness or deformity. Neurological: She is alert and oriented to person, place, and time. No cranial nerve deficit. Coordination normal.   Skin: Skin is warm and dry. No rash noted. No erythema. No pallor. Psychiatric: She has a normal mood and affect. Her behavior is normal. Judgment and thought content normal.   Nursing note and vitals reviewed. ASSESSMENT and PLAN    ICD-10-CM ICD-9-CM    1. Hyperlipidemia, unspecified hyperlipidemia type F45.1 485.9 METABOLIC PANEL, COMPREHENSIVE      LIPID PANEL   2. Essential hypertension P36 119.5 METABOLIC PANEL, COMPREHENSIVE      URINALYSIS W/MICROSCOPIC     Follow-up and Dispositions    · Return in about 6 months (around 11/7/2019).

## 2019-07-16 RX ORDER — DICYCLOMINE HYDROCHLORIDE 20 MG/1
20 TABLET ORAL EVERY 6 HOURS
Qty: 90 TAB | Refills: 0 | Status: SHIPPED | OUTPATIENT
Start: 2019-07-16 | End: 2019-07-20 | Stop reason: SDUPTHER

## 2019-07-20 ENCOUNTER — OFFICE VISIT (OUTPATIENT)
Dept: FAMILY MEDICINE CLINIC | Age: 54
End: 2019-07-20

## 2019-07-20 VITALS
TEMPERATURE: 97 F | SYSTOLIC BLOOD PRESSURE: 118 MMHG | OXYGEN SATURATION: 99 % | RESPIRATION RATE: 17 BRPM | BODY MASS INDEX: 31.65 KG/M2 | WEIGHT: 190 LBS | HEIGHT: 65 IN | DIASTOLIC BLOOD PRESSURE: 68 MMHG | HEART RATE: 70 BPM

## 2019-07-20 DIAGNOSIS — I10 ESSENTIAL HYPERTENSION: Primary | ICD-10-CM

## 2019-07-20 DIAGNOSIS — E78.5 HYPERLIPIDEMIA, UNSPECIFIED HYPERLIPIDEMIA TYPE: ICD-10-CM

## 2019-07-20 RX ORDER — PRAVASTATIN SODIUM 80 MG/1
80 TABLET ORAL DAILY
Qty: 90 TAB | Refills: 1 | Status: SHIPPED | OUTPATIENT
Start: 2019-07-20 | End: 2019-11-11 | Stop reason: DRUGHIGH

## 2019-07-20 RX ORDER — VERAPAMIL HYDROCHLORIDE 100 MG/1
100 CAPSULE, EXTENDED RELEASE ORAL
Qty: 90 CAP | Refills: 1 | Status: SHIPPED | OUTPATIENT
Start: 2019-07-20 | End: 2019-11-11 | Stop reason: DRUGHIGH

## 2019-07-20 RX ORDER — IRBESARTAN 300 MG/1
TABLET ORAL
Qty: 90 TAB | Refills: 1 | Status: SHIPPED | OUTPATIENT
Start: 2019-07-20 | End: 2019-10-28 | Stop reason: SDUPTHER

## 2019-07-20 RX ORDER — ASPIRIN 81 MG/1
81 TABLET ORAL DAILY
Qty: 90 TAB | Refills: 0 | Status: SHIPPED | OUTPATIENT
Start: 2019-07-20 | End: 2019-11-05 | Stop reason: SDUPTHER

## 2019-07-20 RX ORDER — DICYCLOMINE HYDROCHLORIDE 20 MG/1
20 TABLET ORAL EVERY 6 HOURS
Qty: 90 TAB | Refills: 0 | Status: SHIPPED | OUTPATIENT
Start: 2019-07-20 | End: 2019-09-09 | Stop reason: SDUPTHER

## 2019-07-20 NOTE — PROGRESS NOTES
HISTORY OF PRESENT ILLNESS  Blanka Segundo is a 48 y.o. female here for follow-up on hypertension and hyperlipidemia. She is feeling well and has no complaints at this time. Hypertension    The history is provided by the patient and medical records. This is a chronic problem. The problem has been rapidly improving. Pertinent negatives include no chest pain, no orthopnea, no peripheral edema and no dizziness. There are no associated agents to hypertension. Risk factors include dyslipidemia. Cholesterol Problem   The history is provided by the patient and medical records. This is a chronic problem. The problem has been gradually improving. Pertinent negatives include no chest pain. The symptoms are aggravated by eating. The symptoms are relieved by medications. Treatments tried: Pravachol. The treatment provided significant relief. No Known Allergies  Current Outpatient Medications on File Prior to Visit   Medication Sig Dispense Refill    dicyclomine (BENTYL) 20 mg tablet TAKE 1 TAB BY MOUTH EVERY SIX (6) HOURS. 90 Tab 0    aspirin delayed-release 81 mg tablet TAKE 1 TABLET BY MOUTH EVERY DAY 90 Tab 0    pravastatin (PRAVACHOL) 80 mg tablet Take 1 Tab by mouth daily. 90 Tab 1    irbesartan (AVAPRO) 300 mg tablet TAKE 1 TABLET BY MOUTH EVERY NIGHT 90 Tab 1    verapamil ER (VERELAN PM) 100 mg capsule Take 1 Cap by mouth nightly. 90 Cap 1    dicyclomine (BENTYL) 20 mg tablet TAKE 1 TABLET BY MOUTH THREE TIMES DAILY AS NEEDED 90 Tab 0     No current facility-administered medications on file prior to visit.       Past Medical History:   Diagnosis Date    Elevated blood pressure     Hypercholesterolemia     IBS (irritable bowel syndrome)      Past Surgical History:   Procedure Laterality Date    HX CHOLECYSTECTOMY      HX HYSTERECTOMY       Family History   Problem Relation Age of Onset    Hypertension Mother     Diabetes Mother     Elevated Lipids Mother     Hypertension Father    [facility] Lipids Father     Hypertension Sister     Diabetes Sister     Hypertension Brother     Hypertension Sister     Elevated Lipids Sister      Social History     Socioeconomic History    Marital status:      Spouse name: Not on file    Number of children: Not on file    Years of education: Not on file    Highest education level: Not on file   Occupational History    Not on file   Social Needs    Financial resource strain: Not on file    Food insecurity:     Worry: Not on file     Inability: Not on file    Transportation needs:     Medical: Not on file     Non-medical: Not on file   Tobacco Use    Smoking status: Never Smoker    Smokeless tobacco: Never Used   Substance and Sexual Activity    Alcohol use: No    Drug use: No    Sexual activity: Yes     Partners: Male   Lifestyle    Physical activity:     Days per week: Not on file     Minutes per session: Not on file    Stress: Not on file   Relationships    Social connections:     Talks on phone: Not on file     Gets together: Not on file     Attends Anabaptist service: Not on file     Active member of club or organization: Not on file     Attends meetings of clubs or organizations: Not on file     Relationship status: Not on file    Intimate partner violence:     Fear of current or ex partner: Not on file     Emotionally abused: Not on file     Physically abused: Not on file     Forced sexual activity: Not on file   Other Topics Concern    Not on file   Social History Narrative    Not on file       Review of Systems   Constitutional: Negative. Eyes: Negative. Respiratory: Negative. Cardiovascular: Negative. Negative for chest pain and orthopnea. Musculoskeletal: Negative. Neurological: Negative. Negative for dizziness. Endo/Heme/Allergies: Negative. Psychiatric/Behavioral: Negative.       Visit Vitals  /68 (BP 1 Location: Left arm, BP Patient Position: Sitting)   Pulse 70   Temp 97 °F (36.1 °C) (Oral)   Resp 17 Ht 5' 5\" (1.651 m)   Wt 190 lb (86.2 kg)   SpO2 99%   BMI 31.62 kg/m²       Physical Exam   Constitutional: She is oriented to person, place, and time. She appears well-developed and well-nourished. HENT:   Head: Normocephalic and atraumatic. Cardiovascular: Normal rate, regular rhythm, normal heart sounds and intact distal pulses. Exam reveals no gallop and no friction rub. No murmur heard. Pulmonary/Chest: Effort normal and breath sounds normal. No respiratory distress. She has no wheezes. She has no rales. Musculoskeletal: Normal range of motion. She exhibits no edema, tenderness or deformity. Neurological: She is alert and oriented to person, place, and time. No cranial nerve deficit. Coordination normal.   Skin: Skin is warm and dry. No rash noted. No erythema. No pallor. Psychiatric: She has a normal mood and affect. Her behavior is normal. Judgment and thought content normal.   Nursing note and vitals reviewed. ASSESSMENT and PLAN    ICD-10-CM ICD-9-CM    1. Essential hypertension I10 401.9    2. Hyperlipidemia, unspecified hyperlipidemia type E78.5 272.4      Follow-up and Dispositions    · Return in about 6 months (around 1/20/2020).

## 2019-09-16 ENCOUNTER — HOSPITAL ENCOUNTER (OUTPATIENT)
Dept: LAB | Age: 54
Discharge: HOME | End: 2019-09-16
Payer: COMMERCIAL

## 2019-09-16 DIAGNOSIS — I10 ESSENTIAL HYPERTENSION: ICD-10-CM

## 2019-09-16 DIAGNOSIS — E78.5 HYPERLIPIDEMIA, UNSPECIFIED HYPERLIPIDEMIA TYPE: ICD-10-CM

## 2019-09-16 LAB
ALBUMIN SERPL-MCNC: 3.7 G/DL (ref 3.4–5)
ALBUMIN/GLOB SERPL: 1 {RATIO} (ref 0.8–1.7)
ALP SERPL-CCNC: 102 U/L (ref 45–117)
ALT SERPL-CCNC: 21 U/L (ref 13–56)
ANION GAP SERPL CALC-SCNC: 6 MMOL/L (ref 3–18)
APPEARANCE UR: CLEAR
AST SERPL-CCNC: 18 U/L (ref 10–38)
BACTERIA URNS QL MICRO: NEGATIVE /HPF
BILIRUB SERPL-MCNC: 0.2 MG/DL (ref 0.2–1)
BILIRUB UR QL: NEGATIVE
BUN SERPL-MCNC: 17 MG/DL (ref 7–18)
BUN/CREAT SERPL: 19 (ref 12–20)
CALCIUM SERPL-MCNC: 9.2 MG/DL (ref 8.5–10.1)
CHLORIDE SERPL-SCNC: 107 MMOL/L (ref 100–111)
CO2 SERPL-SCNC: 27 MMOL/L (ref 21–32)
COLOR UR: YELLOW
CREAT SERPL-MCNC: 0.89 MG/DL (ref 0.6–1.3)
EPITH CASTS URNS QL MICRO: NORMAL /LPF (ref 0–5)
GLOBULIN SER CALC-MCNC: 3.6 G/DL (ref 2–4)
GLUCOSE SERPL-MCNC: 92 MG/DL (ref 74–99)
GLUCOSE UR STRIP.AUTO-MCNC: NEGATIVE MG/DL
HGB UR QL STRIP: NEGATIVE
KETONES UR QL STRIP.AUTO: NEGATIVE MG/DL
LEUKOCYTE ESTERASE UR QL STRIP.AUTO: NEGATIVE
NITRITE UR QL STRIP.AUTO: NEGATIVE
PH UR STRIP: 5.5 [PH] (ref 5–8)
POTASSIUM SERPL-SCNC: 3.8 MMOL/L (ref 3.5–5.5)
PROT SERPL-MCNC: 7.3 G/DL (ref 6.4–8.2)
PROT UR STRIP-MCNC: NEGATIVE MG/DL
RBC #/AREA URNS HPF: 0 /HPF (ref 0–5)
SODIUM SERPL-SCNC: 140 MMOL/L (ref 136–145)
SP GR UR REFRACTOMETRY: 1.02 (ref 1–1.03)
UROBILINOGEN UR QL STRIP.AUTO: 0.2 EU/DL (ref 0.2–1)
WBC URNS QL MICRO: NORMAL /HPF (ref 0–4)

## 2019-09-16 PROCEDURE — 36415 COLL VENOUS BLD VENIPUNCTURE: CPT

## 2019-09-16 PROCEDURE — 80061 LIPID PANEL: CPT

## 2019-09-16 PROCEDURE — 81001 URINALYSIS AUTO W/SCOPE: CPT

## 2019-09-16 PROCEDURE — 80053 COMPREHEN METABOLIC PANEL: CPT

## 2019-09-17 LAB
CHOLEST SERPL-MCNC: 214 MG/DL
HDLC SERPL-MCNC: 65 MG/DL (ref 40–60)
HDLC SERPL: 3.3 {RATIO} (ref 0–5)
LDLC SERPL CALC-MCNC: 125.6 MG/DL (ref 0–100)
LIPID PROFILE,FLP: ABNORMAL
TRIGL SERPL-MCNC: 117 MG/DL (ref ?–150)
VLDLC SERPL CALC-MCNC: 23.4 MG/DL

## 2019-10-28 DIAGNOSIS — I10 ESSENTIAL HYPERTENSION: Primary | ICD-10-CM

## 2019-10-30 RX ORDER — IRBESARTAN 300 MG/1
TABLET ORAL
Qty: 30 TAB | Refills: 0 | Status: SHIPPED | OUTPATIENT
Start: 2019-10-30 | End: 2019-11-11 | Stop reason: DRUGHIGH

## 2019-11-05 DIAGNOSIS — G45.9 TRANSIENT CEREBRAL ISCHEMIA, UNSPECIFIED TYPE: Primary | ICD-10-CM

## 2019-11-07 RX ORDER — ASPIRIN 81 MG/1
81 TABLET ORAL DAILY
Qty: 90 TAB | Refills: 1 | Status: SHIPPED | OUTPATIENT
Start: 2019-11-07 | End: 2020-01-14 | Stop reason: SDUPTHER

## 2019-11-11 ENCOUNTER — OFFICE VISIT (OUTPATIENT)
Dept: FAMILY MEDICINE CLINIC | Age: 54
End: 2019-11-11

## 2019-11-11 VITALS
HEART RATE: 59 BPM | OXYGEN SATURATION: 98 % | BODY MASS INDEX: 32.22 KG/M2 | DIASTOLIC BLOOD PRESSURE: 62 MMHG | RESPIRATION RATE: 15 BRPM | TEMPERATURE: 96.4 F | SYSTOLIC BLOOD PRESSURE: 118 MMHG | HEIGHT: 65 IN | WEIGHT: 193.4 LBS

## 2019-11-11 DIAGNOSIS — F09 MILD COGNITIVE DISORDER: ICD-10-CM

## 2019-11-11 DIAGNOSIS — E78.5 HYPERLIPIDEMIA, UNSPECIFIED HYPERLIPIDEMIA TYPE: Primary | ICD-10-CM

## 2019-11-11 DIAGNOSIS — I10 ESSENTIAL HYPERTENSION: ICD-10-CM

## 2019-11-11 RX ORDER — VERAPAMIL HYDROCHLORIDE 100 MG/1
100 CAPSULE, EXTENDED RELEASE ORAL
Qty: 90 CAP | Refills: 1 | Status: SHIPPED | OUTPATIENT
Start: 2019-11-11 | End: 2020-01-22

## 2019-11-11 RX ORDER — DICYCLOMINE HYDROCHLORIDE 20 MG/1
20 TABLET ORAL EVERY 6 HOURS
Qty: 90 TAB | Refills: 1 | Status: SHIPPED | OUTPATIENT
Start: 2019-11-11 | End: 2021-05-04 | Stop reason: SDUPTHER

## 2019-11-11 RX ORDER — IRBESARTAN 300 MG/1
TABLET ORAL
Qty: 30 TAB | Refills: 0 | Status: SHIPPED | OUTPATIENT
Start: 2019-11-11 | End: 2020-02-02

## 2019-11-11 RX ORDER — PRAVASTATIN SODIUM 80 MG/1
80 TABLET ORAL DAILY
Qty: 90 TAB | Refills: 1 | Status: SHIPPED | OUTPATIENT
Start: 2019-11-11 | End: 2020-01-22

## 2019-11-11 NOTE — PROGRESS NOTES
Tim Randall is a 48 y.o. female (: 1965) presenting to address:    Chief Complaint   Patient presents with    Cholesterol Problem     f/u    Hypertension     f/u    Form Completion       Vitals:    19 1417   BP: 118/62   Pulse: (!) 59   Resp: 15   Temp: 96.4 °F (35.8 °C)   TempSrc: Temporal   SpO2: 98%   Weight: 193 lb 6.4 oz (87.7 kg)   Height: 5' 5\" (1.651 m)   PainSc:   0 - No pain       Hearing/Vision:   No exam data present    Learning Assessment:     Learning Assessment 2019   PRIMARY LEARNER Patient   PRIMARY LANGUAGE ENGLISH   LEARNER PREFERENCE PRIMARY PICTURES     LISTENING     VIDEOS     DEMONSTRATION     READING   ANSWERED BY patient   RELATIONSHIP SELF     Depression Screening:     3 most recent PHQ Screens 2019   Little interest or pleasure in doing things Not at all   Feeling down, depressed, irritable, or hopeless Not at all   Total Score PHQ 2 0     Fall Risk Assessment:     Fall Risk Assessment, last 12 mths 2019   Able to walk? Yes   Fall in past 12 months? No     Abuse Screening:     Abuse Screening Questionnaire 2019   Do you ever feel afraid of your partner? N   Are you in a relationship with someone who physically or mentally threatens you? N   Is it safe for you to go home? Y     Coordination of Care Questionaire:   1. Have you been to the ER, urgent care clinic since your last visit? Hospitalized since your last visit? NO    2. Have you seen or consulted any other health care providers outside of the [address] [place] [address] since your last visit? Include any pap smears or colon screening.  NO

## 2019-11-11 NOTE — PATIENT INSTRUCTIONS
Hyperlipidemia: After Your Visit  Your Care Instructions  Hyperlipidemia is too much fat in your blood. The body has several kinds of fat, including cholesterol and triglycerides. Your body needs fat for many things, such as making new cells. But too much fat in your blood increases your chances of having a heart attack or stroke. You may be able to lower your cholesterol and triglycerides with a heart-healthy diet, exercise, and if needed, medicine. Your doctor may want you to try lifestyle changes first to see whether they lower the fat in your blood. You may need to take medicine if lifestyle changes do not lower the fat in your blood enough. Follow-up care is a key part of your treatment and safety. Be sure to make and go to all appointments, and call your doctor if you are having problems. Its also a good idea to know your test results and keep a list of the medicines you take. How can you care for yourself at home? Take your medicines  · Take your medicines exactly as prescribed. Call your doctor if you think you are having a problem with your medicine. · If you take medicine to lower your cholesterol, go to follow-up visits. You will need to have blood tests. · Do not take large doses of niacin, which is a B vitamin, while taking medicine called statins. It may increase the chance of muscle pain and liver problems. · Talk to your doctor about avoiding grapefruit juice if you are taking statins. Grapefruit juice can raise the level of this medicine in your blood. This could increase side effects. Eat more fruits, vegetables, and fiber  · Fruits and vegetables have lots of nutrients that help protect against heart disease, and they have little--if any--fat. Try to eat at least five servings a day. Dark green, deep orange, or yellow fruits and vegetables are healthy choices. · Keep carrots, celery, and other veggies handy for snacks.  Buy fruit that is in season and store it where you can see it so that you will be tempted to eat it. Cook dishes that have a lot of veggies in them, such as stir-fries and soups. · Foods high in fiber may reduce your cholesterol and provide important vitamins and minerals. High-fiber foods include whole-grain cereals and breads, oatmeal, beans, brown rice, citrus fruits, and apples. · Buy whole-grain breads and cereals instead of white bread and pastries. Limit saturated fat  · Read food labels and try to avoid saturated fat and trans fat. They increase your risk of heart disease. · Use olive or canola oil when you cook. Try cholesterol-lowering spreads, such as Benecol or Take Control. · Bake, broil, grill, or steam foods instead of frying them. · Limit the amount of high-fat meats you eat, including hot dogs and sausages. Cut out all visible fat when you prepare meat. · Eat fish, skinless poultry, and soy products such as tofu instead of high-fat meats. Soybeans may be especially good for your heart. Eat at least two servings of fish a week. Certain fish, such as salmon, contain omega-3 fatty acids, which may help reduce your risk of heart attack. · Choose low-fat or fat-free milk and dairy products. Get exercise, limit alcohol, and quit smoking  · Get more exercise. Work with your doctor to set up an exercise program. Even if you can do only a small amount, exercise will help you get stronger, have more energy, and manage your weight and your stress. Walking is an easy way to get exercise. Gradually increase the amount you walk every day. Aim for at least 30 minutes on most days of the week. You also may want to swim, bike, or do other activities. · Limit alcohol to no more than 2 drinks a day for men and 1 drink a day for women. · Do not smoke. If you need help quitting, talk to your doctor about stop-smoking programs and medicines. These can increase your chances of quitting for good. When should you call for help?   Call 911 anytime you think you may need emergency care. For example, call if:  · You have symptoms of a heart attack. These may include:  ¨ Chest pain or pressure, or a strange feeling in the chest.  ¨ Sweating. ¨ Shortness of breath. ¨ Nausea or vomiting. ¨ Pain, pressure, or a strange feeling in the back, neck, jaw, or upper belly or in one or both shoulders or arms. ¨ Lightheadedness or sudden weakness. ¨ A fast or irregular heartbeat. After you call 911, the  may tell you to chew 1 adult-strength or 2 to 4 low-dose aspirin. Wait for an ambulance. Do not try to drive yourself. · You have signs of a stroke. These may include:  ¨ Sudden numbness, paralysis, or weakness in your face, arm, or leg, especially on only one side of your body. ¨ New problems with walking or balance. ¨ Sudden vision changes. ¨ Drooling or slurred speech. ¨ New problems speaking or understanding simple statements, or feeling confused. ¨ A sudden, severe headache that is different from past headaches. · You passed out (lost consciousness). Call your doctor now or seek immediate medical care if:  · You have muscle pain or weakness. Watch closely for changes in your health, and be sure to contact your doctor if:  · You are very tired. · You have an upset stomach, gas, constipation, or belly pain or cramps. Where can you learn more? Go to [employer]  Enter C406 in the search box to learn more about \"Hyperlipidemia: After Your Visit. \"   © 8193-0614 [employer]. Care instructions adapted under license by [employer] (which disclaims liability or warranty for this information). This care instruction is for use with your licensed healthcare professional. If you have questions about a medical condition or this instruction, always ask your healthcare professional. [place] [address] any warranty or liability for your use of this information.   Content Version: 0.8.884700; Last Revised: October 13, 2011                 High Cholesterol: Care Instructions  Your Care Instructions    Cholesterol is a type of fat in your blood. It is needed for many body functions, such as making new cells. Cholesterol is made by your body. It also comes from food you eat. High cholesterol means that you have too much of the fat in your blood. This raises your risk of a heart attack and stroke. LDL and HDL are part of your total cholesterol. LDL is the \"bad\" cholesterol. High LDL can raise your risk for heart disease, heart attack, and stroke. HDL is the \"good\" cholesterol. It helps clear bad cholesterol from the body. High HDL is linked with a lower risk of heart disease, heart attack, and stroke. Your cholesterol levels help your doctor find out your risk for having a heart attack or stroke. You and your doctor can talk about whether you need to lower your risk and what treatment is best for you. A heart-healthy lifestyle along with medicines can help lower your cholesterol and your risk. The way you choose to lower your risk will depend on how high your risk is for heart attack and stroke. It will also depend on how you feel about taking medicines. Follow-up care is a key part of your treatment and safety. Be sure to make and go to all appointments, and call your doctor if you are having problems. It's also a good idea to know your test results and keep a list of the medicines you take. How can you care for yourself at home? · Eat a variety of foods every day. Good choices include fruits, vegetables, whole grains (like oatmeal), dried beans and peas, nuts and seeds, soy products (like tofu), and fat-free or low-fat dairy products. · Replace butter, margarine, and hydrogenated or partially hydrogenated oils with olive and canola oils. (Canola oil margarine without trans fat is fine.)  · Replace red meat with fish, poultry, and soy protein (like tofu).   · Limit processed and packaged foods like chips, crackers, and cookies. · Bake, broil, or steam foods. Don't menon them. · Be physically active. Get at least 30 minutes of exercise on most days of the week. Walking is a good choice. You also may want to do other activities, such as running, swimming, cycling, or playing tennis or team sports. · Stay at a healthy weight or lose weight by making the changes in eating and physical activity listed above. Losing just a small amount of weight, even 5 to 10 pounds, can reduce your risk for having a heart attack or stroke. · Do not smoke. When should you call for help? Watch closely for changes in your health, and be sure to contact your doctor if:    · You need help making lifestyle changes.     · You have questions about your medicine. Where can you learn more? Go to http://marcus-carmelo.info/. Enter D075 in the search box to learn more about \"High Cholesterol: Care Instructions. \"  Current as of: April 9, 2019  Content Version: 12.2  © 7258-4754 [employer]. Care instructions adapted under license by [employer] (which disclaims liability or warranty for this information). If you have questions about a medical condition or this instruction, always ask your healthcare professional. [address] any warranty or liability for your use of this information.

## 2019-11-11 NOTE — PROGRESS NOTES
HISTORY OF PRESENT ILLNESS  Ian Forbes is a 48 y.o. female here for follow-up on mild cognitive impairment hypertension and hyperlipidemia. Patient states that she took driving tests and passed it. She has been reissued a license after passing a driving test.     .  Cholesterol Problem   The history is provided by the patient and medical records. This is a chronic problem. The problem has been gradually improving. Pertinent negatives include no chest pain. The symptoms are aggravated by eating. The symptoms are relieved by medications. Treatments tried: Pravachol. The treatment provided significant relief. Hypertension    The history is provided by the patient and medical records. This is a chronic problem. The problem has been rapidly improving. Pertinent negatives include no chest pain, no orthopnea, no peripheral edema and no dizziness. There are no associated agents to hypertension. Risk factors include dyslipidemia. Other   History provided by: Patient has been diagnosed with mild cognitive impairment. Pertinent negatives include no chest pain. No Known Allergies  Current Outpatient Medications on File Prior to Visit   Medication Sig Dispense Refill    aspirin delayed-release 81 mg tablet Take 1 Tab by mouth daily. 90 Tab 1    irbesartan (AVAPRO) 300 mg tablet TAKE 1 TABLET BY MOUTH EVERY NIGHT 30 Tab 0    dicyclomine (BENTYL) 20 mg tablet TAKE 1 TABLET BY MOUTH EVERY SIX (6) HOURS. 90 Tab 0    pravastatin (PRAVACHOL) 80 mg tablet Take 1 Tab by mouth daily. 90 Tab 1    verapamil ER (VERELAN PM) 100 mg capsule Take 1 Cap by mouth nightly. 90 Cap 1    dicyclomine (BENTYL) 20 mg tablet TAKE 1 TABLET BY MOUTH THREE TIMES DAILY AS NEEDED 90 Tab 0    [DISCONTINUED] dicyclomine (BENTYL) 20 mg tablet TAKE 1 TABLET BY MOUTH EVERY SIX (6) HOURS.  90 Tab 0    [DISCONTINUED] verapamil ER (VERELAN PM) 100 mg capsule TAKE 1 CAPSULE BY MOUTH EVERY NIGHT 30 Cap 6     No current facility-administered medications on file prior to visit.       Past Medical History:   Diagnosis Date    Elevated blood pressure     Hypercholesterolemia     IBS (irritable bowel syndrome)      Past Surgical History:   Procedure Laterality Date    HX CHOLECYSTECTOMY      HX HYSTERECTOMY       Family History   Problem Relation Age of Onset    Hypertension Mother     Diabetes Mother     Elevated Lipids Mother     Hypertension Father    Caffie Gaston Elevated Lipids Father     Hypertension Sister     Diabetes Sister     Hypertension Brother     Hypertension Sister     Elevated Lipids Sister      Social History     Socioeconomic History    Marital status:      Spouse name: Not on file    Number of children: Not on file    Years of education: Not on file    Highest education level: Not on file   Occupational History    Not on file   Social Needs    Financial resource strain: Not on file    Food insecurity:     Worry: Not on file     Inability: Not on file    Transportation needs:     Medical: Not on file     Non-medical: Not on file   Tobacco Use    Smoking status: Never Smoker    Smokeless tobacco: Never Used   Substance and Sexual Activity    Alcohol use: No    Drug use: No    Sexual activity: Yes     Partners: Male   Lifestyle    Physical activity:     Days per week: Not on file     Minutes per session: Not on file    Stress: Not on file   Relationships    Social connections:     Talks on phone: Not on file     Gets together: Not on file     Attends Muslim service: Not on file     Active member of club or organization: Not on file     Attends meetings of clubs or organizations: Not on file     Relationship status: Not on file    Intimate partner violence:     Fear of current or ex partner: Not on file     Emotionally abused: Not on file     Physically abused: Not on file     Forced sexual activity: Not on file   Other Topics Concern    Not on file   Social History Narrative    Not on file       Review of Systems   Constitutional: Negative. Eyes: Negative. Respiratory: Negative. Cardiovascular: Negative. Negative for chest pain and orthopnea. Musculoskeletal: Negative. Neurological: Negative. Negative for dizziness. Endo/Heme/Allergies: Negative. Psychiatric/Behavioral: Negative. Visit Vitals  /62 (BP 1 Location: Left arm, BP Patient Position: Sitting)   Pulse (!) 59   Temp 96.4 °F (35.8 °C) (Temporal)   Resp 15   Ht 5' 5\" (1.651 m)   Wt 193 lb 6.4 oz (87.7 kg)   SpO2 98%   BMI 32.18 kg/m²       Physical Exam   Constitutional: She is oriented to person, place, and time. She appears well-developed and well-nourished. HENT:   Head: Normocephalic and atraumatic. Cardiovascular: Normal rate, regular rhythm, normal heart sounds and intact distal pulses. Exam reveals no gallop and no friction rub. No murmur heard. Pulmonary/Chest: Effort normal and breath sounds normal. No respiratory distress. She has no wheezes. She has no rales. Musculoskeletal: Normal range of motion. She exhibits no edema, tenderness or deformity. Neurological: She is alert and oriented to person, place, and time. No cranial nerve deficit. Coordination normal.   Skin: Skin is warm and dry. No rash noted. No erythema. No pallor. Psychiatric: She has a normal mood and affect. Her behavior is normal. Judgment and thought content normal.   Nursing note and vitals reviewed. ASSESSMENT and PLAN    ICD-10-CM ICD-9-CM    1. Hyperlipidemia, unspecified hyperlipidemia type E78.5 272.4    2. Essential hypertension I10 401.9 irbesartan (AVAPRO) 300 mg tablet   3.  Mild cognitive disorder F09 294.9

## 2019-11-20 ENCOUNTER — TELEPHONE (OUTPATIENT)
Dept: INTERNAL MEDICINE CLINIC | Age: 54
End: 2019-11-20

## 2019-11-20 NOTE — TELEPHONE ENCOUNTER
Pt made aware that she will need to make appointment with her neurologist ton have DMV paperwork filled out. Lona Mayen filled her part out and will not fill out the part for a specialist. Pt says she has not seen that  In a year. I let her know she might want to give them a call to get scheduled. . I let her know with Lona Mayen beside me, that Lona Mayen will not sign the forms other than her part. Pt stated, \"thank you\" then hung up.

## 2020-01-14 ENCOUNTER — OFFICE VISIT (OUTPATIENT)
Dept: FAMILY MEDICINE CLINIC | Age: 55
End: 2020-01-14

## 2020-01-14 VITALS
DIASTOLIC BLOOD PRESSURE: 78 MMHG | OXYGEN SATURATION: 100 % | SYSTOLIC BLOOD PRESSURE: 132 MMHG | TEMPERATURE: 97.9 F | WEIGHT: 193.4 LBS | RESPIRATION RATE: 18 BRPM | HEART RATE: 57 BPM | HEIGHT: 65 IN | BODY MASS INDEX: 32.22 KG/M2

## 2020-01-14 DIAGNOSIS — E78.5 HYPERLIPIDEMIA, UNSPECIFIED HYPERLIPIDEMIA TYPE: Primary | ICD-10-CM

## 2020-01-14 DIAGNOSIS — I10 ESSENTIAL HYPERTENSION: ICD-10-CM

## 2020-01-14 RX ORDER — ASPIRIN 81 MG/1
81 TABLET ORAL DAILY
Qty: 90 TAB | Refills: 1 | Status: SHIPPED | OUTPATIENT
Start: 2020-01-14 | End: 2020-08-14 | Stop reason: SDUPTHER

## 2020-01-14 NOTE — PROGRESS NOTES
HISTORY OF PRESENT ILLNESS  Vicente Casas is a 48 y.o. female for follow-up on hypertension and hyperlipidemia. Labs have been reviewed with patient. LDL cholesterol is elevated to 125. All other labs are within normal limits. Patient is feeling well and has no complaints today. Hypertension    The history is provided by the patient and medical records. This is a chronic problem. The problem has been rapidly improving. Pertinent negatives include no chest pain, no orthopnea, no headaches, no peripheral edema, no dizziness and no shortness of breath. There are no associated agents to hypertension. Risk factors include dyslipidemia. Cholesterol Problem   The history is provided by the patient and medical records. This is a chronic problem. The problem has been gradually improving. Pertinent negatives include no chest pain, no abdominal pain, no headaches and no shortness of breath. The symptoms are aggravated by eating. The symptoms are relieved by medications. Treatments tried: Pravachol. The treatment provided significant relief. No Known Allergies  Current Outpatient Medications on File Prior to Visit   Medication Sig Dispense Refill    dicyclomine (BENTYL) 20 mg tablet TAKE 1 TABLET BY MOUTH EVERY SIX (6) HOURS. 90 Tab 0    irbesartan (AVAPRO) 300 mg tablet TAKE 1 TABLET BY MOUTH EVERY NIGHT 30 Tab 0    dicyclomine (BENTYL) 20 mg tablet Take 1 Tab by mouth every six (6) hours. 90 Tab 1    pravastatin (PRAVACHOL) 80 mg tablet Take 1 Tab by mouth daily. 90 Tab 1    verapamil ER (VERELAN PM) 100 mg capsule Take 1 Cap by mouth nightly. 90 Cap 1    [DISCONTINUED] valsartan (DIOVAN) 320 mg tablet Take 1 Tab by mouth daily. 30 Tab 5    [DISCONTINUED] aspirin delayed-release 81 mg tablet Take 1 Tab by mouth daily. 90 Tab 1     No current facility-administered medications on file prior to visit.       Past Medical History:   Diagnosis Date    Elevated blood pressure     Hypercholesterolemia     IBS (irritable bowel syndrome)      Past Surgical History:   Procedure Laterality Date    HX CHOLECYSTECTOMY      HX HYSTERECTOMY       Family History   Problem Relation Age of Onset    Hypertension Mother     Diabetes Mother     Elevated Lipids Mother     Hypertension Father    [facility] Elevated Lipids Father     Hypertension Sister     Diabetes Sister     Hypertension Brother     Hypertension Sister     Elevated Lipids Sister      Social History     Socioeconomic History    Marital status:      Spouse name: Not on file    Number of children: Not on file    Years of education: Not on file    Highest education level: Not on file   Occupational History    Not on file   Social Needs    Financial resource strain: Not on file    Food insecurity:     Worry: Not on file     Inability: Not on file    Transportation needs:     Medical: Not on file     Non-medical: Not on file   Tobacco Use    Smoking status: Never Smoker    Smokeless tobacco: Never Used   Substance and Sexual Activity    Alcohol use: No    Drug use: No    Sexual activity: Yes     Partners: Male   Lifestyle    Physical activity:     Days per week: Not on file     Minutes per session: Not on file    Stress: Not on file   Relationships    Social connections:     Talks on phone: Not on file     Gets together: Not on file     Attends Jainism service: Not on file     Active member of club or organization: Not on file     Attends meetings of clubs or organizations: Not on file     Relationship status: Not on file    Intimate partner violence:     Fear of current or ex partner: Not on file     Emotionally abused: Not on file     Physically abused: Not on file     Forced sexual activity: Not on file   Other Topics Concern    Not on file   Social History Narrative    Not on file       Review of Systems   Constitutional: Negative. Eyes: Negative. Respiratory: Negative. Negative for shortness of breath. Cardiovascular: Negative. Negative for chest pain and orthopnea. Gastrointestinal: Negative for abdominal pain. Musculoskeletal: Negative. Neurological: Negative. Negative for dizziness and headaches. Endo/Heme/Allergies: Negative. Psychiatric/Behavioral: Negative. Visit Vitals  /78 (BP 1 Location: Right arm, BP Patient Position: Sitting)   Pulse (!) 57   Temp 97.9 °F (36.6 °C) (Oral)   Resp 18   Ht 5' 5\" (1.651 m)   Wt 193 lb 6.4 oz (87.7 kg)   SpO2 100%   BMI 32.18 kg/m²       Physical Exam  Vitals signs and nursing note reviewed. Constitutional:       Appearance: She is well-developed. HENT:      Head: Normocephalic and atraumatic. Cardiovascular:      Rate and Rhythm: Normal rate and regular rhythm. Heart sounds: Normal heart sounds. No murmur. No friction rub. No gallop. Pulmonary:      Effort: Pulmonary effort is normal. No respiratory distress. Breath sounds: Normal breath sounds. No wheezing or rales. Musculoskeletal: Normal range of motion. General: No tenderness or deformity. Skin:     General: Skin is warm and dry. Coloration: Skin is not pale. Findings: No erythema or rash. Neurological:      Mental Status: She is alert and oriented to person, place, and time. Cranial Nerves: No cranial nerve deficit. Coordination: Coordination normal.   Psychiatric:         Behavior: Behavior normal.         Thought Content: Thought content normal.         Judgment: Judgment normal.         ASSESSMENT and PLAN    ICD-10-CM ICD-9-CM    1. Hyperlipidemia, unspecified hyperlipidemia type E78.5 272.4 LIPID PANEL   2. Essential hypertension I10 401.9 LIPID PANEL      METABOLIC PANEL, COMPREHENSIVE     Follow-up and Dispositions    · Return in about 6 months (around 7/14/2020).

## 2020-01-14 NOTE — PROGRESS NOTES
Joi Bar is a 48 y.o. female (: 1965) presenting to address:    Chief Complaint   Patient presents with    Hypertension    Cholesterol Problem       Vitals:    20 1651   BP: 132/78   Pulse: (!) 57   Resp: 18   Temp: 97.9 °F (36.6 °C)   TempSrc: Oral   SpO2: 100%   Weight: 193 lb 6.4 oz (87.7 kg)   Height: 5' 5\" (1.651 m)   PainSc:   0 - No pain       Hearing/Vision:   No exam data present    Learning Assessment:     Learning Assessment 2019   PRIMARY LEARNER Patient   PRIMARY LANGUAGE ENGLISH   LEARNER PREFERENCE PRIMARY PICTURES     LISTENING     VIDEOS     DEMONSTRATION     READING   ANSWERED BY patient   RELATIONSHIP SELF     Depression Screening:     3 most recent PHQ Screens 2020   Little interest or pleasure in doing things Not at all   Feeling down, depressed, irritable, or hopeless Not at all   Total Score PHQ 2 0     Fall Risk Assessment:     Fall Risk Assessment, last 12 mths 2019   Able to walk? Yes   Fall in past 12 months? No     Abuse Screening:     Abuse Screening Questionnaire 2019   Do you ever feel afraid of your partner? N   Are you in a relationship with someone who physically or mentally threatens you? N   Is it safe for you to go home? Y     Coordination of Care Questionaire:   1. Have you been to the ER, urgent care clinic since your last visit? Hospitalized since your last visit? YES  [facility]  [facility]     2. Have you seen or consulted any other health care providers outside of the [address] [place] [address] since your last visit? Include any pap smears or colon screening. NO    Advanced Directive:   1. Do you have an Advanced Directive? NO    2. Would you like information on Advanced Directives?  NO

## 2020-02-05 NOTE — TELEPHONE ENCOUNTER
Both rx sent.    Patient stated that she has been taking penicillin. Stated that on Saturday she began getting some burning/pain in her vaginal area. Patient stated that she does not notice any odor or discharge. Stated that she has been using Vaseline but that it is not working. Patient notified that I would speak with Dr. Aga Crowder and give her a return call. Patient gave verbal understanding.

## 2020-06-02 ENCOUNTER — VIRTUAL VISIT (OUTPATIENT)
Dept: FAMILY MEDICINE CLINIC | Age: 55
End: 2020-06-02

## 2020-06-02 DIAGNOSIS — I10 ESSENTIAL HYPERTENSION: Primary | ICD-10-CM

## 2020-06-02 DIAGNOSIS — F09 MILD COGNITIVE DISORDER: ICD-10-CM

## 2020-06-02 DIAGNOSIS — E78.5 HYPERLIPIDEMIA, UNSPECIFIED HYPERLIPIDEMIA TYPE: ICD-10-CM

## 2020-06-02 RX ORDER — PRAVASTATIN SODIUM 80 MG/1
TABLET ORAL
Qty: 90 TAB | Refills: 1 | Status: SHIPPED | OUTPATIENT
Start: 2020-06-02 | End: 2020-07-21 | Stop reason: SDUPTHER

## 2020-06-02 RX ORDER — VERAPAMIL HYDROCHLORIDE 100 MG/1
CAPSULE, EXTENDED RELEASE ORAL
Qty: 90 CAP | Refills: 1 | Status: SHIPPED | OUTPATIENT
Start: 2020-06-02 | End: 2020-06-16

## 2020-06-02 RX ORDER — NAPROXEN SODIUM 550 MG/1
TABLET ORAL
COMMUNITY
Start: 2020-05-16 | End: 2021-05-04

## 2020-06-02 NOTE — PROGRESS NOTES
HISTORY OF PRESENT ILLNESS  Kamini Tomlinson is a 48 y.o. female who presents today for follow-up on mild cognitive disorder hypertension and hyperlipidemia. Patient states that she is due for follow-up with neurology and is requesting a referral. .    Consent:  he and/or  healthcare decision maker is aware that this patient-initiated Telehealth encounter is a billable service, with coverage as determined by her insurance carrier. he is aware that she may receive a bill and has provided verbal consent to proceed: Yes    I was at home while conducting this encounter. Cholesterol Problem   The history is provided by the patient and medical records. This is a chronic problem. The problem has been gradually improving. The symptoms are aggravated by eating. The symptoms are relieved by medications. Treatments tried: Pravachol. The treatment provided significant relief. Hypertension    The history is provided by the patient and medical records. This is a chronic problem. The problem has been rapidly improving. Pertinent negatives include no orthopnea, no peripheral edema and no dizziness. There are no associated agents to hypertension. Risk factors include dyslipidemia. Other   The history is provided by the medical records (Patient has been diagnosed with mild cognitive impairment). This is a chronic problem. The problem has been gradually improving. No Known Allergies  Current Outpatient Medications on File Prior to Visit   Medication Sig Dispense Refill    naproxen sodium (ANAPROX) 550 mg tablet TAKE 1 TABLET BY MOUTH TWICE A DAY AS NEEDED      irbesartan (AVAPRO) 300 mg tablet TAKE 1 TABLET BY MOUTH EVERY DAY AT NIGHT 30 Tab 5    dicyclomine (BENTYL) 20 mg tablet TAKE 1 TABLET BY MOUTH EVERY SIX (6) HOURS. 90 Tab 5    aspirin delayed-release 81 mg tablet Take 1 Tab by mouth daily.  90 Tab 1    [DISCONTINUED] pravastatin (PRAVACHOL) 80 mg tablet TAKE 1 TABLET BY MOUTH EVERY DAY 90 Tab 1    [DISCONTINUED] verapamil ER (VERELAN PM) 100 mg capsule TAKE 1 CAPSULE BY MOUTH EVERY DAY AT NIGHT 90 Cap 1    dicyclomine (BENTYL) 20 mg tablet Take 1 Tab by mouth every six (6) hours. 90 Tab 1     No current facility-administered medications on file prior to visit.       Past Medical History:   Diagnosis Date    Elevated blood pressure     Hypercholesterolemia     IBS (irritable bowel syndrome)      Past Surgical History:   Procedure Laterality Date    HX CHOLECYSTECTOMY      HX HYSTERECTOMY       Family History   Problem Relation Age of Onset    Hypertension Mother     Diabetes Mother     Elevated Lipids Mother     Hypertension Father    [facility] Elevated Lipids Father     Hypertension Sister     Diabetes Sister     Hypertension Brother     Hypertension Sister     Elevated Lipids Sister      Social History     Socioeconomic History    Marital status:      Spouse name: Not on file    Number of children: Not on file    Years of education: Not on file    Highest education level: Not on file   Occupational History    Not on file   Social Needs    Financial resource strain: Not on file    Food insecurity     Worry: Not on file     Inability: Not on file    Transportation needs     Medical: Not on file     Non-medical: Not on file   Tobacco Use    Smoking status: Never Smoker    Smokeless tobacco: Never Used   Substance and Sexual Activity    Alcohol use: No    Drug use: No    Sexual activity: Yes     Partners: Male   Lifestyle    Physical activity     Days per week: Not on file     Minutes per session: Not on file    Stress: Not on file   Relationships    Social connections     Talks on phone: Not on file     Gets together: Not on file     Attends Quaker service: Not on file     Active member of club or organization: Not on file     Attends meetings of clubs or organizations: Not on file     Relationship status: Not on file    Intimate partner violence     Fear of current or ex partner: Not on file     Emotionally abused: Not on file     Physically abused: Not on file     Forced sexual activity: Not on file   Other Topics Concern    Not on file   Social History Narrative    Not on file       Review of Systems   Constitutional: Negative. Eyes: Negative. Respiratory: Negative. Cardiovascular: Negative. Negative for orthopnea. Musculoskeletal: Negative. Neurological: Negative. Negative for dizziness. Endo/Heme/Allergies: Negative. Psychiatric/Behavioral: Negative. There were no vitals taken for this visit. Physical Exam  Constitutional:       Appearance: She is well-developed. HENT:      Head: Normocephalic and atraumatic. Pulmonary:      Effort: Pulmonary effort is normal. No respiratory distress. Musculoskeletal: Normal range of motion. General: No tenderness or deformity. Skin:     General: Skin is dry. Coloration: Skin is not pale. Findings: No erythema or rash. Neurological:      Mental Status: She is alert and oriented to person, place, and time. Cranial Nerves: No cranial nerve deficit. Coordination: Coordination normal.   Psychiatric:         Behavior: Behavior normal.         Thought Content: Thought content normal.         Judgment: Judgment normal.         ASSESSMENT and PLAN    ICD-10-CM ICD-9-CM    1. Essential hypertension W40 025.2 METABOLIC PANEL, COMPREHENSIVE   2. Hyperlipidemia, unspecified hyperlipidemia type H12.3 159.0 METABOLIC PANEL, COMPREHENSIVE      LIPID PANEL   3. Mild cognitive disorder F09 294.9 LIPID PANEL   We discussed the expected course, resolution and complications of the diagnosis(es) in detail. Medication risks, benefits, costs, interactions, and alternatives were discussed as indicated. I advised her to contact the office if her condition worsens, changes or fails to improve as anticipated. She expressed understanding with the diagnosis(es) and plan.      Pursuant to the emergency declaration under the [facility] waiver authority and the [employer] Act, this Virtual  Visit was conducted, with patient's consent, to reduce the patient's risk of exposure to COVID-19 and provide continuity of care for an established patient. Services were provided through a video synchronous discussion virtually to substitute for in-person clinic visit. Abhinav Schwartz MD      Follow-up and Dispositions    · Return in about 6 months (around 12/2/2020).

## 2020-06-04 ENCOUNTER — VIRTUAL VISIT (OUTPATIENT)
Dept: FAMILY MEDICINE CLINIC | Age: 55
End: 2020-06-04

## 2020-06-04 NOTE — PROGRESS NOTES
HISTORY OF PRESENT ILLNESS  Lam Callahan is a 48 y.o. female. HPI    ROS    Physical Exam    ASSESSMENT and PLAN    ICD-10-CM ICD-9-CM    1.  ERRONEOUS ENCOUNTER--DISREGARD  [id number] patient

## 2020-06-23 ENCOUNTER — HOSPITAL ENCOUNTER (OUTPATIENT)
Dept: LAB | Age: 55
Discharge: HOME | End: 2020-06-23
Payer: COMMERCIAL

## 2020-06-23 DIAGNOSIS — I10 ESSENTIAL HYPERTENSION: ICD-10-CM

## 2020-06-23 DIAGNOSIS — F09 MILD COGNITIVE DISORDER: ICD-10-CM

## 2020-06-23 DIAGNOSIS — E78.5 HYPERLIPIDEMIA, UNSPECIFIED HYPERLIPIDEMIA TYPE: ICD-10-CM

## 2020-06-23 LAB
ALBUMIN SERPL-MCNC: 4.1 G/DL (ref 3.4–5)
ALBUMIN/GLOB SERPL: 1.1 {RATIO} (ref 0.8–1.7)
ALP SERPL-CCNC: 104 U/L (ref 45–117)
ALT SERPL-CCNC: 25 U/L (ref 13–56)
ANION GAP SERPL CALC-SCNC: 8 MMOL/L (ref 3–18)
AST SERPL-CCNC: 21 U/L (ref 10–38)
BILIRUB SERPL-MCNC: 0.4 MG/DL (ref 0.2–1)
BUN SERPL-MCNC: 17 MG/DL (ref 7–18)
BUN/CREAT SERPL: 19 (ref 12–20)
CALCIUM SERPL-MCNC: 9.5 MG/DL (ref 8.5–10.1)
CHLORIDE SERPL-SCNC: 107 MMOL/L (ref 100–111)
CHOLEST SERPL-MCNC: 209 MG/DL
CO2 SERPL-SCNC: 27 MMOL/L (ref 21–32)
CREAT SERPL-MCNC: 0.89 MG/DL (ref 0.6–1.3)
GLOBULIN SER CALC-MCNC: 3.7 G/DL (ref 2–4)
GLUCOSE SERPL-MCNC: 77 MG/DL (ref 74–99)
HDLC SERPL-MCNC: 79 MG/DL (ref 40–60)
HDLC SERPL: 2.6 {RATIO} (ref 0–5)
LDLC SERPL CALC-MCNC: 119.2 MG/DL (ref 0–100)
LIPID PROFILE,FLP: ABNORMAL
POTASSIUM SERPL-SCNC: 4.1 MMOL/L (ref 3.5–5.5)
PROT SERPL-MCNC: 7.8 G/DL (ref 6.4–8.2)
SODIUM SERPL-SCNC: 142 MMOL/L (ref 136–145)
TRIGL SERPL-MCNC: 54 MG/DL (ref ?–150)
VLDLC SERPL CALC-MCNC: 10.8 MG/DL

## 2020-06-23 PROCEDURE — 80061 LIPID PANEL: CPT

## 2020-06-23 PROCEDURE — 36415 COLL VENOUS BLD VENIPUNCTURE: CPT

## 2020-06-23 PROCEDURE — 80053 COMPREHEN METABOLIC PANEL: CPT

## 2020-07-21 ENCOUNTER — VIRTUAL VISIT (OUTPATIENT)
Dept: FAMILY MEDICINE CLINIC | Age: 55
End: 2020-07-21

## 2020-07-21 DIAGNOSIS — E78.5 HYPERLIPIDEMIA, UNSPECIFIED HYPERLIPIDEMIA TYPE: ICD-10-CM

## 2020-07-21 DIAGNOSIS — I10 ESSENTIAL HYPERTENSION: ICD-10-CM

## 2020-07-21 DIAGNOSIS — F09 MILD COGNITIVE DISORDER: Primary | ICD-10-CM

## 2020-07-21 RX ORDER — VERAPAMIL HYDROCHLORIDE 120 MG/1
120 TABLET, FILM COATED, EXTENDED RELEASE ORAL
Qty: 90 TAB | Refills: 1 | Status: SHIPPED | OUTPATIENT
Start: 2020-07-21 | End: 2020-12-02 | Stop reason: SDUPTHER

## 2020-07-21 RX ORDER — IRBESARTAN 300 MG/1
TABLET ORAL
Qty: 30 TAB | Refills: 5 | Status: SHIPPED | OUTPATIENT
Start: 2020-07-21 | End: 2020-12-02 | Stop reason: SDUPTHER

## 2020-07-21 RX ORDER — PRAVASTATIN SODIUM 80 MG/1
TABLET ORAL
Qty: 90 TAB | Refills: 1 | Status: SHIPPED | OUTPATIENT
Start: 2020-07-21 | End: 2020-12-02 | Stop reason: SDUPTHER

## 2020-07-21 NOTE — PATIENT INSTRUCTIONS
DASH Diet: Care Instructions  Your Care Instructions     The DASH diet is an eating plan that can help lower your blood pressure. DASH stands for Dietary Approaches to Stop Hypertension. Hypertension is high blood pressure. The DASH diet focuses on eating foods that are high in calcium, potassium, and magnesium. These nutrients can lower blood pressure. The foods that are highest in these nutrients are fruits, vegetables, low-fat dairy products, nuts, seeds, and legumes. But taking calcium, potassium, and magnesium supplements instead of eating foods that are high in those nutrients does not have the same effect. The DASH diet also includes whole grains, fish, and poultry. The DASH diet is one of several lifestyle changes your doctor may recommend to lower your high blood pressure. Your doctor may also want you to decrease the amount of sodium in your diet. Lowering sodium while following the DASH diet can lower blood pressure even further than just the DASH diet alone. Follow-up care is a key part of your treatment and safety. Be sure to make and go to all appointments, and call your doctor if you are having problems. It's also a good idea to know your test results and keep a list of the medicines you take. How can you care for yourself at home? Following the DASH diet  · Eat 4 to 5 servings of fruit each day. A serving is 1 medium-sized piece of fruit, ½ cup chopped or canned fruit, 1/4 cup dried fruit, or 4 ounces (½ cup) of fruit juice. Choose fruit more often than fruit juice. · Eat 4 to 5 servings of vegetables each day. A serving is 1 cup of lettuce or raw leafy vegetables, ½ cup of chopped or cooked vegetables, or 4 ounces (½ cup) of vegetable juice. Choose vegetables more often than vegetable juice. · Get 2 to 3 servings of low-fat and fat-free dairy each day. A serving is 8 ounces of milk, 1 cup of yogurt, or 1 ½ ounces of cheese. · Eat 6 to 8 servings of grains each day.  A serving is 1 slice of bread, 1 ounce of dry cereal, or ½ cup of cooked rice, pasta, or cooked cereal. Try to choose whole-grain products as much as possible. · Limit lean meat, poultry, and fish to 2 servings each day. A serving is 3 ounces, about the size of a deck of cards. · Eat 4 to 5 servings of nuts, seeds, and legumes (cooked dried beans, lentils, and split peas) each week. A serving is 1/3 cup of nuts, 2 tablespoons of seeds, or ½ cup of cooked beans or peas. · Limit fats and oils to 2 to 3 servings each day. A serving is 1 teaspoon of vegetable oil or 2 tablespoons of salad dressing. · Limit sweets and added sugars to 5 servings or less a week. A serving is 1 tablespoon jelly or jam, ½ cup sorbet, or 1 cup of lemonade. · Eat less than 2,300 milligrams (mg) of sodium a day. If you limit your sodium to 1,500 mg a day, you can lower your blood pressure even more. Tips for success  · Start small. Do not try to make dramatic changes to your diet all at once. You might feel that you are missing out on your favorite foods and then be more likely to not follow the plan. Make small changes, and stick with them. Once those changes become habit, add a few more changes. · Try some of the following:  ? Make it a goal to eat a fruit or vegetable at every meal and at snacks. This will make it easy to get the recommended amount of fruits and vegetables each day. ? Try yogurt topped with fruit and nuts for a snack or healthy dessert. ? Add lettuce, tomato, cucumber, and onion to sandwiches. ? Combine a ready-made pizza crust with low-fat mozzarella cheese and lots of vegetable toppings. Try using tomatoes, squash, spinach, broccoli, carrots, cauliflower, and onions. ? Have a variety of cut-up vegetables with a low-fat dip as an appetizer instead of chips and dip. ? Sprinkle sunflower seeds or chopped almonds over salads. Or try adding chopped walnuts or almonds to cooked vegetables.   ? Try some vegetarian meals using beans and peas. Add garbanzo or kidney beans to salads. Make burritos and tacos with mashed vasquez beans or black beans. Where can you learn more? Go to http://marcus-carmelo.info/  Enter H967 in the search box to learn more about \"DASH Diet: Care Instructions. \"  Current as of: December 16, 2019               Content Version: 12.5  © 6101-5248 [employer]. Care instructions adapted under license by [employer] (which disclaims liability or warranty for this information). If you have questions about a medical condition or this instruction, always ask your healthcare professional. [place] [address] any warranty or liability for your use of this information. High Cholesterol: Care Instructions  Your Care Instructions     Cholesterol is a type of fat in your blood. It is needed for many body functions, such as making new cells. Cholesterol is made by your body. It also comes from food you eat. High cholesterol means that you have too much of the fat in your blood. This raises your risk of a heart attack and stroke. LDL and HDL are part of your total cholesterol. LDL is the \"bad\" cholesterol. High LDL can raise your risk for heart disease, heart attack, and stroke. HDL is the \"good\" cholesterol. It helps clear bad cholesterol from the body. High HDL is linked with a lower risk of heart disease, heart attack, and stroke. Your cholesterol levels help your doctor find out your risk for having a heart attack or stroke. You and your doctor can talk about whether you need to lower your risk and what treatment is best for you. A heart-healthy lifestyle along with medicines can help lower your cholesterol and your risk. The way you choose to lower your risk will depend on how high your risk is for heart attack and stroke. It will also depend on how you feel about taking medicines. Follow-up care is a key part of your treatment and safety.  Be sure to make and go to all appointments, and call your doctor if you are having problems. It's also a good idea to know your test results and keep a list of the medicines you take. How can you care for yourself at home? · Eat a variety of foods every day. Good choices include fruits, vegetables, whole grains (like oatmeal), dried beans and peas, nuts and seeds, soy products (like tofu), and fat-free or low-fat dairy products. · Replace butter, margarine, and hydrogenated or partially hydrogenated oils with olive and canola oils. (Canola oil margarine without trans fat is fine.)  · Replace red meat with fish, poultry, and soy protein (like tofu). · Limit processed and packaged foods like chips, crackers, and cookies. · Bake, broil, or steam foods. Don't menon them. · Be physically active. Get at least 30 minutes of exercise on most days of the week. Walking is a good choice. You also may want to do other activities, such as running, swimming, cycling, or playing tennis or team sports. · Stay at a healthy weight or lose weight by making the changes in eating and physical activity listed above. Losing just a small amount of weight, even 5 to 10 pounds, can reduce your risk for having a heart attack or stroke. · Do not smoke. When should you call for help? Watch closely for changes in your health, and be sure to contact your doctor if:  · You need help making lifestyle changes. · You have questions about your medicine. Where can you learn more? Go to http://marcus-carmelo.info/  Enter R876 in the search box to learn more about \"High Cholesterol: Care Instructions. \"  Current as of: December 16, 2019               Content Version: 12.5  © 3368-8489 [employer]. Care instructions adapted under license by [employer] (which disclaims liability or warranty for this information).  If you have questions about a medical condition or this instruction, always ask your healthcare professional. [employer] disclaims any warranty or liability for your use of this information.

## 2020-07-21 NOTE — PROGRESS NOTES
HISTORY OF PRESENT ILLNESS  Yarelis Cifuentes is a 50 y.o. female who presents for follow-up on hypertension hyperlipidemia and mild cognitive impairment. Patient has to be recertified by [facility]. She has to be evaluated by neurology for this. If neurology finds her capable of driving I will sign DMV forms as well. Labs have been reviewed with patient. LDL cholesterol slightly elevated. Patient has been advised of low cholesterol diet. Consent:  he and/or  healthcare decision maker is aware that this patient-initiated Telehealth encounter is a billable service, with coverage as determined by her insurance carrier. he is aware that she may receive a bill and has provided verbal consent to proceed: Yes    I was at home while conducting this encounter. Labs   The history is provided by the medical records. This is a new problem. Pertinent negatives include no chest pain, no abdominal pain, no headaches and no shortness of breath. Hypertension    The history is provided by the patient and medical records. This is a chronic problem. The problem has been rapidly improving. Pertinent negatives include no chest pain, no orthopnea, no headaches, no peripheral edema, no dizziness and no shortness of breath. There are no associated agents to hypertension. Risk factors include dyslipidemia. Cholesterol Problem   The history is provided by the patient and medical records. This is a chronic problem. The problem has been gradually worsening. Pertinent negatives include no chest pain, no abdominal pain, no headaches and no shortness of breath. The symptoms are aggravated by eating. The symptoms are relieved by medications. Treatments tried: Pravachol. The treatment provided significant relief. Other   The history is provided by the medical records and patient (Patient has been diagnosed with mild cognitive impairment). This is a chronic problem. The current episode started more than 2 days ago.  The problem has been gradually improving. Pertinent negatives include no chest pain, no abdominal pain, no headaches and no shortness of breath. Nothing aggravates the symptoms. Nothing relieves the symptoms. She has tried nothing for the symptoms. No Known Allergies  Current Outpatient Medications on File Prior to Visit   Medication Sig Dispense Refill    dicyclomine (BENTYL) 20 mg tablet TAKE 1 TABLET BY MOUTH EVERY SIX (6) HOURS. 90 Tab 5    aspirin delayed-release 81 mg tablet Take 1 Tab by mouth daily. 90 Tab 1    dicyclomine (BENTYL) 20 mg tablet Take 1 Tab by mouth every six (6) hours. 90 Tab 1    [DISCONTINUED] verapamil ER (CALAN-SR) 120 mg tablet Take 1 Tab by mouth nightly. 90 Tab 1    naproxen sodium (ANAPROX) 550 mg tablet TAKE 1 TABLET BY MOUTH TWICE A DAY AS NEEDED      [DISCONTINUED] pravastatin (PRAVACHOL) 80 mg tablet TAKE 1 TABLET BY MOUTH EVERY DAY 90 Tab 1    [DISCONTINUED] irbesartan (AVAPRO) 300 mg tablet TAKE 1 TABLET BY MOUTH EVERY DAY AT NIGHT 30 Tab 5     No current facility-administered medications on file prior to visit.       Past Medical History:   Diagnosis Date    Elevated blood pressure     Hypercholesterolemia     IBS (irritable bowel syndrome)      Past Surgical History:   Procedure Laterality Date    HX CHOLECYSTECTOMY      HX HYSTERECTOMY       Family History   Problem Relation Age of Onset    Hypertension Mother     Diabetes Mother     Elevated Lipids Mother     Hypertension Father    Noreene Peat Elevated Lipids Father     Hypertension Sister     Diabetes Sister     Hypertension Brother     Hypertension Sister     Elevated Lipids Sister      Social History     Socioeconomic History    Marital status:      Spouse name: Not on file    Number of children: Not on file    Years of education: Not on file    Highest education level: Not on file   Occupational History    Not on file   Social Needs    Financial resource strain: Not on file    Food insecurity     Worry: Not on file     Inability: Not on file    Transportation needs     Medical: Not on file     Non-medical: Not on file   Tobacco Use    Smoking status: Never Smoker    Smokeless tobacco: Never Used   Substance and Sexual Activity    Alcohol use: No    Drug use: No    Sexual activity: Yes     Partners: Male   Lifestyle    Physical activity     Days per week: Not on file     Minutes per session: Not on file    Stress: Not on file   Relationships    Social connections     Talks on phone: Not on file     Gets together: Not on file     Attends Mosque service: Not on file     Active member of club or organization: Not on file     Attends meetings of clubs or organizations: Not on file     Relationship status: Not on file    Intimate partner violence     Fear of current or ex partner: Not on file     Emotionally abused: Not on file     Physically abused: Not on file     Forced sexual activity: Not on file   Other Topics Concern    Not on file   Social History Narrative    Not on file         Review of Systems   Constitutional: Negative. Eyes: Negative. Respiratory: Negative. Negative for shortness of breath. Cardiovascular: Negative. Negative for chest pain and orthopnea. Gastrointestinal: Negative for abdominal pain. Musculoskeletal: Negative. Neurological: Negative. Negative for dizziness and headaches. Endo/Heme/Allergies: Negative. Psychiatric/Behavioral: Negative. There were no vitals taken for this visit. Physical Exam  Nursing note reviewed. Constitutional:       Appearance: She is well-developed. HENT:      Head: Normocephalic and atraumatic. Pulmonary:      Effort: Pulmonary effort is normal. No respiratory distress. Musculoskeletal: Normal range of motion. General: No tenderness or deformity. Skin:     General: Skin is dry. Coloration: Skin is not pale. Findings: No erythema or rash.    Neurological:      Mental Status: She is alert and oriented to person, place, and time. Cranial Nerves: No cranial nerve deficit. Coordination: Coordination normal.   Psychiatric:         Behavior: Behavior normal.         Thought Content: Thought content normal.         Judgment: Judgment normal.         ASSESSMENT and PLAN    ICD-10-CM ICD-9-CM    1. Mild cognitive disorder  R03 667.4 METABOLIC PANEL, COMPREHENSIVE   2. Essential hypertension  I10 401.9 irbesartan (AVAPRO) 300 mg tablet      LIPID PANEL      METABOLIC PANEL, COMPREHENSIVE      URINALYSIS W/ RFLX MICROSCOPIC   3. Hyperlipidemia, unspecified hyperlipidemia type  E78.5 272.4 LIPID PANEL      METABOLIC PANEL, COMPREHENSIVE   We discussed the expected course, resolution and complications of the diagnosis(es) in detail. Medication risks, benefits, costs, interactions, and alternatives were discussed as indicated. I advised her to contact the office if her condition worsens, changes or fails to improve as anticipated. She expressed understanding with the diagnosis(es) and plan. Pursuant to the emergency declaration under the [facility], [facility] waiver authority and the [employer] and [employer] General Act, this Virtual  Visit was conducted, with patient's consent, to reduce the patient's risk of exposure to COVID-19 and provide continuity of care for an established patient. Services were provided through a video synchronous discussion virtually to substitute for in-person clinic visit. Michael Wallace MD      Follow-up and Dispositions    · Return in about 6 months (around 1/21/2021).

## 2020-07-21 NOTE — PROGRESS NOTES
Chief Complaint   Patient presents with    Labs       1. Have you been to the ER, urgent care clinic since your last visit? Hospitalized since your last visit? NO    2. Have you seen or consulted any other health care providers outside of the [address] [place] [address] since your last visit? Include any pap smears or colon screening.  NO

## 2020-07-28 ENCOUNTER — VIRTUAL VISIT (OUTPATIENT)
Dept: FAMILY MEDICINE CLINIC | Age: 55
End: 2020-07-28

## 2020-07-28 DIAGNOSIS — R10.32 LLQ ABDOMINAL PAIN: Primary | ICD-10-CM

## 2020-07-28 DIAGNOSIS — Z12.11 COLON CANCER SCREENING: ICD-10-CM

## 2020-07-28 NOTE — PROGRESS NOTES
Chief Complaint   Patient presents with    Abdominal Pain     x come and go x 2 days worsened left side        1. Have you been to the ER, urgent care clinic since your last visit? Hospitalized since your last visit? NO    2. Have you seen or consulted any other health care providers outside of the [address] [place] [address] since your last visit? Include any pap smears or colon screening.  NO

## 2020-07-28 NOTE — PROGRESS NOTES
[address] [place] [address]      Moses Bray is a 50 y.o. female who was seen by synchronous (real-time) audio-video technology on 7/28/2020. Consent: Moses Bray, who was seen by synchronous (real-time) audio-video technology, and/or her healthcare decision maker, is aware that this patient-initiated, Telehealth encounter on 7/28/2020 is a billable service, with coverage as determined by her insurance carrier. She is aware that she may receive a bill and has provided verbal consent to proceed: Yes. Assessment & Plan:   Diagnoses and all orders for this visit:    1. LLQ abdominal pain  -     REFERRAL TO GASTROENTEROLOGY  -     CT ABD PELV WO CONT; Future  -     ciprofloxacin HCl (Cipro) 500 mg tablet; Take 1 Tab by mouth two (2) times a day for 10 days. -     metroNIDAZOLE (FLAGYL) 500 mg tablet; Take 1 Tab by mouth three (3) times daily for 10 days. 2. Colon cancer screening  -     REFERRAL TO GASTROENTEROLOGY    obtain stat ct abd and pelvis to r/o diverticulits  Cover with cipro and flagyl  Refer to gastroenterology for further evaluation, she has a history of IBS and is due for a colonoscopy. Last was in 2015 with a 5 year follow up. recommend tylenol arthritis or aleve for pain  Counseled if she starts to have any black or bloody stools, unrelenting pain, abdominal distention to go to ED  Follow-up and Dispositions    · Return if symptoms worsen or fail to improve. 712  Subjective:   Moses Bray is a 50 y.o. female who was seen for   Abdominal Pain (x come and go x 2 days worsened left side )    Abdominal Pain  Patient complains of abdominal pain. The pain is described as sharp, and is 6/10 in intensity. Pain is located in the LLQ without radiation. Onset was 1 day ago. Symptoms have been unchanged since. Aggravating factors: movement: bending over, pressure and standing.   Alleviating factors: staying still. Associated symptoms:loose stools for the past 2 days although she thinks this is more related to her diet. The patient denies belching, constipation, fever, joint pain, melena, nausea, sweats and vomiting. PMH: IBS-managed by Dr. Ortega Erickson, takes bentyl 2-3 times a day before meals. History of intermittent diarrhea    Prior to Admission medications    Medication Sig Start Date End Date Taking? Authorizing Provider   ciprofloxacin HCl (Cipro) 500 mg tablet Take 1 Tab by mouth two (2) times a day for 10 days. 7/29/20 8/8/20 Yes Kyara Mensah NP   metroNIDAZOLE (FLAGYL) 500 mg tablet Take 1 Tab by mouth three (3) times daily for 10 days. 7/29/20 8/8/20 Yes Kyara Mensah NP   pravastatin (PRAVACHOL) 80 mg tablet TAKE 1 TABLET BY MOUTH EVERY DAY 7/21/20  Yes Paola Albrecht MD   verapamil ER (CALAN-SR) 120 mg tablet Take 1 Tab by mouth nightly. 7/21/20  Yes Paola Albrecht MD   irbesartan (AVAPRO) 300 mg tablet TAKE 1 TABLET BY MOUTH EVERY DAY AT NIGHT 7/21/20  Yes Paola Albrecht MD   dicyclomine (BENTYL) 20 mg tablet TAKE 1 TABLET BY MOUTH EVERY SIX (6) HOURS. 3/15/20  Yes Paola Albrecht MD   aspirin delayed-release 81 mg tablet Take 1 Tab by mouth daily. 1/14/20  Yes Paola Albrecht MD   dicyclomine (BENTYL) 20 mg tablet Take 1 Tab by mouth every six (6) hours.  11/11/19  Yes Paola Albrecht MD   naproxen sodium (ANAPROX) 550 mg tablet TAKE 1 TABLET BY MOUTH TWICE A DAY AS NEEDED 5/16/20   Provider, Historical     No Known Allergies    Patient Active Problem List   Diagnosis Code    Elevated blood pressure [id number]    Family history of diabetes mellitus in mother Z80.1   [facility] Noon Screening for cholesterol level Z13.220    Elevated blood sugar R73.9    Sore throat J02.9    Hyperlipidemia E78.5    Essential hypertension I10    Mild cognitive disorder F09     Patient Active Problem List    Diagnosis Date Noted    Mild cognitive disorder 06/02/2020    Sore throat 10/25/2018    Hyperlipidemia 10/25/2018    Essential hypertension 10/25/2018    Elevated blood sugar 11/07/2016    Elevated blood pressure 11/03/2015    Family history of diabetes mellitus in mother 11/03/2015    Screening for cholesterol level 11/03/2015     Current Outpatient Medications   Medication Sig Dispense Refill    ciprofloxacin HCl (Cipro) 500 mg tablet Take 1 Tab by mouth two (2) times a day for 10 days. 20 Tab 0    metroNIDAZOLE (FLAGYL) 500 mg tablet Take 1 Tab by mouth three (3) times daily for 10 days. 30 Tab 0    pravastatin (PRAVACHOL) 80 mg tablet TAKE 1 TABLET BY MOUTH EVERY DAY 90 Tab 1    verapamil ER (CALAN-SR) 120 mg tablet Take 1 Tab by mouth nightly. 90 Tab 1    irbesartan (AVAPRO) 300 mg tablet TAKE 1 TABLET BY MOUTH EVERY DAY AT NIGHT 30 Tab 5    dicyclomine (BENTYL) 20 mg tablet TAKE 1 TABLET BY MOUTH EVERY SIX (6) HOURS. 90 Tab 5    aspirin delayed-release 81 mg tablet Take 1 Tab by mouth daily. 90 Tab 1    dicyclomine (BENTYL) 20 mg tablet Take 1 Tab by mouth every six (6) hours. 90 Tab 1    naproxen sodium (ANAPROX) 550 mg tablet TAKE 1 TABLET BY MOUTH TWICE A DAY AS NEEDED       No Known Allergies  Past Medical History:   Diagnosis Date    Elevated blood pressure     Hypercholesterolemia     IBS (irritable bowel syndrome)      Past Surgical History:   Procedure Laterality Date    HX CHOLECYSTECTOMY      HX HYSTERECTOMY       Family History   Problem Relation Age of Onset    Hypertension Mother    Garcia Diabetes Mother     Elevated Lipids Mother     Hypertension Father    Garcia Elevated Lipids Father     Hypertension Sister     Diabetes Sister     Hypertension Brother     Hypertension Sister     Elevated Lipids Sister      Social History     Tobacco Use    Smoking status: Never Smoker    Smokeless tobacco: Never Used   Substance Use Topics    Alcohol use: No       ROS      Objective: There were no vitals taken for this visit.    General: alert, cooperative, no distress Mental  status: normal mood, behavior, speech, dress, motor activity, and thought processes, able to follow commands   HENT: NCAT   Neck: no visualized mass   Resp: no respiratory distress   Neuro: no gross deficits   Skin: no discoloration or lesions of concern on visible areas   Psychiatric: normal affect, consistent with stated mood, no evidence of hallucinations     Additional exam findings: We discussed the expected course, resolution and complications of the diagnosis(es) in detail. Medication risks, benefits, costs, interactions, and alternatives were discussed as indicated. I advised her to contact the office if her condition worsens, changes or fails to improve as anticipated. She expressed understanding with the diagnosis(es) and plan. Miranda Vasquez is a 50 y.o. female who was evaluated by a video visit encounter for concerns as above. Patient identification was verified prior to start of the visit. A caregiver was present when appropriate. Due to this being a TeleHealth encounter (During LDOIC-32 public health emergency), evaluation of the following organ systems was limited: Vitals/Constitutional/EENT/Resp/CV/GI//MS/Neuro/Skin/Heme-Lymph-Imm. Pursuant to the emergency declaration under the [facility], [facility] waiver authority and the [employer] and [employer] Act, this Virtual  Visit was conducted, with patient's (and/or legal guardian's) consent, to reduce the patient's risk of exposure to COVID-19 and provide necessary medical care. Services were provided through a video synchronous discussion virtually to substitute for in-person clinic visit. Patient and provider were located at their individual homes. An After Visit Summary was printed and given to the patient. All diagnosis have been discussed with the patient and all of the patient's questions have been answered.      Follow-up and Dispositions    · Return if symptoms worsen or fail to improve. Prince Huitron, [facility] [id number] [address] [place].   Rosalino Evangelista [phone]

## 2020-07-29 RX ORDER — METRONIDAZOLE 500 MG/1
500 TABLET ORAL 3 TIMES DAILY
Qty: 30 TAB | Refills: 0 | Status: SHIPPED | OUTPATIENT
Start: 2020-07-29 | End: 2020-08-08

## 2020-07-29 RX ORDER — CIPROFLOXACIN 500 MG/1
500 TABLET ORAL 2 TIMES DAILY
Qty: 20 TAB | Refills: 0 | Status: SHIPPED | OUTPATIENT
Start: 2020-07-29 | End: 2020-08-08

## 2020-07-30 ENCOUNTER — TELEPHONE (OUTPATIENT)
Dept: FAMILY MEDICINE CLINIC | Age: 55
End: 2020-07-30

## 2020-07-30 NOTE — TELEPHONE ENCOUNTER
Patient called stating that she had a virtual visit with ASHWIN Taylor on 7/28/2020 was suppose to get an order for a CT scan and wants to check on the status of it

## 2020-07-31 ENCOUNTER — TELEPHONE (OUTPATIENT)
Dept: FAMILY MEDICINE CLINIC | Age: 55
End: 2020-07-31

## 2020-08-01 NOTE — TELEPHONE ENCOUNTER
Called Mrs Iliana Shelton with the results of her CT scan  Explained to her that after consultation with Thomas Morataya it is advised she go to the ED for further evaluation as this could need surgery. The advice to go to the ED was based on the fact it is Friday and the ct scan needed to be reviewed by a surgeon which I would not have access to until Monday. She is currently on ABX, states her pain and symptom are no worse and are unchanged from initial evaluation. She verbalized understanding. I gave her my phone number in case there are questions pertaining to her case by herself or the hospital staff. All diagnosis have been discussed with the patient and all of the patient's questions have been answered.

## 2020-08-01 NOTE — TELEPHONE ENCOUNTER
Mrs Karla Russell called me to let me know she was informed surgery is not needed at this time. She was given norco for pain. I instructed her to continue with the ABX I ordered and take the [facility] [facility] as needed. cautioned her on the sedating effects of the Norco.  She verbalized understanding    All diagnosis have been discussed with the patient and all of the patient's questions have been answered.

## 2020-08-10 ENCOUNTER — TELEPHONE (OUTPATIENT)
Dept: FAMILY MEDICINE CLINIC | Age: 55
End: 2020-08-10

## 2020-08-10 DIAGNOSIS — T36.95XA ANTIBIOTIC-INDUCED YEAST INFECTION: Primary | ICD-10-CM

## 2020-08-10 DIAGNOSIS — B37.9 ANTIBIOTIC-INDUCED YEAST INFECTION: Primary | ICD-10-CM

## 2020-08-10 DIAGNOSIS — R10.32 LLQ ABDOMINAL PAIN: ICD-10-CM

## 2020-08-10 DIAGNOSIS — Z12.11 COLON CANCER SCREENING: ICD-10-CM

## 2020-08-10 NOTE — TELEPHONE ENCOUNTER
Patient called and stated she is having itching and burning from the ciprofloxacin HCl (Cipro) 500 mg tablet that was prescribed to her by LILIA Torres    Patient is requesting something to help with the side effect

## 2020-08-11 NOTE — TELEPHONE ENCOUNTER
She called back and she stated, she is having burning and itching around vaginal area But denies any odor. [phone].

## 2020-08-12 RX ORDER — FLUCONAZOLE 150 MG/1
150 TABLET ORAL DAILY
Qty: 1 TAB | Refills: 0 | Status: SHIPPED | OUTPATIENT
Start: 2020-08-12 | End: 2020-08-13

## 2020-08-12 NOTE — TELEPHONE ENCOUNTER
Returned her call  Having itching with erythema in the vaginal area. No discharge no odor. Most likely a yeast infection due to the abx. Will give dose of diflucan. Dr. Mamadou Montalvo, GI office is backed up for appt. Referral placed for DLDS per pt request    All diagnosis have been discussed with the patient and all of the patient's questions have been answered.

## 2020-08-14 RX ORDER — ASPIRIN 81 MG/1
81 TABLET ORAL DAILY
Qty: 90 TAB | Refills: 1 | Status: SHIPPED | OUTPATIENT
Start: 2020-08-14 | End: 2021-03-08

## 2020-09-10 DIAGNOSIS — R10.32 LLQ ABDOMINAL PAIN: ICD-10-CM

## 2020-09-21 ENCOUNTER — VIRTUAL VISIT (OUTPATIENT)
Dept: FAMILY MEDICINE CLINIC | Age: 55
End: 2020-09-21

## 2020-09-21 DIAGNOSIS — I10 ESSENTIAL HYPERTENSION: Primary | ICD-10-CM

## 2020-09-21 RX ORDER — ACETAMINOPHEN 325 MG/1
TABLET ORAL
COMMUNITY

## 2020-09-21 NOTE — PATIENT INSTRUCTIONS
DASH Diet: Care Instructions Your Care Instructions The DASH diet is an eating plan that can help lower your blood pressure. DASH stands for Dietary Approaches to Stop Hypertension. Hypertension is high blood pressure. The DASH diet focuses on eating foods that are high in calcium, potassium, and magnesium. These nutrients can lower blood pressure. The foods that are highest in these nutrients are fruits, vegetables, low-fat dairy products, nuts, seeds, and legumes. But taking calcium, potassium, and magnesium supplements instead of eating foods that are high in those nutrients does not have the same effect. The DASH diet also includes whole grains, fish, and poultry. The DASH diet is one of several lifestyle changes your doctor may recommend to lower your high blood pressure. Your doctor may also want you to decrease the amount of sodium in your diet. Lowering sodium while following the DASH diet can lower blood pressure even further than just the DASH diet alone. Follow-up care is a key part of your treatment and safety. Be sure to make and go to all appointments, and call your doctor if you are having problems. It's also a good idea to know your test results and keep a list of the medicines you take. How can you care for yourself at home? Following the DASH diet · Eat 4 to 5 servings of fruit each day. A serving is 1 medium-sized piece of fruit, ½ cup chopped or canned fruit, 1/4 cup dried fruit, or 4 ounces (½ cup) of fruit juice. Choose fruit more often than fruit juice. · Eat 4 to 5 servings of vegetables each day. A serving is 1 cup of lettuce or raw leafy vegetables, ½ cup of chopped or cooked vegetables, or 4 ounces (½ cup) of vegetable juice. Choose vegetables more often than vegetable juice. · Get 2 to 3 servings of low-fat and fat-free dairy each day. A serving is 8 ounces of milk, 1 cup of yogurt, or 1 ½ ounces of cheese. · Eat 6 to 8 servings of grains each day.  A serving is 1 slice of bread, 1 ounce of dry cereal, or ½ cup of cooked rice, pasta, or cooked cereal. Try to choose whole-grain products as much as possible. · Limit lean meat, poultry, and fish to 2 servings each day. A serving is 3 ounces, about the size of a deck of cards. · Eat 4 to 5 servings of nuts, seeds, and legumes (cooked dried beans, lentils, and split peas) each week. A serving is 1/3 cup of nuts, 2 tablespoons of seeds, or ½ cup of cooked beans or peas. · Limit fats and oils to 2 to 3 servings each day. A serving is 1 teaspoon of vegetable oil or 2 tablespoons of salad dressing. · Limit sweets and added sugars to 5 servings or less a week. A serving is 1 tablespoon jelly or jam, ½ cup sorbet, or 1 cup of lemonade. · Eat less than 2,300 milligrams (mg) of sodium a day. If you limit your sodium to 1,500 mg a day, you can lower your blood pressure even more. Tips for success · Start small. Do not try to make dramatic changes to your diet all at once. You might feel that you are missing out on your favorite foods and then be more likely to not follow the plan. Make small changes, and stick with them. Once those changes become habit, add a few more changes. · Try some of the following: ? Make it a goal to eat a fruit or vegetable at every meal and at snacks. This will make it easy to get the recommended amount of fruits and vegetables each day. ? Try yogurt topped with fruit and nuts for a snack or healthy dessert. ? Add lettuce, tomato, cucumber, and onion to sandwiches. ? Combine a ready-made pizza crust with low-fat mozzarella cheese and lots of vegetable toppings. Try using tomatoes, squash, spinach, broccoli, carrots, cauliflower, and onions. ? Have a variety of cut-up vegetables with a low-fat dip as an appetizer instead of chips and dip. ? Sprinkle sunflower seeds or chopped almonds over salads. Or try adding chopped walnuts or almonds to cooked vegetables.  
? Try some vegetarian meals using beans and peas. Add garbanzo or kidney beans to salads. Make burritos and tacos with mashed vasquez beans or black beans. Where can you learn more? Go to http://marcus-carmelo.info/ Enter S109 in the search box to learn more about \"DASH Diet: Care Instructions. \" Current as of: December 16, 2019               Content Version: 12.6 © 0210-4318 [employer]. Care instructions adapted under license by [employer] (which disclaims liability or warranty for this information). If you have questions about a medical condition or this instruction, always ask your healthcare professional. [place] [address] any warranty or liability for your use of this information. Low Sodium Diet (2,000 Milligram): Care Instructions Your Care Instructions Too much sodium causes your body to hold on to extra water. This can raise your blood pressure and force your heart and kidneys to work harder. In very serious cases, this could cause you to be put in the hospital. It might even be life-threatening. By limiting sodium, you will feel better and lower your risk of serious problems. The most common source of sodium is salt. People get most of the salt in their diet from canned, prepared, and packaged foods. Fast food and restaurant meals also are very high in sodium. Your doctor will probably limit your sodium to less than 2,000 milligrams (mg) a day. This limit counts all the sodium in prepared and packaged foods and any salt you add to your food. Follow-up care is a key part of your treatment and safety. Be sure to make and go to all appointments, and call your doctor if you are having problems. It's also a good idea to know your test results and keep a list of the medicines you take. How can you care for yourself at home? Read food labels · Read labels on cans and food packages. The labels tell you how much sodium is in each serving.  Make sure that you look at the serving size. If you eat more than the serving size, you have eaten more sodium. · Food labels also tell you the Percent Daily Value for sodium. Choose products with low Percent Daily Values for sodium. · Be aware that sodium can come in forms other than salt, including monosodium glutamate (MSG), sodium citrate, and sodium bicarbonate (baking soda). MSG is often added to Asian food. When you eat out, you can sometimes ask for food without MSG or added salt. Buy low-sodium foods · Buy foods that are labeled \"unsalted\" (no salt added), \"sodium-free\" (less than 5 mg of sodium per serving), or \"low-sodium\" (less than 140 mg of sodium per serving). Foods labeled \"reduced-sodium\" and \"light sodium\" may still have too much sodium. Be sure to read the label to see how much sodium you are getting. · Buy fresh vegetables, or frozen vegetables without added sauces. Buy low-sodium versions of canned vegetables, soups, and other canned goods. Prepare low-sodium meals · Cut back on the amount of salt you use in cooking. This will help you adjust to the taste. Do not add salt after cooking. One teaspoon of salt has about 2,300 mg of sodium. · Take the salt shaker off the table. · Flavor your food with garlic, lemon juice, onion, vinegar, herbs, and spices. Do not use soy sauce, lite soy sauce, steak sauce, onion salt, garlic salt, celery salt, mustard, or ketchup on your food. · Use low-sodium salad dressings, sauces, and ketchup. Or make your own salad dressings and sauces without adding salt. · Use less salt (or none) when recipes call for it. You can often use half the salt a recipe calls for without losing flavor. Other foods such as rice, pasta, and grains do not need added salt. · Rinse canned vegetables, and cook them in fresh water. This removes somebut not allof the salt. · Avoid water that is naturally high in sodium or that has been treated with water softeners, which add sodium.  Call your local water company to find out the sodium content of your water supply. If you buy bottled water, read the label and choose a sodium-free brand. Avoid high-sodium foods · Avoid eating: 
? Smoked, cured, salted, and canned meat, fish, and poultry. ? Ham, hernandez, hot dogs, and luncheon meats. ? Regular, hard, and processed cheese and regular peanut butter. ? Crackers with salted tops, and other salted snack foods such as pretzels, chips, and salted popcorn. ? Frozen prepared meals, unless labeled low-sodium. ? Canned and dried soups, broths, and bouillon, unless labeled sodium-free or low-sodium. ? Canned vegetables, unless labeled sodium-free or low-sodium. ? Western Zita fries, pizza, tacos, and other fast foods. ? Pickles, olives, ketchup, and other condiments, especially soy sauce, unless labeled sodium-free or low-sodium. Where can you learn more? Go to http://marcus-carmelo.info/ Enter Q131 in the search box to learn more about \"Low Sodium Diet (2,000 Milligram): Care Instructions. \" Current as of: August 22, 2019               Content Version: 12.6 © 7197-3449 [employer]. Care instructions adapted under license by [employer] (which disclaims liability or warranty for this information). If you have questions about a medical condition or this instruction, always ask your healthcare professional. [place] [address] any warranty or liability for your use of this information.

## 2020-09-21 NOTE — PROGRESS NOTES
HISTORY OF PRESENT ILLNESS  Magno Eaton is a 50 y.o. female who presents today because of elevated blood pressure patient states that over the past week her blood pressure has been elevated intothe 1 40-1 50 range. Her blood pressure seem to go down over the weekend into the 1 20-1 30 range. She admits to having a lot of stress at work. Consent:  he and/or  healthcare decision maker is aware that this patient-initiated Telehealth encounter is a billable service, with coverage as determined by her insurance carrier. he is aware that she may receive a bill and has provided verbal consent to proceed: Yes    I was at home while conducting this encounter. .  Hypertension    The history is provided by the patient and medical records. This is a chronic problem. The problem has been rapidly improving. Pertinent negatives include no orthopnea, no peripheral edema and no dizziness. There are no associated agents to hypertension. Risk factors include dyslipidemia. No Known Allergies  Current Outpatient Medications on File Prior to Visit   Medication Sig Dispense Refill    acetaminophen (TylenoL) 325 mg tablet Take  by mouth every six (6) hours as needed for Pain.  aspirin delayed-release 81 mg tablet Take 1 Tab by mouth daily. 90 Tab 1    pravastatin (PRAVACHOL) 80 mg tablet TAKE 1 TABLET BY MOUTH EVERY DAY 90 Tab 1    verapamil ER (CALAN-SR) 120 mg tablet Take 1 Tab by mouth nightly. 90 Tab 1    irbesartan (AVAPRO) 300 mg tablet TAKE 1 TABLET BY MOUTH EVERY DAY AT NIGHT 30 Tab 5    dicyclomine (BENTYL) 20 mg tablet TAKE 1 TABLET BY MOUTH EVERY SIX (6) HOURS. 90 Tab 5    dicyclomine (BENTYL) 20 mg tablet Take 1 Tab by mouth every six (6) hours. 90 Tab 1    naproxen sodium (ANAPROX) 550 mg tablet TAKE 1 TABLET BY MOUTH TWICE A DAY AS NEEDED       No current facility-administered medications on file prior to visit.       Past Medical History:   Diagnosis Date    Elevated blood pressure     Hypercholesterolemia     IBS (irritable bowel syndrome)      Past Surgical History:   Procedure Laterality Date    HX CHOLECYSTECTOMY      HX HYSTERECTOMY       Family History   Problem Relation Age of Onset    Hypertension Mother     Diabetes Mother     Elevated Lipids Mother     Hypertension Father    [facility] Elevated Lipids Father     Hypertension Sister     Diabetes Sister     Hypertension Brother     Hypertension Sister     Elevated Lipids Sister      Social History     Socioeconomic History    Marital status:      Spouse name: Not on file    Number of children: Not on file    Years of education: Not on file    Highest education level: Not on file   Occupational History    Not on file   Social Needs    Financial resource strain: Not on file    Food insecurity     Worry: Not on file     Inability: Not on file    Transportation needs     Medical: Not on file     Non-medical: Not on file   Tobacco Use    Smoking status: Never Smoker    Smokeless tobacco: Never Used   Substance and Sexual Activity    Alcohol use: No    Drug use: No    Sexual activity: Yes     Partners: Male   Lifestyle    Physical activity     Days per week: Not on file     Minutes per session: Not on file    Stress: Not on file   Relationships    Social connections     Talks on phone: Not on file     Gets together: Not on file     Attends Hinduism service: Not on file     Active member of club or organization: Not on file     Attends meetings of clubs or organizations: Not on file     Relationship status: Not on file    Intimate partner violence     Fear of current or ex partner: Not on file     Emotionally abused: Not on file     Physically abused: Not on file     Forced sexual activity: Not on file   Other Topics Concern    Not on file   Social History Narrative    Not on file       Review of Systems   Constitutional: Negative. Eyes: Negative. Respiratory: Negative. Cardiovascular: Negative.   Negative for orthopnea. Musculoskeletal: Negative. Neurological: Negative. Negative for dizziness. Endo/Heme/Allergies: Negative. Psychiatric/Behavioral: Negative. There were no vitals taken for this visit. Physical Exam  Vitals signs and nursing note reviewed. Constitutional:       Appearance: She is well-developed. HENT:      Head: Normocephalic and atraumatic. Cardiovascular:      Rate and Rhythm: Normal rate and regular rhythm. Heart sounds: Normal heart sounds. No murmur. No friction rub. No gallop. Pulmonary:      Effort: Pulmonary effort is normal. No respiratory distress. Breath sounds: Normal breath sounds. No wheezing or rales. Musculoskeletal: Normal range of motion. General: No tenderness or deformity. Skin:     General: Skin is warm and dry. Coloration: Skin is not pale. Findings: No erythema or rash. Neurological:      Mental Status: She is alert and oriented to person, place, and time. Cranial Nerves: No cranial nerve deficit. Coordination: Coordination normal.   Psychiatric:         Behavior: Behavior normal.         Thought Content: Thought content normal.         Judgment: Judgment normal.         ASSESSMENT and PLAN    ICD-10-CM ICD-9-CM    1. Essential hypertension  I10 401.9      We discussed the expected course, resolution and complications of the diagnosis(es) in detail. Medication risks, benefits, costs, interactions, and alternatives were discussed as indicated. I advised her to contact the office if her condition worsens, changes or fails to improve as anticipated. She expressed understanding with the diagnosis(es) and plan.      Pursuant to the emergency declaration under the [facility], [address] [place] [address] and the [employer] Act, this Virtual  Visit was conducted, with patient's consent, to reduce the patient's risk of exposure to COVID-19 and provide continuity of care for an established patient. Services were provided through a video synchronous discussion virtually to substitute for in-person clinic visit. Jose Juan De La Vega MD    Patient has been instructed to check her blood pressure daily and chart. I suspect that her blood pressure readings are elevated during the week because of increased stress and normalizes on the weekend. Follow-up and Dispositions    · Return in about 4 weeks (around 10/19/2020).

## 2020-09-21 NOTE — PROGRESS NOTES
Chief Complaint   Patient presents with    Elevated Blood Pressure     1. Have you been to the ER, urgent care clinic since your last visit? Hospitalized since your last visit? No    2. Have you seen or consulted any other health care providers outside of the [address] [place] [address] since your last visit? Include any pap smears or colon screening.  No     Health Maintenance Due   Topic    Shingrix Vaccine Age 50> (1 of 2)    PAP AKA CERVICAL CYTOLOGY     Colonoscopy     Flu Vaccine (1)         BP readings:     9/16 5pm  162/90    9/17 6pm  147-79    9/18  7:10pm  147/93     9/19   130/92 8am  125/87 noon    9/20  137/85  5pm

## 2020-10-05 RX ORDER — DICYCLOMINE HYDROCHLORIDE 20 MG/1
TABLET ORAL
Qty: 90 TAB | Refills: 5 | Status: SHIPPED | OUTPATIENT
Start: 2020-10-05 | End: 2021-05-04 | Stop reason: SDUPTHER

## 2020-12-02 ENCOUNTER — VIRTUAL VISIT (OUTPATIENT)
Dept: FAMILY MEDICINE CLINIC | Age: 55
End: 2020-12-02
Payer: COMMERCIAL

## 2020-12-02 DIAGNOSIS — E78.5 HYPERLIPIDEMIA, UNSPECIFIED HYPERLIPIDEMIA TYPE: Primary | ICD-10-CM

## 2020-12-02 DIAGNOSIS — I10 ESSENTIAL HYPERTENSION: ICD-10-CM

## 2020-12-02 PROCEDURE — 99213 OFFICE O/P EST LOW 20 MIN: CPT | Performed by: INTERNAL MEDICINE

## 2020-12-02 RX ORDER — PRAVASTATIN SODIUM 80 MG/1
TABLET ORAL
Qty: 90 TAB | Refills: 1 | Status: SHIPPED | OUTPATIENT
Start: 2020-12-02 | End: 2021-01-08

## 2020-12-02 RX ORDER — VERAPAMIL HYDROCHLORIDE 120 MG/1
120 TABLET, FILM COATED, EXTENDED RELEASE ORAL
Qty: 90 TAB | Refills: 1 | Status: SHIPPED | OUTPATIENT
Start: 2020-12-02 | End: 2021-07-22 | Stop reason: SDUPTHER

## 2020-12-02 RX ORDER — IRBESARTAN 300 MG/1
TABLET ORAL
Qty: 30 TAB | Refills: 5 | Status: SHIPPED | OUTPATIENT
Start: 2020-12-02 | End: 2021-05-10 | Stop reason: SDUPTHER

## 2020-12-02 NOTE — PROGRESS NOTES
HISTORY OF PRESENT ILLNESS  Juan Singleton is a 50 y.o. female who presents today for follow-up on hyperlipidemia and hypertension. Patient states that her blood pressures have now normalized. She states that she believes that they were elevated for a few days because of family stress. Consent:  he and/or  healthcare decision maker is aware that this patient-initiated Telehealth encounter is a billable service, with coverage as determined by her insurance carrier. he is aware that she may receive a bill and has provided verbal consent to proceed: Yes    I was at home while conducting this encounter. She was not in my office during this encounter. Hypertension    The history is provided by the patient and medical records. This is a chronic problem. The problem has been rapidly improving. Pertinent negatives include no chest pain, no orthopnea, no headaches, no peripheral edema, no dizziness and no shortness of breath. There are no associated agents to hypertension. Risk factors include dyslipidemia. Cholesterol Problem   The history is provided by the patient and medical records. This is a chronic problem. The problem has been gradually worsening. Pertinent negatives include no chest pain, no abdominal pain, no headaches and no shortness of breath. The symptoms are aggravated by eating. The symptoms are relieved by medications. Treatments tried: Pravachol. The treatment provided significant relief. No Known Allergies  Current Outpatient Medications on File Prior to Visit   Medication Sig Dispense Refill    dicyclomine (BENTYL) 20 mg tablet TAKE 1 TABLET BY MOUTH EVERY SIX (6) HOURS. 90 Tab 5    acetaminophen (TylenoL) 325 mg tablet Take  by mouth every six (6) hours as needed for Pain.  aspirin delayed-release 81 mg tablet Take 1 Tab by mouth daily.  90 Tab 1    naproxen sodium (ANAPROX) 550 mg tablet TAKE 1 TABLET BY MOUTH TWICE A DAY AS NEEDED      dicyclomine (BENTYL) 20 mg tablet Take 1 Tab by mouth every six (6) hours. 90 Tab 1    [DISCONTINUED] pravastatin (PRAVACHOL) 80 mg tablet TAKE 1 TABLET BY MOUTH EVERY DAY 90 Tab 1    [DISCONTINUED] verapamil ER (CALAN-SR) 120 mg tablet Take 1 Tab by mouth nightly. 90 Tab 1    [DISCONTINUED] irbesartan (AVAPRO) 300 mg tablet TAKE 1 TABLET BY MOUTH EVERY DAY AT NIGHT 30 Tab 5     No current facility-administered medications on file prior to visit.       Past Medical History:   Diagnosis Date    Elevated blood pressure     Hypercholesterolemia     IBS (irritable bowel syndrome)      Past Surgical History:   Procedure Laterality Date    HX CHOLECYSTECTOMY      HX HYSTERECTOMY       Family History   Problem Relation Age of Onset    Hypertension Mother     Diabetes Mother     Elevated Lipids Mother     Hypertension Father    [facility] Elevated Lipids Father     Hypertension Sister     Diabetes Sister     Hypertension Brother     Hypertension Sister     Elevated Lipids Sister      Social History     Socioeconomic History    Marital status:      Spouse name: Not on file    Number of children: Not on file    Years of education: Not on file    Highest education level: Not on file   Occupational History    Not on file   Social Needs    Financial resource strain: Not on file    Food insecurity     Worry: Not on file     Inability: Not on file    Transportation needs     Medical: Not on file     Non-medical: Not on file   Tobacco Use    Smoking status: Never Smoker    Smokeless tobacco: Never Used   Substance and Sexual Activity    Alcohol use: No    Drug use: No    Sexual activity: Yes     Partners: Male   Lifestyle    Physical activity     Days per week: Not on file     Minutes per session: Not on file    Stress: Not on file   Relationships    Social connections     Talks on phone: Not on file     Gets together: Not on file     Attends Catholic service: Not on file     Active member of club or organization: Not on file     Attends meetings of clubs or organizations: Not on file     Relationship status: Not on file    Intimate partner violence     Fear of current or ex partner: Not on file     Emotionally abused: Not on file     Physically abused: Not on file     Forced sexual activity: Not on file   Other Topics Concern    Not on file   Social History Narrative    Not on file         Review of Systems   Constitutional: Negative. Eyes: Negative. Respiratory: Negative. Negative for shortness of breath. Cardiovascular: Negative. Negative for chest pain and orthopnea. Gastrointestinal: Negative for abdominal pain. Musculoskeletal: Negative. Neurological: Negative. Negative for dizziness and headaches. Endo/Heme/Allergies: Negative. Psychiatric/Behavioral: Negative. There were no vitals taken for this visit. Physical Exam  Nursing note reviewed. Constitutional:       Appearance: She is well-developed. HENT:      Head: Normocephalic and atraumatic. Pulmonary:      Effort: Pulmonary effort is normal. No respiratory distress. Musculoskeletal: Normal range of motion. General: No tenderness or deformity. Skin:     General: Skin is dry. Coloration: Skin is not pale. Findings: No erythema or rash. Neurological:      Mental Status: She is alert and oriented to person, place, and time. Cranial Nerves: No cranial nerve deficit. Coordination: Coordination normal.   Psychiatric:         Behavior: Behavior normal.         Thought Content: Thought content normal.         Judgment: Judgment normal.         ASSESSMENT and PLAN    ICD-10-CM ICD-9-CM    1. Hyperlipidemia, unspecified hyperlipidemia type  E78.5 272.4    2. Essential hypertension  I10 401.9 irbesartan (AVAPRO) 300 mg tablet   We discussed the expected course, resolution and complications of the diagnosis(es) in detail. Medication risks, benefits, costs, interactions, and alternatives were discussed as indicated.   I advised her to contact the office if her condition worsens, changes or fails to improve as anticipated. She expressed understanding with the diagnosis(es) and plan. Pursuant to the emergency declaration under the [facility], [facility] waiver authority and the [employer] and [employer] Act, this Virtual  Visit was conducted, with patient's consent, to reduce the patient's risk of exposure to COVID-19 and provide continuity of care for an established patient. Services were provided through a video synchronous discussion virtually to substitute for in-person clinic visit. Kami Crouch MD      Follow-up and Dispositions    · Return in about 6 months (around 6/2/2021).

## 2020-12-02 NOTE — PROGRESS NOTES
Chief Complaint   Patient presents with    Hypertension     scheduled for lab friday     Weight Management     wants to lose weight          1. Have you been to the ER, urgent care clinic since your last visit? Hospitalized since your last visit? No    2. Have you seen or consulted any other health care providers outside of the [address] [place] [address] since your last visit? Include any pap smears or colon screening.  No

## 2020-12-04 ENCOUNTER — HOSPITAL ENCOUNTER (OUTPATIENT)
Dept: LAB | Age: 55
Discharge: HOME | End: 2020-12-04
Payer: COMMERCIAL

## 2020-12-04 ENCOUNTER — APPOINTMENT (OUTPATIENT)
Dept: FAMILY MEDICINE CLINIC | Age: 55
End: 2020-12-04

## 2020-12-04 DIAGNOSIS — I10 ESSENTIAL HYPERTENSION: ICD-10-CM

## 2020-12-04 DIAGNOSIS — F09 MILD COGNITIVE DISORDER: ICD-10-CM

## 2020-12-04 DIAGNOSIS — E78.5 HYPERLIPIDEMIA, UNSPECIFIED HYPERLIPIDEMIA TYPE: ICD-10-CM

## 2020-12-04 LAB
ALBUMIN SERPL-MCNC: 3.8 G/DL (ref 3.4–5)
ALBUMIN/GLOB SERPL: 1 {RATIO} (ref 0.8–1.7)
ALP SERPL-CCNC: 110 U/L (ref 45–117)
ALT SERPL-CCNC: 20 U/L (ref 13–56)
ANION GAP SERPL CALC-SCNC: 7 MMOL/L (ref 3–18)
APPEARANCE UR: CLEAR
AST SERPL-CCNC: 17 U/L (ref 10–38)
BILIRUB SERPL-MCNC: 0.4 MG/DL (ref 0.2–1)
BILIRUB UR QL: NEGATIVE
BUN SERPL-MCNC: 16 MG/DL (ref 7–18)
BUN/CREAT SERPL: 19 (ref 12–20)
CALCIUM SERPL-MCNC: 9.2 MG/DL (ref 8.5–10.1)
CHLORIDE SERPL-SCNC: 107 MMOL/L (ref 100–111)
CHOLEST SERPL-MCNC: 214 MG/DL
CO2 SERPL-SCNC: 29 MMOL/L (ref 21–32)
COLOR UR: YELLOW
CREAT SERPL-MCNC: 0.84 MG/DL (ref 0.6–1.3)
GLOBULIN SER CALC-MCNC: 3.9 G/DL (ref 2–4)
GLUCOSE SERPL-MCNC: 81 MG/DL (ref 74–99)
GLUCOSE UR STRIP.AUTO-MCNC: NEGATIVE MG/DL
HDLC SERPL-MCNC: 76 MG/DL (ref 40–60)
HDLC SERPL: 2.8 {RATIO} (ref 0–5)
HGB UR QL STRIP: NEGATIVE
KETONES UR QL STRIP.AUTO: NEGATIVE MG/DL
LDLC SERPL CALC-MCNC: 125.6 MG/DL (ref 0–100)
LEUKOCYTE ESTERASE UR QL STRIP.AUTO: NEGATIVE
LIPID PROFILE,FLP: ABNORMAL
NITRITE UR QL STRIP.AUTO: NEGATIVE
PH UR STRIP: 6.5 [PH] (ref 5–8)
POTASSIUM SERPL-SCNC: 3.7 MMOL/L (ref 3.5–5.5)
PROT SERPL-MCNC: 7.7 G/DL (ref 6.4–8.2)
PROT UR STRIP-MCNC: NEGATIVE MG/DL
SODIUM SERPL-SCNC: 143 MMOL/L (ref 136–145)
SP GR UR REFRACTOMETRY: 1.02 (ref 1–1.03)
TRIGL SERPL-MCNC: 62 MG/DL (ref ?–150)
UROBILINOGEN UR QL STRIP.AUTO: 0.2 EU/DL (ref 0.2–1)
VLDLC SERPL CALC-MCNC: 12.4 MG/DL

## 2020-12-04 PROCEDURE — 80061 LIPID PANEL: CPT

## 2020-12-04 PROCEDURE — 81003 URINALYSIS AUTO W/O SCOPE: CPT

## 2020-12-04 PROCEDURE — 80053 COMPREHEN METABOLIC PANEL: CPT

## 2021-01-08 RX ORDER — PRAVASTATIN SODIUM 80 MG/1
TABLET ORAL
Qty: 90 TAB | Refills: 1 | Status: SHIPPED | OUTPATIENT
Start: 2021-01-08 | End: 2021-05-04 | Stop reason: SDUPTHER

## 2021-03-08 RX ORDER — ASPIRIN 81 MG/1
TABLET ORAL
Qty: 90 TAB | Refills: 1 | Status: SHIPPED | OUTPATIENT
Start: 2021-03-08 | End: 2021-07-22 | Stop reason: SDUPTHER

## 2021-05-04 ENCOUNTER — VIRTUAL VISIT (OUTPATIENT)
Dept: FAMILY MEDICINE CLINIC | Age: 56
End: 2021-05-04
Payer: COMMERCIAL

## 2021-05-04 DIAGNOSIS — I10 ESSENTIAL HYPERTENSION: Primary | ICD-10-CM

## 2021-05-04 DIAGNOSIS — E78.5 HYPERLIPIDEMIA, UNSPECIFIED HYPERLIPIDEMIA TYPE: ICD-10-CM

## 2021-05-04 PROCEDURE — 99213 OFFICE O/P EST LOW 20 MIN: CPT | Performed by: INTERNAL MEDICINE

## 2021-05-04 RX ORDER — DICYCLOMINE HYDROCHLORIDE 20 MG/1
TABLET ORAL
Qty: 90 TAB | Refills: 5 | Status: SHIPPED | OUTPATIENT
Start: 2021-05-04 | End: 2021-07-22 | Stop reason: SDUPTHER

## 2021-05-04 RX ORDER — PRAVASTATIN SODIUM 80 MG/1
TABLET ORAL
Qty: 90 TAB | Refills: 1 | Status: SHIPPED | OUTPATIENT
Start: 2021-05-04 | End: 2021-07-22 | Stop reason: SDUPTHER

## 2021-05-04 NOTE — PROGRESS NOTES
Chief Complaint   Patient presents with    Hypertension    Results     labs done 12/4/20 and colonoscopy     Cholesterol Problem     1. Have you been to the ER, urgent care clinic since your last visit? Hospitalized since your last visit? No    2. Have you seen or consulted any other health care providers outside of the [address] [place] [address] since your last visit? Include any pap smears or colon screening. Yes, colonoscopy done last Monday.      Health Maintenance Due   Topic Date Due    Hepatitis C Screening  Never done    Shingrix Vaccine Age 50> (1 of 2) Never done    PAP AKA CERVICAL CYTOLOGY  11/25/2019

## 2021-05-04 NOTE — PROGRESS NOTES
[place] HISTORY OF PRESENT ILLNESS  Kevan Hernandez is a 50 y.o. female who presents for follow-up on hypertension and hyperlipidemia. Labs have been reviewed with patient. LDL cholesterol is 126 all other labs are within normal limits. Consent:  he and/or  healthcare decision maker is aware that this patient-initiated Telehealth encounter is a billable service, with coverage as determined by her insurance carrier. he is aware that she may receive a bill and has provided verbal consent to proceed: Yes    I was at home while conducting this encounter. Hypertension   The history is provided by the patient and medical records. This is a chronic problem. The problem has been rapidly improving. Pertinent negatives include no chest pain, no orthopnea, no headaches, no peripheral edema, no dizziness and no shortness of breath. There are no associated agents to hypertension. Risk factors include dyslipidemia. Results  Pertinent negatives include no chest pain, no abdominal pain, no headaches and no shortness of breath. Cholesterol Problem  The history is provided by the patient and medical records. This is a chronic problem. The problem has been gradually worsening. Pertinent negatives include no chest pain, no abdominal pain, no headaches and no shortness of breath. The symptoms are aggravated by eating. The symptoms are relieved by medications. Treatments tried: Pravachol. The treatment provided significant relief. No Known Allergies  Current Outpatient Medications on File Prior to Visit   Medication Sig Dispense Refill    aspirin delayed-release 81 mg tablet TAKE 1 TABLET BY MOUTH EVERY DAY 90 Tab 1    verapamil ER (CALAN-SR) 120 mg tablet Take 1 Tab by mouth nightly. 90 Tab 1    irbesartan (AVAPRO) 300 mg tablet TAKE 1 TABLET BY MOUTH EVERY DAY AT NIGHT 30 Tab 5    acetaminophen (TylenoL) 325 mg tablet Take  by mouth every six (6) hours as needed for Pain.       [DISCONTINUED] pravastatin (PRAVACHOL) 80 mg tablet TAKE 1 TABLET BY MOUTH EVERY DAY 90 Tab 1    [DISCONTINUED] dicyclomine (BENTYL) 20 mg tablet TAKE 1 TABLET BY MOUTH EVERY SIX (6) HOURS. 90 Tab 5    [DISCONTINUED] naproxen sodium (ANAPROX) 550 mg tablet TAKE 1 TABLET BY MOUTH TWICE A DAY AS NEEDED      [DISCONTINUED] dicyclomine (BENTYL) 20 mg tablet Take 1 Tab by mouth every six (6) hours. 90 Tab 1     No current facility-administered medications on file prior to visit.       Past Medical History:   Diagnosis Date    Elevated blood pressure     Hypercholesterolemia     IBS (irritable bowel syndrome)      Past Surgical History:   Procedure Laterality Date    HX CHOLECYSTECTOMY      HX HYSTERECTOMY       Family History   Problem Relation Age of Onset    Hypertension Mother     Diabetes Mother     Elevated Lipids Mother     Hypertension Father    Annia Gomezs Elevated Lipids Father     Hypertension Sister     Diabetes Sister     Hypertension Brother     Hypertension Sister     Elevated Lipids Sister      Social History     Socioeconomic History    Marital status:      Spouse name: Not on file    Number of children: Not on file    Years of education: Not on file    Highest education level: Not on file   Occupational History    Not on file   Social Needs    Financial resource strain: Not on file    Food insecurity     Worry: Not on file     Inability: Not on file    Transportation needs     Medical: Not on file     Non-medical: Not on file   Tobacco Use    Smoking status: Never Smoker    Smokeless tobacco: Never Used   Substance and Sexual Activity    Alcohol use: No    Drug use: No    Sexual activity: Yes     Partners: Male   Lifestyle    Physical activity     Days per week: Not on file     Minutes per session: Not on file    Stress: Not on file   Relationships    Social connections     Talks on phone: Not on file     Gets together: Not on file     Attends Restorationism service: Not on file     Active member of club or organization: Not on file     Attends meetings of clubs or organizations: Not on file     Relationship status: Not on file    Intimate partner violence     Fear of current or ex partner: Not on file     Emotionally abused: Not on file     Physically abused: Not on file     Forced sexual activity: Not on file   Other Topics Concern    Not on file   Social History Narrative    Not on file       Review of Systems   Constitutional: Negative. Eyes: Negative. Respiratory: Negative. Negative for shortness of breath. Cardiovascular: Negative. Negative for chest pain and orthopnea. Gastrointestinal: Negative for abdominal pain. Musculoskeletal: Negative. Neurological: Negative. Negative for dizziness and headaches. Endo/Heme/Allergies: Negative. Psychiatric/Behavioral: Negative. There were no vitals taken for this visit. Physical Exam  Nursing note reviewed. Constitutional:       Appearance: She is well-developed. HENT:      Head: Normocephalic and atraumatic. Pulmonary:      Effort: Pulmonary effort is normal. No respiratory distress. Musculoskeletal: Normal range of motion. General: No tenderness or deformity. Skin:     General: Skin is dry. Coloration: Skin is not pale. Findings: No erythema or rash. Neurological:      Mental Status: She is alert and oriented to person, place, and time. Cranial Nerves: No cranial nerve deficit. Coordination: Coordination normal.   Psychiatric:         Behavior: Behavior normal.         Thought Content: Thought content normal.         Judgment: Judgment normal.         ASSESSMENT and PLAN    ICD-10-CM ICD-9-CM    1. Essential hypertension  F10 642.3 METABOLIC PANEL, COMPREHENSIVE   2. Hyperlipidemia, unspecified hyperlipidemia type  U23.8 378.4 METABOLIC PANEL, COMPREHENSIVE      LIPID PANEL   We discussed the expected course, resolution and complications of the diagnosis(es) in detail.   Medication risks, benefits, costs, interactions, and alternatives were discussed as indicated. I advised her to contact the office if her condition worsens, changes or fails to improve as anticipated. She expressed understanding with the diagnosis(es) and plan. Pursuant to the emergency declaration under the [facility], [facility] waiver authority and the [employer] Act, this Virtual  Visit was conducted, with patient's consent, to reduce the patient's risk of exposure to COVID-19 and provide continuity of care for an established patient. Services were provided through a video synchronous discussion virtually to substitute for in-person clinic visit. Juli Henry MD      Follow-up and Dispositions    · Return in about 3 months (around 8/4/2021) for Make an appointment to have blood work done, Make appointment for vital signs check.

## 2021-05-10 DIAGNOSIS — I10 ESSENTIAL HYPERTENSION: ICD-10-CM

## 2021-05-10 RX ORDER — IRBESARTAN 300 MG/1
TABLET ORAL
Qty: 30 TAB | Refills: 5 | Status: SHIPPED | OUTPATIENT
Start: 2021-05-10 | End: 2021-07-09

## 2021-05-10 NOTE — TELEPHONE ENCOUNTER
Patient stated she has been out of her irbesartan (AVAPRO) 300 mg tablet for 3 days and need it refilled ASAP

## 2021-07-03 DIAGNOSIS — I10 ESSENTIAL HYPERTENSION: ICD-10-CM

## 2021-07-09 RX ORDER — IRBESARTAN 300 MG/1
TABLET ORAL
Qty: 90 TABLET | Refills: 1 | Status: SHIPPED | OUTPATIENT
Start: 2021-07-09 | End: 2021-07-22 | Stop reason: SDUPTHER

## 2021-07-22 ENCOUNTER — OFFICE VISIT (OUTPATIENT)
Dept: FAMILY MEDICINE CLINIC | Age: 56
End: 2021-07-22
Payer: COMMERCIAL

## 2021-07-22 ENCOUNTER — HOSPITAL ENCOUNTER (OUTPATIENT)
Dept: LAB | Age: 56
Discharge: HOME | End: 2021-07-22
Payer: COMMERCIAL

## 2021-07-22 VITALS
TEMPERATURE: 98.1 F | HEART RATE: 53 BPM | BODY MASS INDEX: 30.69 KG/M2 | HEIGHT: 65 IN | DIASTOLIC BLOOD PRESSURE: 84 MMHG | SYSTOLIC BLOOD PRESSURE: 136 MMHG | OXYGEN SATURATION: 100 % | WEIGHT: 184.2 LBS

## 2021-07-22 DIAGNOSIS — E78.5 HYPERLIPIDEMIA, UNSPECIFIED HYPERLIPIDEMIA TYPE: ICD-10-CM

## 2021-07-22 DIAGNOSIS — R42 DIZZINESS: Primary | ICD-10-CM

## 2021-07-22 DIAGNOSIS — I10 ESSENTIAL HYPERTENSION: ICD-10-CM

## 2021-07-22 LAB
ALBUMIN SERPL-MCNC: 3.8 G/DL (ref 3.4–5)
ALBUMIN/GLOB SERPL: 1 {RATIO} (ref 0.8–1.7)
ALP SERPL-CCNC: 107 U/L (ref 45–117)
ALT SERPL-CCNC: 19 U/L (ref 13–56)
ANION GAP SERPL CALC-SCNC: 7 MMOL/L (ref 3–18)
AST SERPL-CCNC: 19 U/L (ref 10–38)
BILIRUB SERPL-MCNC: 0.4 MG/DL (ref 0.2–1)
BUN SERPL-MCNC: 18 MG/DL (ref 7–18)
BUN/CREAT SERPL: 23 (ref 12–20)
CALCIUM SERPL-MCNC: 8.9 MG/DL (ref 8.5–10.1)
CHLORIDE SERPL-SCNC: 108 MMOL/L (ref 100–111)
CHOLEST SERPL-MCNC: 212 MG/DL
CO2 SERPL-SCNC: 27 MMOL/L (ref 21–32)
CREAT SERPL-MCNC: 0.78 MG/DL (ref 0.6–1.3)
GLOBULIN SER CALC-MCNC: 3.9 G/DL (ref 2–4)
GLUCOSE SERPL-MCNC: 86 MG/DL (ref 74–99)
HDLC SERPL-MCNC: 73 MG/DL (ref 40–60)
HDLC SERPL: 2.9 {RATIO} (ref 0–5)
LDLC SERPL CALC-MCNC: 124 MG/DL (ref 0–100)
LIPID PROFILE,FLP: ABNORMAL
POTASSIUM SERPL-SCNC: 3.9 MMOL/L (ref 3.5–5.5)
PROT SERPL-MCNC: 7.7 G/DL (ref 6.4–8.2)
SODIUM SERPL-SCNC: 142 MMOL/L (ref 136–145)
TRIGL SERPL-MCNC: 75 MG/DL (ref ?–150)
VLDLC SERPL CALC-MCNC: 15 MG/DL

## 2021-07-22 PROCEDURE — 80061 LIPID PANEL: CPT

## 2021-07-22 PROCEDURE — 99213 OFFICE O/P EST LOW 20 MIN: CPT | Performed by: INTERNAL MEDICINE

## 2021-07-22 PROCEDURE — 80053 COMPREHEN METABOLIC PANEL: CPT

## 2021-07-22 PROCEDURE — 36415 COLL VENOUS BLD VENIPUNCTURE: CPT

## 2021-07-22 RX ORDER — DICYCLOMINE HYDROCHLORIDE 20 MG/1
TABLET ORAL
Qty: 90 TABLET | Refills: 5 | Status: SHIPPED | OUTPATIENT
Start: 2021-07-22 | End: 2022-03-14

## 2021-07-22 RX ORDER — PRAVASTATIN SODIUM 80 MG/1
TABLET ORAL
Qty: 90 TABLET | Refills: 1 | Status: SHIPPED | OUTPATIENT
Start: 2021-07-22 | End: 2022-02-04 | Stop reason: SDUPTHER

## 2021-07-22 RX ORDER — MECLIZINE HYDROCHLORIDE 25 MG/1
25 TABLET ORAL
Qty: 90 TABLET | Refills: 0 | Status: SHIPPED | OUTPATIENT
Start: 2021-07-22 | End: 2021-08-01

## 2021-07-22 RX ORDER — IRBESARTAN 300 MG/1
TABLET ORAL
Qty: 90 TABLET | Refills: 1 | Status: SHIPPED | OUTPATIENT
Start: 2021-07-22 | End: 2022-03-14 | Stop reason: SDUPTHER

## 2021-07-22 RX ORDER — ASPIRIN 81 MG/1
TABLET ORAL
Qty: 90 TABLET | Refills: 1 | Status: SHIPPED | OUTPATIENT
Start: 2021-07-22 | End: 2021-10-29 | Stop reason: SDUPTHER

## 2021-07-22 RX ORDER — VERAPAMIL HYDROCHLORIDE 120 MG/1
120 TABLET, FILM COATED, EXTENDED RELEASE ORAL
Qty: 90 TABLET | Refills: 1 | Status: SHIPPED | OUTPATIENT
Start: 2021-07-22 | End: 2022-03-14 | Stop reason: SDUPTHER

## 2021-07-22 NOTE — PROGRESS NOTES
HISTORY OF PRESENT ILLNESS  Elvira Robledo is a 49 y.o. female who presents today for follow-up on hypertension and hyperlipidemia   Hypertension   The history is provided by the patient and medical records. This is a chronic problem. The problem has been rapidly improving. Pertinent negatives include no chest pain, no orthopnea, no headaches, no peripheral edema, no dizziness and no shortness of breath. There are no associated agents to hypertension. Risk factors include dyslipidemia. Cholesterol Problem  The history is provided by the patient and medical records. This is a chronic problem. The problem has been gradually worsening. Pertinent negatives include no chest pain, no abdominal pain, no headaches and no shortness of breath. The symptoms are aggravated by eating. The symptoms are relieved by medications. Treatments tried: Pravachol. The treatment provided significant relief. No Known Allergies  Current Outpatient Medications on File Prior to Visit   Medication Sig Dispense Refill    acetaminophen (TylenoL) 325 mg tablet Take  by mouth every six (6) hours as needed for Pain. No current facility-administered medications on file prior to visit.      Past Medical History:   Diagnosis Date    Elevated blood pressure     Hypercholesterolemia     IBS (irritable bowel syndrome)      Past Surgical History:   Procedure Laterality Date    HX CHOLECYSTECTOMY      HX HYSTERECTOMY       Family History   Problem Relation Age of Onset    Hypertension Mother     Diabetes Mother     Elevated Lipids Mother     Hypertension Father    Aslly Manual Elevated Lipids Father     Hypertension Sister     Diabetes Sister     Hypertension Brother     Hypertension Sister     Elevated Lipids Sister      Social History     Socioeconomic History    Marital status:      Spouse name: Not on file    Number of children: Not on file    Years of education: Not on file    Highest education level: Not on file Occupational History    Not on file   Tobacco Use    Smoking status: Never Smoker    Smokeless tobacco: Never Used   Vaping Use    Vaping Use: Never used   Substance and Sexual Activity    Alcohol use: No    Drug use: No    Sexual activity: Yes     Partners: Male   Other Topics Concern    Not on file   Social History Narrative    Not on file     Social Determinants of Health     Financial Resource Strain:     Difficulty of Paying Living Expenses:    Food Insecurity:     Worried About Running Out of Food in the Last Year:     [address] in the Last Year:    Transportation Needs:     Lack of Transportation (Medical):  Lack of Transportation (Non-Medical):    Physical Activity:     Days of Exercise per Week:     Minutes of Exercise per Session:    Stress:     Feeling of Stress :    Social Connections:     Frequency of Communication with Friends and Family:     Frequency of Social Gatherings with Friends and Family:     Attends Gnosticist Services:     Active Member of Clubs or Organizations:     Attends Club or Organization Meetings:     Marital Status:    Intimate Partner Violence:     Fear of Current or Ex-Partner:     Emotionally Abused:     Physically Abused:     Sexually Abused:        Review of Systems   Constitutional: Negative. Eyes: Negative. Respiratory: Negative. Negative for shortness of breath. Cardiovascular: Negative. Negative for chest pain and orthopnea. Gastrointestinal: Negative for abdominal pain. Musculoskeletal: Negative. Neurological: Negative. Negative for dizziness and headaches. Endo/Heme/Allergies: Negative. Psychiatric/Behavioral: Negative. Physical Exam  Vitals and nursing note reviewed. Constitutional:       Appearance: She is well-developed. HENT:      Head: Normocephalic and atraumatic. Cardiovascular:      Rate and Rhythm: Normal rate and regular rhythm. Heart sounds: Normal heart sounds. No murmur heard.    No friction rub. No gallop. Pulmonary:      Effort: Pulmonary effort is normal. No respiratory distress. Breath sounds: Normal breath sounds. No wheezing or rales. Musculoskeletal:         General: No tenderness or deformity. Normal range of motion. Skin:     General: Skin is warm and dry. Coloration: Skin is not pale. Findings: No erythema or rash. Neurological:      Mental Status: She is alert and oriented to person, place, and time. Cranial Nerves: No cranial nerve deficit. Coordination: Coordination normal.   Psychiatric:         Behavior: Behavior normal.         Thought Content: Thought content normal.         Judgment: Judgment normal.         ASSESSMENT and PLAN    ICD-10-CM ICD-9-CM    1. Dizziness  R42 780.4    2. Essential hypertension  I10 401.9 irbesartan (AVAPRO) 300 mg tablet   Consider switching pravastatin to either Lipitor or Crestor  Follow-up and Dispositions    · Return in about 4 weeks (around 8/19/2021).

## 2021-07-22 NOTE — PROGRESS NOTES
Chief Complaint   Patient presents with    Hypertension     135/73    Dizziness     1. Have you been to the ER, urgent care clinic since your last visit? Hospitalized since your last visit? No    2. Have you seen or consulted any other health care providers outside of the [address] [place] [address] since your last visit? Include any pap smears or colon screening.  Yes neurology last month     Health Maintenance Due   Topic Date Due    Hepatitis C Screening  Never done    Shingrix Vaccine Age 50> (1 of 2) Never done    PAP AKA CERVICAL CYTOLOGY  11/25/2019     Pap smear done last month

## 2021-07-22 NOTE — PATIENT INSTRUCTIONS
Epley Maneuver at Home for Vertigo: Exercises  Introduction  Vertigo is a spinning or whirling sensation when you move your head. Your doctor may have moved you in different positions to help your vertigo get better faster. This is called the Epley maneuver. Your doctor also may have asked you to do these exercises at home. Do the exercises as often as your doctor recommends. If your vertigo is getting worse, your doctor may have you change the exercise or stop it. Step 1  Step 1   1. Sit on the edge of a bed or sofa. Step 2   1. Turn your head 45 degrees in the direction your doctor told you to. This should be toward the ear that causes the most vertigo for you. In this picture, the woman is turning toward her left ear. Step 3   1. Tilt yourself backward until you are lying on your back. Your head should still be at a 45-degree turn. Your head should be about midway between looking straight ahead and looking out to your side. Hold for 30 seconds. If you have vertigo, stay in this position until it stops. Step 4   1. Turn your head 90 degrees toward the ear that has the least vertigo. In this picture, the woman is turning to the right because she has vertigo on her left side. The point of your chin should be raised and over your shoulder. Hold for 30 seconds. Step 5   1. Roll onto the side with the least vertigo. You should now be looking at the floor. Hold for 30 seconds. Follow-up care is a key part of your treatment and safety. Be sure to make and go to all appointments, and call your doctor if you are having problems. It's also a good idea to know your test results and keep a list of the medicines you take. Where can you learn more? Go to http://www.gray.com/  Enter P834 in the search box to learn more about \"Epley Maneuver at Home for Vertigo: Exercises. \"  Current as of: August 4, 2020               Content Version: 12.8  © 4514-6787 [employer]. Care instructions adapted under license by [employer] (which disclaims liability or warranty for this information). If you have questions about a medical condition or this instruction, always ask your healthcare professional. [address] any warranty or liability for your use of this information.

## 2021-11-01 RX ORDER — ASPIRIN 81 MG/1
TABLET ORAL
Qty: 90 TABLET | Refills: 2 | Status: SHIPPED | OUTPATIENT
Start: 2021-11-01 | End: 2022-02-02 | Stop reason: SDUPTHER

## 2021-12-24 NOTE — LETTER
NOTIFICATION RETURN TO WORK  
 
4/26/2017 5:55 PM 
 
Ms. Donovan Moctezuma [address] [address] To Whom It May Concern: Donovan Mocteuzma is currently under the care of [address] [place] [address]. Please excuse Ms. Elton Lazo from work 4/27/17. If there are questions or concerns please have the patient contact our office. Sincerely, Racquel Lomeli MD 
 
                                
 
 [facility]  (A Division of [address] [place] [address])                                                                                                                      Susanne Montoya MD  Office #: - Option # [phone]  Fax #: [phone]     Date of Admission: 12/11/2021Date of Note: 12/24/2021  Reason for Follow Up: Evaluation and antibiotic management of Leucocytosis. Current Antimicrobials:    Prior Antimicrobials:    Flagyl IV 12/15 to date  Vanco oral 12/21 to date    Immunosuppressive drugs: NA Zosyn 12/11 to 12/15  Vanco oral QID 12/15 X1 day  Ceftriaxone 12/16 to 12/21       Assessment- ID related:  --------------------------------------------------------------------------  · Persistent Leucocytosis( w/neutrophilia) - Declining slowly 15.3K -> no occult abscess on CT- visceral organs including prostate look ok except hemangioma liver and R kidney neopolasm) +  pancolitis on CT w/Contrast on 12/14 and 12/21  --C. diff test not done- stool was soft, not watery. --saccharomyces serology negative 12/18  --Flow cytometry: NO DIAGNOSTIC IMMUNOPHENOTYPIC ABNORMALITIES DETECTED. --No diarrhea/ tense distension- that is C/W C.diff.   --has no rash  --has no focal complaint  --has no joint inflammation  --not on steroid  --MRI C/T/L- no evidence of discitis/OM/infection  · Pan-colitis on CT with contrast: Have to consider C.diff in the DDX as at risk, however, pt doesn't look as toxix/septic with this degree of colitis. No acute joint swelling/pain  · Right Kidney lesion -- malignancy- on CT/Ulstrasound- followed by Urology.    · Recent Psedumonas UTI12/5/21  · Urinary retention, with indwelling alonso  CT CAP WO contrast: didn't reveal major infectious finding  12/11  BCX: NGSF  UCX: NG  ESR 74  CRP 15.3    Other Medical Issues- Mx per respective team:    · Acute enecephalopathy  · DM   · CKD  · Anemia- HB 9.2  · Hx of lumbar fusion/ L1 laminectomy  · R renal cyst/calcification. L renal stone-non obstructing     Recommendation for ID issues I am following:  ------------------------------------------------------------------------------    Cont vanco PO  + Flagyl- until 12/31/21    Stool WBC/occult blood ordered after discussing with GI, GI to see for further opinion on pancolitis    Not much to add from ID side-- please call over weekend if questions. Will see him on Monday             Subjective:  Patient seems withdrawn, doesn't voice complaint  Comfortable, lying supine  Alonso in place  Afebrile, WBC 15.7K  No diarreha. Nursing didn't send off stool sample yesterday, dw his RN today    Urology libby reviewed-- no additional investigation- as they have been following renal mass past 4 years since 2017. HPI:  Basim Medina is a 78 y.o. WHITE/NON- with PMH of DM, CKD, recent admission with Pseudmonas UTI, urinary retention with indwelling Alonso came in with fever and confusion per chart review and ED note states wife brought him, unable to take care of him. He was found to have tachycardia 122, soft BP and WBC was high to 23,3K. CT CAP done WO contrast, some changes of colitis, significant finding seen except large BM in colon, and changes in kidney that didn't suggest infectious source. Was placed on zosyn and cultures so far no growth. Patient is oriented to self and place during interview. Feels ok, doesn't have focal comlaint of HA/neck or back pain, chest pain/SOB/Abd pain. Indwelling alonso in place. RN reports soft and frequent BM. No marked change in WBC and remains in 20K's. No rash/itching.         Active Hospital Problems    Diagnosis Date Noted    Colitis 12/13/2021    Acute metabolic encephalopathy 03/42/7098    Constipation 12/12/2021    Sepsis ([facility] 11/23/2021    BRITT (acute kidney injury) ([facility] 07/20/2021    UTI (urinary tract infection) due to urinary indwelling Alonso catheter [facility] 05/01/2020    Type II diabetes mellitus with manifestations, uncontrolled ([facility] 09/16/2017     Past Medical History:   Diagnosis Date    Atherosclerosis     Diabetes ([facility] 2013    type 2    High cholesterol     Hyperlipidemia     Hypertension 2013    Muscle weakness     Neoplasm     right kidney    Severe pulmonary hypertension ([facility]     Spinal stenosis     Urinary retention     Vitamin D deficiency      Past Surgical History:   Procedure Laterality Date    HX HEENT      40 years ago deviated septum    HX LUMBAR LAMINECTOMY  2017    HX ORTHOPAEDIC      Lumbar laminectomy 9/7/17    HX ORTHOPAEDIC Right     CTR     Family History   Problem Relation Age of Onset    Heart Disease Father      Social History     Socioeconomic History    Marital status:      Spouse name: Not on file    Number of children: Not on file    Years of education: Not on file    Highest education level: Not on file   Occupational History    Not on file   Tobacco Use    Smoking status: Former Smoker    Smokeless tobacco: Never Used   Vaping Use    Vaping Use: Never used   Substance and Sexual Activity    Alcohol use: No    Drug use: No    Sexual activity: Not on file   Other Topics Concern     Service Not Asked    Blood Transfusions Not Asked    Caffeine Concern Not Asked    Occupational Exposure Not Asked    Hobby Hazards Not Asked    Sleep Concern Not Asked    Stress Concern Not Asked    Weight Concern Not Asked    Special Diet Not Asked    Back Care Not Asked    Exercise Not Asked    Bike Helmet Not Asked    Seat Belt Not Asked    Self-Exams Not Asked   Social History Narrative    Not on file     Social Determinants of Health     Financial Resource Strain:     Difficulty of Paying Living Expenses: Not on file   Food Insecurity:     Worried About Running Out of Food in the Last Year: Not on file    [place]sm[place] of Food in the Last Year: Not on file   Transportation Needs:     Lack of Transportation (Medical):  Not on file    Lack of Transportation (Non-Medical):  Not on file   Physical Activity:     Days of Exercise per Week: Not on file    Minutes of Exercise per Session: Not on file   Stress:     Feeling of Stress : Not on file   Social Connections:     Frequency of Communication with Friends and Family: Not on file    Frequency of Social Gatherings with Friends and Family: Not on file    Attends Judaism Services: Not on file    Active Member of Clubs or Organizations: Not on file    Attends Club or Organization Meetings: Not on file    Marital Status: Not on file   Intimate Partner Violence:     Fear of Current or Ex-Partner: Not on file    Emotionally Abused: Not on file    Physically Abused: Not on file    Sexually Abused: Not on file   Housing Stability:     Unable to Pay for Housing in the Last Year: Not on file    Number of Places Lived in the Last Year: Not on file    Unstable Housing in the Last Year: Not on file       Allergies:  Zocor [simvastatin]     Medications:  Current Facility-Administered Medications   Medication Dose Route Frequency    vancomycin 50 mg/mL oral solution (compounded) 125 mg  125 mg Oral Q6H    metoprolol succinate (TOPROL-XL) XL tablet 50 mg  50 mg Oral QHS    amLODIPine (NORVASC) tablet 10 mg  10 mg Oral DAILY    nystatin (MYCOSTATIN) 100,000 unit/gram powder   Topical BID    metroNIDAZOLE (FLAGYL) tablet 500 mg  500 mg Oral TID    aspirin chewable tablet 81 mg  81 mg Oral DAILY    atorvastatin (LIPITOR) tablet 20 mg  20 mg Oral QHS    traZODone (DESYREL) tablet 50 mg  50 mg Oral QHS    insulin lispro (HUMALOG) injection   SubCUTAneous AC&HS    glucose chewable tablet 16 g  4 Tablet Oral PRN    glucagon (GLUCAGEN) injection 1 mg  1 mg IntraMUSCular PRN    dextrose (D50W) injection syrg 12.5-25 g  25-50 mL IntraVENous PRN    sodium chloride (NS) flush 5-40 mL  5-40 mL IntraVENous Q8H    sodium chloride (NS) flush 5-40 mL  5-40 mL IntraVENous PRN    acetaminophen (TYLENOL) tablet 650 mg  650 mg Oral Q6H PRN    Or    acetaminophen (TYLENOL) suppository 650 mg  650 mg Rectal Q6H PRN    ondansetron (ZOFRAN ODT) tablet 4 mg  4 mg Oral Q8H PRN    Or    ondansetron (ZOFRAN) injection 4 mg  4 mg IntraVENous Q6H PRN    famotidine (PEPCID) tablet 20 mg  20 mg Oral DAILY    heparin (porcine) injection 5,000 Units  5,000 Units SubCUTAneous Q8H    alum-mag hydroxide-simeth (MYLANTA) oral suspension 15 mL  15 mL Oral Q6H PRN      Physical Exam:    Temp (24hrs), Av.1 °F (36.7 °C), Min:97.4 °F (36.3 °C), Max:98.5 °F (36.9 °C)    Visit Vitals  /71 (BP 1 Location: Right upper arm)   Pulse 87   Temp 97.9 °F (36.6 °C)   Resp 17   Ht 5' 11\" (1.803 m)   Wt 66.1 kg (145 lb 12.8 oz)   SpO2 93%   BMI 20.33 kg/m²          GEN: WD chronically ill looking, non toxic, not in any discomfort. On RA- supine    HEENT:  EOMI intact  CHEST: Non laboured breathing. ABD: Obese/soft. Soft and non distended. No reboud tenderness. [place] in place  EXT: No apparent swelling or redness on UE/LE joints. No arthritis noted  Skin: Dry and intact. Exocriated groin area- has powder in place  CNS: A, O X2. Moves all extremity. CN grossly ok.       Microbiology  All Micro Results     Procedure Component Value Units Date/Time    CULTURE, BLOOD [[id number]] Collected: 21    Order Status: Completed Specimen: Blood Updated: 21     Special Requests: NO SPECIAL REQUESTS        Culture result: NO GROWTH 6 DAYS       CULTURE, BLOOD [[id number]] Collected: 21    Order Status: Completed Specimen: Blood Updated: 21     Special Requests: NO SPECIAL REQUESTS        Culture result: NO GROWTH 6 DAYS       CULTURE, URINE [[id number]] Collected: 21    Order Status: Completed Specimen: Urine from Clean catch Updated: 21     Special Requests: NO SPECIAL REQUESTS        Culture result: No growth (<1,000 CFU/ML)            Lab results:    Chemistry  Recent Labs 12/24/21 0222 12/23/21 0336 12/22/21  0200   * 122* 116*    146* 146*   K 3.8 3.9 3.8    113* 113*   CO2 27 25 25   BUN 20* 18 16   CREA 0.93 0.82 0.94   CA 8.1* 8.0* 8.0*   AGAP 6 8 8   BUCR 22* 22* 17    107 112   TP 5.6* 5.4* 5.6*   ALB 2.2* 1.9* 1.9*   GLOB 3.4 3.5 3.7   AGRAT 0.6* 0.5* 0.5*       CBC w/ Diff  Recent Labs     12/24/21 0222 12/23/21 0336 12/22/21  0200   WBC 15.7* 15.7* 15.3*   RBC 3.67* 3.60* 3.71*   HGB 9.3* 9.2* 9.5*   HCT 31.9* 30.0* 31.6*   * 453* 382   GRANS  --  68 69   LYMPH  --  14* 13*   EOS  --  3 2       Imaging: report posted below as per radiologist   CT CAP WO        Wall thickening of the rectum. This may be due to colitis/proctitis. Relatively  large amount of fecal material throughout the colon. This could also be a source  of inflammation.     Otherwise no evidence of acute abnormality.     Atherosclerotic vascular disease. Emphysema. Large right renal cyst    CXR: No acute process    12/15/21  CTA chest:  Exam limited by motion artifact. No pulmonary emboli identified   Minimal bilateral pleural effusions. Emphysema      CT AP with contrast:    Pancolitis. Interval progression of wall thickening to involve nearly the entire  colon. Thumb printing present raising the possibility of C. difficile. This  involves 2 vascular territories making ischemic colitis unlikely  Enhancing renal mass lower pole right kidney as seen on prior ultrasound  No hydronephrosis.

## 2022-02-03 RX ORDER — ASPIRIN 81 MG/1
TABLET ORAL
Qty: 90 TABLET | Refills: 2 | Status: SHIPPED | OUTPATIENT
Start: 2022-02-03 | End: 2022-02-04 | Stop reason: SDUPTHER

## 2022-02-04 RX ORDER — PRAVASTATIN SODIUM 80 MG/1
TABLET ORAL
Qty: 90 TABLET | Refills: 1 | Status: SHIPPED | OUTPATIENT
Start: 2022-02-04 | End: 2022-09-08 | Stop reason: SDUPTHER

## 2022-02-04 RX ORDER — ASPIRIN 81 MG/1
TABLET ORAL
Qty: 90 TABLET | Refills: 2 | Status: SHIPPED | OUTPATIENT
Start: 2022-02-04

## 2022-03-14 ENCOUNTER — OFFICE VISIT (OUTPATIENT)
Dept: FAMILY MEDICINE CLINIC | Age: 57
End: 2022-03-14

## 2022-03-14 VITALS
HEART RATE: 70 BPM | BODY MASS INDEX: 30.82 KG/M2 | DIASTOLIC BLOOD PRESSURE: 86 MMHG | RESPIRATION RATE: 18 BRPM | TEMPERATURE: 97.6 F | WEIGHT: 185 LBS | HEIGHT: 65 IN | OXYGEN SATURATION: 99 % | SYSTOLIC BLOOD PRESSURE: 130 MMHG

## 2022-03-14 DIAGNOSIS — I10 PRIMARY HYPERTENSION: Primary | ICD-10-CM

## 2022-03-14 DIAGNOSIS — E78.2 MIXED HYPERLIPIDEMIA: ICD-10-CM

## 2022-03-14 DIAGNOSIS — K58.0 IRRITABLE BOWEL SYNDROME WITH DIARRHEA: ICD-10-CM

## 2022-03-14 DIAGNOSIS — Z11.59 NEED FOR HEPATITIS C SCREENING TEST: ICD-10-CM

## 2022-03-14 PROBLEM — J02.9 SORE THROAT: Status: RESOLVED | Noted: 2018-10-25 | Resolved: 2022-03-14

## 2022-03-14 PROCEDURE — 99214 OFFICE O/P EST MOD 30 MIN: CPT | Performed by: NURSE PRACTITIONER

## 2022-03-14 RX ORDER — VERAPAMIL HYDROCHLORIDE 120 MG/1
120 TABLET, FILM COATED, EXTENDED RELEASE ORAL
Qty: 90 TABLET | Refills: 1 | Status: SHIPPED | OUTPATIENT
Start: 2022-03-14 | End: 2022-10-25 | Stop reason: SDUPTHER

## 2022-03-14 RX ORDER — DICYCLOMINE HYDROCHLORIDE 10 MG/1
CAPSULE ORAL
Status: CANCELLED | OUTPATIENT
Start: 2022-03-14

## 2022-03-14 RX ORDER — FAMOTIDINE 40 MG/1
TABLET, FILM COATED ORAL
COMMUNITY
Start: 2022-03-04 | End: 2022-10-25

## 2022-03-14 RX ORDER — IRBESARTAN 300 MG/1
TABLET ORAL
Qty: 90 TABLET | Refills: 1 | Status: SHIPPED | OUTPATIENT
Start: 2022-03-14 | End: 2022-10-25 | Stop reason: SDUPTHER

## 2022-03-14 RX ORDER — DICYCLOMINE HYDROCHLORIDE 10 MG/1
CAPSULE ORAL
COMMUNITY
Start: 2022-03-04 | End: 2022-09-07 | Stop reason: SDUPTHER

## 2022-03-14 NOTE — PROGRESS NOTES
Room7    Did patient bring someone? No    Did the patient have DME equipment? No     Did you take your medication today? Yes       1. \"Have you been to the ER, urgent care clinic since your last visit? Hospitalized since your last visit? \" No    2. \"Have you seen or consulted any other health care providers outside of the [address] [place] [address] since your last visit? \" No     3. For patients aged 39-70: Has the patient had a colonoscopy / FIT/ Cologuard? Yes - no Care Gap present      If the patient is female:    4. For patients aged 41-77: Has the patient had a mammogram within the past 2 years? Yes - no Care Gap present      5. For patients aged 21-65: Has the patient had a pap smear? No Patient states I am going to go to my OBGYN at [facility] .         3 most recent PHQ Screens 3/14/2022   Little interest or pleasure in doing things Not at all   Feeling down, depressed, irritable, or hopeless Not at all   Total Score PHQ 2 0         Health Maintenance Due   Topic Date Due    Hepatitis C Screening  Never done    Shingrix Vaccine Age 50> (1 of 2) Never done    Cervical cancer screen  11/25/2019    Flu Vaccine (1) 09/01/2021       Learning Assessment 11/11/2019   PRIMARY LEARNER Patient   PRIMARY LANGUAGE ENGLISH   LEARNER PREFERENCE PRIMARY PICTURES     LISTENING     VIDEOS     DEMONSTRATION     READING   ANSWERED BY patient   RELATIONSHIP SELF

## 2022-03-14 NOTE — PROGRESS NOTES
[address] [place] [address]               [phone]      Duc Montalvo is a 49 y.o. female and presents with Follow-up and Medication Refill       Assessment/Plan:    Diagnoses and all orders for this visit:    1. Primary hypertension  -     irbesartan (AVAPRO) 300 mg tablet; TAKE 1 TABLET BY MOUTH EVERY DAY AT NIGHT  -     METABOLIC PANEL, COMPREHENSIVE; Future  -     verapamil ER (CALAN-SR) 120 mg tablet; Take 1 Tablet by mouth nightly. Endorses medication compliance, Refill provided, Have asked him/her to come in for follow-up labs and Blood pressure stable, continue irbesartan and verapimil   2. Mixed hyperlipidemia  -     LIPID PANEL; Future  Endorses medication compliance, Have asked him/her to come in for follow-up labs and Denies abdominal pain or symptoms of myalgia   3. Irritable bowel syndrome with diarrhea  Managed by GI taking bentyl, patient states currently controlled  4. Need for hepatitis C screening test  -     HEPATITIS C AB; Future  Health maintenance needs addressed    Follow-up and Dispositions    · Return for ASAP, Lab, AND, 4month, HTN, HLD, 15 min, office only. Health Maintenance:   Health Maintenance   Topic Date Due    Hepatitis C Screening  Never done    Shingrix Vaccine Age 50> (1 of 2) Never done    Cervical cancer screen  11/25/2019    Flu Vaccine (1) 09/01/2021    Depression Screen  07/22/2022    Lipid Screen  07/22/2022    Breast Cancer Screen Mammogram  10/19/2022    Colorectal Cancer Screening Combo  03/08/2026    DTaP/Tdap/Td series (3 - Td or Tdap) 03/22/2027    COVID-19 Vaccine  Completed    Pneumococcal 0-64 years  Aged Out        Subjective:    Labs obtained prior to visit? NO  Reviewed with patient?  Not applicable    Hypertension:  Visit Vitals  /86   Pulse 70   Temp 97.6 °F (36.4 °C) (Temporal)   Resp 18   Ht 5' 5\" (1.651 m)   Wt 185 lb (83.9 kg)   SpO2 99%   BMI 30.79 kg/m²      Patient reports taking medications as instructed. yes   Medication side effects noted. no  Headache upon wakening. no   Home BP monitoring: Does not check  Do you experience chest pain/pressure or SOB with exertion? no  Maintain a low Sodium diet? no  Key CAD CHF Meds             irbesartan (AVAPRO) 300 mg tablet (Taking) TAKE 1 TABLET BY MOUTH EVERY DAY AT NIGHT    verapamil ER (CALAN-SR) 120 mg tablet (Taking) Take 1 Tablet by mouth nightly. pravastatin (PRAVACHOL) 80 mg tablet (Taking) TAKE 1 TABLET BY MOUTH EVERY DAY    aspirin delayed-release 81 mg tablet (Taking) TAKE 1 TABLET BY MOUTH EVERY DAY        BUN   Date Value Ref Range Status   07/22/2021 18 7.0 - 18 MG/DL Final     Creatinine   Date Value Ref Range Status   07/22/2021 0.78 0.6 - 1.3 MG/DL Final     GFR est AA   Date Value Ref Range Status   07/22/2021 >60 >60 ml/min/1.73m2 Final     Potassium   Date Value Ref Range Status   07/22/2021 3.9 3.5 - 5.5 mmol/L Final       HLD:  Has been compliant with meds  Yes  Compliant with low-fat diet. occasionally    Denies myalgias or other side effects. yes  The 10-year ASCVD risk score (Maximilian Cyr, et al., 2013) is: 3.8%    Cholesterol, total   Date Value Ref Range Status   07/22/2021 212 (H) <200 MG/DL Final     Triglyceride   Date Value Ref Range Status   07/22/2021 75 <150 MG/DL Final     Comment:     The drugs N-acetylcysteine (NAC) and  Metamiszole have been found to cause falsely  low results in this chemical assay. Please  be sure to submit blood samples obtained  BEFORE administration of either of these  drugs to assure correct results. HDL Cholesterol   Date Value Ref Range Status   07/22/2021 73 (H) 40 - 60 MG/DL Final     LDL, calculated   Date Value Ref Range Status   07/22/2021 124 (H) 0 - 100 MG/DL Final   ]  Key Antihyperlipidemia Meds             pravastatin (PRAVACHOL) 80 mg tablet (Taking) TAKE 1 TABLET BY MOUTH EVERY DAY           ROS:     ROS  As stated in HPI, otherwise all others negative.      The problem list was updated as a part of today's visit. Patient Active Problem List   Diagnosis Code    Family history of diabetes mellitus in mother Z80.1   [facility] Screening for cholesterol level Z13.220    Elevated blood sugar R73.9    Moderate mixed hyperlipidemia not requiring statin therapy E78.2    Primary hypertension I10    Mild cognitive disorder F09    Irritable bowel syndrome with diarrhea K58.0       The PSH, FH were reviewed. SH:  Social History     Tobacco Use    Smoking status: Never Smoker    Smokeless tobacco: Never Used   Vaping Use    Vaping Use: Never used   Substance Use Topics    Alcohol use: No    Drug use: No       Medications/Allergies:  Current Outpatient Medications on File Prior to Visit   Medication Sig Dispense Refill    dicyclomine (BENTYL) 10 mg capsule       famotidine (PEPCID) 40 mg tablet       pravastatin (PRAVACHOL) 80 mg tablet TAKE 1 TABLET BY MOUTH EVERY DAY 90 Tablet 1    aspirin delayed-release 81 mg tablet TAKE 1 TABLET BY MOUTH EVERY DAY 90 Tablet 2    acetaminophen (TylenoL) 325 mg tablet Take  by mouth every six (6) hours as needed for Pain.  [DISCONTINUED] dicyclomine (BENTYL) 20 mg tablet TAKE 1 TABLET BY MOUTH EVERY SIX (6) HOURS. (Patient not taking: Reported on 3/14/2022) 90 Tablet 5    [DISCONTINUED] irbesartan (AVAPRO) 300 mg tablet TAKE 1 TABLET BY MOUTH EVERY DAY AT NIGHT 90 Tablet 1    [DISCONTINUED] verapamil ER (CALAN-SR) 120 mg tablet Take 1 Tablet by mouth nightly. 90 Tablet 1     No current facility-administered medications on file prior to visit. No Known Allergies    Objective:  Visit Vitals  /86   Pulse 70   Temp 97.6 °F (36.4 °C) (Temporal)   Resp 18   Ht 5' 5\" (1.651 m)   Wt 185 lb (83.9 kg)   SpO2 99%   BMI 30.79 kg/m²    Body mass index is 30.79 kg/m². Physical assessment  Physical Exam  Vitals and nursing note reviewed.    Eyes:      Conjunctiva/sclera: Conjunctivae normal.      Pupils: Pupils are equal, round, and reactive to light. Cardiovascular:      Rate and Rhythm: Normal rate and regular rhythm. Heart sounds: Normal heart sounds. No murmur heard. No friction rub. No gallop. Pulmonary:      Effort: Pulmonary effort is normal.      Breath sounds: Normal breath sounds. Musculoskeletal:         General: Normal range of motion. Cervical back: Normal range of motion. Skin:     General: Skin is warm and dry. Neurological:      Mental Status: She is alert. Labwork and Ancillary Studies:    CBC w/Diff  Lab Results   Component Value Date/Time    WBC 5.0 11/24/2017 10:18 AM    HGB 10.8 (L) 11/24/2017 10:18 AM    PLATELET 950 11/30/3804 10:18 AM         Basic Metabolic Profile  Lab Results   Component Value Date/Time    Sodium 142 07/22/2021 09:57 AM    Potassium 3.9 07/22/2021 09:57 AM    Chloride 108 07/22/2021 09:57 AM    CO2 27 07/22/2021 09:57 AM    Anion gap 7 07/22/2021 09:57 AM    Glucose 86 07/22/2021 09:57 AM    BUN 18 07/22/2021 09:57 AM    Creatinine 0.78 07/22/2021 09:57 AM    BUN/Creatinine ratio 23 (H) 07/22/2021 09:57 AM    GFR est AA >60 07/22/2021 09:57 AM    GFR est non-AA >60 07/22/2021 09:57 AM    Calcium 8.9 07/22/2021 09:57 AM        Cholesterol  Lab Results   Component Value Date/Time    Cholesterol, total 212 (H) 07/22/2021 09:57 AM    HDL Cholesterol 73 (H) 07/22/2021 09:57 AM    LDL, calculated 124 (H) 07/22/2021 09:57 AM    Triglyceride 75 07/22/2021 09:57 AM    CHOL/HDL Ratio 2.9 07/22/2021 09:57 AM           I have discussed the diagnosis with the patient and the intended plan as seen in the above orders. The patient has received an After-Visit Summary and questions were answered concerning future plans. An After Visit Summary was printed and given to the patient. All diagnosis have been discussed with the patient and all of the patient's questions have been answered.      Follow-up and Dispositions    · Return for ASAP, Lab, AND, 4month, HTN, HLD, 15 min, office only. Niki Peng, [facility]  [address] [facility]   [address] [facility] [address]  [address]

## 2022-03-18 PROBLEM — E78.2 MODERATE MIXED HYPERLIPIDEMIA NOT REQUIRING STATIN THERAPY: Status: ACTIVE | Noted: 2018-10-25

## 2022-03-19 PROBLEM — F09 MILD COGNITIVE DISORDER: Status: ACTIVE | Noted: 2020-06-02

## 2022-03-19 PROBLEM — I10 PRIMARY HYPERTENSION: Status: ACTIVE | Noted: 2018-10-25

## 2022-07-14 RX ORDER — DICYCLOMINE HYDROCHLORIDE 10 MG/1
20 CAPSULE ORAL 4 TIMES DAILY
Status: CANCELLED | OUTPATIENT
Start: 2022-07-14

## 2022-08-18 ENCOUNTER — TELEPHONE (OUTPATIENT)
Dept: FAMILY MEDICINE CLINIC | Age: 57
End: 2022-08-18

## 2022-08-18 NOTE — TELEPHONE ENCOUNTER
----- Message from Jimmy Paige sent at 8/18/2022 12:02 PM EDT -----  Subject: Message to Provider    QUESTIONS  Information for Provider? Patient is calling she found out that the used   car she purchased from a local dealership, has [address]; which was   not revealed to her at time of purchase. She is wanting to have a test to   reveal if this has affected her any way. She is worried about her health   and her grandchildern.  ---------------------------------------------------------------------------  --------------  Jae DON  [phone]; OK to leave message on voicemail  ---------------------------------------------------------------------------  --------------  SCRIPT ANSWERS  Relationship to Patient?  Self

## 2022-08-18 NOTE — TELEPHONE ENCOUNTER
Called patient  Advised her there is no testing to be done unless she is having symptoms of some sort. She states she is not having any symptoms. She has called the health department and will let me know if they happen to recommend some type of testing. All diagnosis have been discussed with the patient and all of the patient's questions have been answered.

## 2022-08-30 ENCOUNTER — TELEPHONE (OUTPATIENT)
Dept: FAMILY MEDICINE CLINIC | Age: 57
End: 2022-08-30

## 2022-08-30 NOTE — TELEPHONE ENCOUNTER
Please advise her a referral is not needed for gyn, she can go anywhere that takes her insurance. Dr. Amber Wilhelm is with [facility] [phone].

## 2022-08-30 NOTE — TELEPHONE ENCOUNTER
----- Message from Dee Rojas sent at 8/30/2022 11:20 AM EDT -----  Subject: Message to Provider    QUESTIONS  Information for Provider? pt hermila gynecologist appt before 9/25 because her   ins is changing then. Pt will be happy to see any gyno available but does   not want annual physical appt just gyno appt  ---------------------------------------------------------------------------  --------------  Betty GONZALEZ  [phone]; OK to leave message on voicemail  ---------------------------------------------------------------------------  --------------  SCRIPT ANSWERS  Relationship to Patient?  Self

## 2022-08-31 NOTE — TELEPHONE ENCOUNTER
Patient has been informed to call around to find a  Gynecologist due to referral is not needed. Patient verbalized understanding.

## 2022-09-07 RX ORDER — DICYCLOMINE HYDROCHLORIDE 10 MG/1
10 CAPSULE ORAL 3 TIMES DAILY
Qty: 90 CAPSULE | Refills: 3 | Status: SHIPPED | OUTPATIENT
Start: 2022-09-07

## 2022-09-08 RX ORDER — PRAVASTATIN SODIUM 80 MG/1
TABLET ORAL
Qty: 90 TABLET | Refills: 1 | Status: SHIPPED | OUTPATIENT
Start: 2022-09-08 | End: 2022-10-25 | Stop reason: SDUPTHER

## 2022-10-25 ENCOUNTER — OFFICE VISIT (OUTPATIENT)
Dept: FAMILY MEDICINE CLINIC | Age: 57
End: 2022-10-25
Payer: COMMERCIAL

## 2022-10-25 ENCOUNTER — HOSPITAL ENCOUNTER (OUTPATIENT)
Dept: LAB | Age: 57
Discharge: HOME | End: 2022-10-25
Payer: COMMERCIAL

## 2022-10-25 VITALS
WEIGHT: 180 LBS | BODY MASS INDEX: 29.99 KG/M2 | OXYGEN SATURATION: 97 % | SYSTOLIC BLOOD PRESSURE: 160 MMHG | TEMPERATURE: 97.8 F | RESPIRATION RATE: 18 BRPM | DIASTOLIC BLOOD PRESSURE: 94 MMHG | HEIGHT: 65 IN | HEART RATE: 64 BPM

## 2022-10-25 DIAGNOSIS — E78.2 MIXED HYPERLIPIDEMIA: ICD-10-CM

## 2022-10-25 DIAGNOSIS — Z12.4 SCREENING FOR MALIGNANT NEOPLASM OF CERVIX: ICD-10-CM

## 2022-10-25 DIAGNOSIS — Z01.419 WELL WOMAN EXAM WITH ROUTINE GYNECOLOGICAL EXAM: Primary | ICD-10-CM

## 2022-10-25 DIAGNOSIS — Z12.31 ENCOUNTER FOR SCREENING MAMMOGRAM FOR MALIGNANT NEOPLASM OF BREAST: ICD-10-CM

## 2022-10-25 DIAGNOSIS — Z12.39 ENCOUNTER FOR BREAST CANCER SCREENING USING NON-MAMMOGRAM MODALITY: ICD-10-CM

## 2022-10-25 DIAGNOSIS — I10 PRIMARY HYPERTENSION: ICD-10-CM

## 2022-10-25 DIAGNOSIS — Z11.3 SCREENING EXAMINATION FOR STD (SEXUALLY TRANSMITTED DISEASE): ICD-10-CM

## 2022-10-25 LAB
ALBUMIN SERPL-MCNC: 3.8 G/DL (ref 3.4–5)
ALBUMIN/GLOB SERPL: 1 {RATIO} (ref 0.8–1.7)
ALP SERPL-CCNC: 94 U/L (ref 45–117)
ALT SERPL-CCNC: 22 U/L (ref 13–56)
ANION GAP SERPL CALC-SCNC: 6 MMOL/L (ref 3–18)
AST SERPL-CCNC: 18 U/L (ref 10–38)
BILIRUB SERPL-MCNC: 0.5 MG/DL (ref 0.2–1)
BUN SERPL-MCNC: 13 MG/DL (ref 7–18)
BUN/CREAT SERPL: 15 (ref 12–20)
CALCIUM SERPL-MCNC: 9.7 MG/DL (ref 8.5–10.1)
CHLORIDE SERPL-SCNC: 107 MMOL/L (ref 100–111)
CHOLEST SERPL-MCNC: 206 MG/DL
CO2 SERPL-SCNC: 28 MMOL/L (ref 21–32)
CREAT SERPL-MCNC: 0.88 MG/DL (ref 0.6–1.3)
GLOBULIN SER CALC-MCNC: 3.7 G/DL (ref 2–4)
GLUCOSE SERPL-MCNC: 92 MG/DL (ref 74–99)
HDLC SERPL-MCNC: 78 MG/DL (ref 40–60)
HDLC SERPL: 2.6 {RATIO} (ref 0–5)
LDLC SERPL CALC-MCNC: 110.6 MG/DL (ref 0–100)
LIPID PROFILE,FLP: ABNORMAL
POTASSIUM SERPL-SCNC: 3.9 MMOL/L (ref 3.5–5.5)
PROT SERPL-MCNC: 7.5 G/DL (ref 6.4–8.2)
SODIUM SERPL-SCNC: 141 MMOL/L (ref 136–145)
TRIGL SERPL-MCNC: 87 MG/DL (ref ?–150)
VLDLC SERPL CALC-MCNC: 17.4 MG/DL

## 2022-10-25 PROCEDURE — 87563 M. GENITALIUM AMP PROBE: CPT

## 2022-10-25 PROCEDURE — 36415 COLL VENOUS BLD VENIPUNCTURE: CPT

## 2022-10-25 PROCEDURE — 80061 LIPID PANEL: CPT

## 2022-10-25 PROCEDURE — 88175 CYTOPATH C/V AUTO FLUID REDO: CPT

## 2022-10-25 PROCEDURE — 87624 HPV HI-RISK TYP POOLED RSLT: CPT

## 2022-10-25 PROCEDURE — 80053 COMPREHEN METABOLIC PANEL: CPT

## 2022-10-25 PROCEDURE — 99396 PREV VISIT EST AGE 40-64: CPT | Performed by: NURSE PRACTITIONER

## 2022-10-25 RX ORDER — PRAVASTATIN SODIUM 80 MG/1
TABLET ORAL
Qty: 90 TABLET | Refills: 1 | Status: SHIPPED | OUTPATIENT
Start: 2022-10-25

## 2022-10-25 RX ORDER — IRBESARTAN 300 MG/1
TABLET ORAL
Qty: 90 TABLET | Refills: 1 | Status: SHIPPED | OUTPATIENT
Start: 2022-10-25

## 2022-10-25 RX ORDER — VERAPAMIL HYDROCHLORIDE 120 MG/1
120 TABLET, FILM COATED, EXTENDED RELEASE ORAL
Qty: 90 TABLET | Refills: 1 | Status: SHIPPED | OUTPATIENT
Start: 2022-10-25

## 2022-10-25 NOTE — PROGRESS NOTES
Room 7     Patient presents today for GYN exam and assisted LUCERO Gaston at bedside with pap. When asked if patient has any concerns she would like to address with LUCERO Licona patient states yes, no . Did patient bring someone? No    Did the patient have DME equipment? No     Did you take your medication today? Patient states yes besides the Irbesartan due to pharmacy has been out for 5 days. 1. \"Have you been to the ER, urgent care clinic since your last visit? Hospitalized since your last visit? \"  Patient states I went to [facility] due to side pain . Patient states I thought it was my appendix. 2. \"Have you seen or consulted any other health care providers outside of the [address] [place] [address] since your last visit? \" No     3. For patients aged 39-70: Has the patient had a colonoscopy / FIT/ Cologuard? Yes - no Care Gap present      If the patient is female:    4. For patients aged 41-77: Has the patient had a mammogram within the past 2 years? No Order has been placed       5. For patients aged 21-65: Has the patient had a pap smear?  No Patient has pap appointment today with LUCERO Gaston .         3 most recent PHQ Screens 10/25/2022   PHQ Not Done -   Little interest or pleasure in doing things Not at all   Feeling down, depressed, irritable, or hopeless Not at all   Total Score PHQ 2 0         Health Maintenance Due   Topic Date Due    Hepatitis C Screening  Never done    Shingrix Vaccine Age 50> (1 of 2) Never done    COVID-19 Vaccine (4 - Booster for Pfizer series) 04/09/2022    Lipid Screen  07/22/2022    Flu Vaccine (1) 08/01/2022    Breast Cancer Screen Mammogram  10/19/2022       Learning Assessment 11/11/2019   PRIMARY LEARNER Patient   PRIMARY LANGUAGE ENGLISH   LEARNER PREFERENCE PRIMARY PICTURES     LISTENING     VIDEOS     DEMONSTRATION     READING   ANSWERED BY patient   RELATIONSHIP SELF

## 2022-10-25 NOTE — PATIENT INSTRUCTIONS
DASH Diet: Care Instructions  Your Care Instructions     The DASH diet is an eating plan that can help lower your blood pressure. DASH stands for Dietary Approaches to Stop Hypertension. Hypertension is high blood pressure. The DASH diet focuses on eating foods that are high in calcium, potassium, and magnesium. These nutrients can lower blood pressure. The foods that are highest in these nutrients are fruits, vegetables, low-fat dairy products, nuts, seeds, and legumes. But taking calcium, potassium, and magnesium supplements instead of eating foods that are high in those nutrients does not have the same effect. The DASH diet also includes whole grains, fish, and poultry. The DASH diet is one of several lifestyle changes your doctor may recommend to lower your high blood pressure. Your doctor may also want you to decrease the amount of sodium in your diet. Lowering sodium while following the DASH diet can lower blood pressure even further than just the DASH diet alone. Follow-up care is a key part of your treatment and safety. Be sure to make and go to all appointments, and call your doctor if you are having problems. It's also a good idea to know your test results and keep a list of the medicines you take. How can you care for yourself at home? Following the DASH diet  Eat 4 to 5 servings of fruit each day. A serving is 1 medium-sized piece of fruit, ½ cup chopped or canned fruit, 1/4 cup dried fruit, or 4 ounces (½ cup) of fruit juice. Choose fruit more often than fruit juice. Eat 4 to 5 servings of vegetables each day. A serving is 1 cup of lettuce or raw leafy vegetables, ½ cup of chopped or cooked vegetables, or 4 ounces (½ cup) of vegetable juice. Choose vegetables more often than vegetable juice. Get 2 to 3 servings of low-fat and fat-free dairy each day. A serving is 8 ounces of milk, 1 cup of yogurt, or 1 ½ ounces of cheese. Eat 6 to 8 servings of grains each day.  A serving is 1 slice of bread, 1 ounce of dry cereal, or ½ cup of cooked rice, pasta, or cooked cereal. Try to choose whole-grain products as much as possible. Limit lean meat, poultry, and fish to 2 servings each day. A serving is 3 ounces, about the size of a deck of cards. Eat 4 to 5 servings of nuts, seeds, and legumes (cooked dried beans, lentils, and split peas) each week. A serving is 1/3 cup of nuts, 2 tablespoons of seeds, or ½ cup of cooked beans or peas. Limit fats and oils to 2 to 3 servings each day. A serving is 1 teaspoon of vegetable oil or 2 tablespoons of salad dressing. Limit sweets and added sugars to 5 servings or less a week. A serving is 1 tablespoon jelly or jam, ½ cup sorbet, or 1 cup of lemonade. Eat less than 2,300 milligrams (mg) of sodium a day. If you limit your sodium to 1,500 mg a day, you can lower your blood pressure even more. Tips for success  Start small. Do not try to make dramatic changes to your diet all at once. You might feel that you are missing out on your favorite foods and then be more likely to not follow the plan. Make small changes, and stick with them. Once those changes become habit, add a few more changes. Try some of the following:  Make it a goal to eat a fruit or vegetable at every meal and at snacks. This will make it easy to get the recommended amount of fruits and vegetables each day. Try yogurt topped with fruit and nuts for a snack or healthy dessert. Add lettuce, tomato, cucumber, and onion to sandwiches. Combine a ready-made pizza crust with low-fat mozzarella cheese and lots of vegetable toppings. Try using tomatoes, squash, spinach, broccoli, carrots, cauliflower, and onions. Have a variety of cut-up vegetables with a low-fat dip as an appetizer instead of chips and dip. Sprinkle sunflower seeds or chopped almonds over salads. Or try adding chopped walnuts or almonds to cooked vegetables. Try some vegetarian meals using beans and peas.  Add garbanzo or kidney beans to salads. Make burritos and tacos with mashed vasquez beans or black beans. Where can you learn more? Go to http://www.[employer].com/  Enter H967 in the search box to learn more about \"DASH Diet: Care Instructions. \"  Current as of: December 16, 2019               Content Version: 12.6  © 6003-7528 [employer]. Care instructions adapted under license by [employer] (which disclaims liability or warranty for this information). If you have questions about a medical condition or this instruction, always ask your healthcare professional. [address] any warranty or liability for your use of this information.

## 2022-10-25 NOTE — PROGRESS NOTES
Rosalino Rome               [phone]      Subjective:   56 y.o. female for Well Woman Check. No LMP recorded. (Menstrual status: Menopause). Social History: single partner, contraception - tubal ligation. Pertinent past medical hstory: hypertension. Patient Active Problem List   Diagnosis Code    Family history of diabetes mellitus in mother Z80.1    Mixed hyperlipidemia E78.2    Primary hypertension I10    Mild cognitive disorder F09    Irritable bowel syndrome with diarrhea K58.0     Current Outpatient Medications   Medication Sig Dispense Refill    irbesartan (AVAPRO) 300 mg tablet TAKE 1 TABLET BY MOUTH EVERY DAY AT NIGHT 90 Tablet 1    pravastatin (PRAVACHOL) 80 mg tablet TAKE 1 TABLET BY MOUTH EVERY DAY 90 Tablet 1    verapamil ER (CALAN-SR) 120 mg tablet Take 1 Tablet by mouth nightly. 90 Tablet 1    dicyclomine (BENTYL) 10 mg capsule Take 1 Capsule by mouth three (3) times daily. 90 Capsule 3    aspirin delayed-release 81 mg tablet TAKE 1 TABLET BY MOUTH EVERY DAY 90 Tablet 2    acetaminophen (TYLENOL) 325 mg tablet Take  by mouth every six (6) hours as needed for Pain. No Known Allergies  Past Medical History:   Diagnosis Date    Elevated blood pressure     Hypercholesterolemia     IBS (irritable bowel syndrome)      Past Surgical History:   Procedure Laterality Date    HX CHOLECYSTECTOMY      HX HYSTERECTOMY       Family History   Problem Relation Age of Onset    Hypertension Mother     Diabetes Mother     Elevated Lipids Mother     Hypertension Father     Elevated Lipids Father     Hypertension Sister     Diabetes Sister     Hypertension Brother     Hypertension Sister     Elevated Lipids Sister      Social History     Tobacco Use    Smoking status: Never    Smokeless tobacco: Never   Substance Use Topics    Alcohol use: No        ROS:  Feeling well. No dyspnea or chest pain on exertion.   No abdominal pain, change in bowel habits, black or bloody stools. No urinary tract symptoms. GYN ROS: no breast pain or new or enlarging lumps on self exam. PMH: subtotal hysterectomy. No neurological complaints. Hypertension:  Visit Vitals  BP (!) 160/94   Pulse 64   Temp 97.8 °F (36.6 °C) (Temporal)   Resp 18   Ht 5' 5\" (1.651 m)   Wt 180 lb (81.6 kg)   SpO2 97%   BMI 29.95 kg/m²      Patient reports taking medications as instructed. no, has not had irbasartan for several days, the pharmacy is backed up 5 days on medication refills   Medication side effects noted. no  Headache upon wakening. no   Home BP monitoring: Does not check  Do you experience chest pain/pressure or SOB with exertion? no  Maintain a low Sodium diet? yes  Key CAD CHF Meds               irbesartan (AVAPRO) 300 mg tablet (Taking) TAKE 1 TABLET BY MOUTH EVERY DAY AT NIGHT    pravastatin (PRAVACHOL) 80 mg tablet (Taking) TAKE 1 TABLET BY MOUTH EVERY DAY    verapamil ER (CALAN-SR) 120 mg tablet (Taking) Take 1 Tablet by mouth nightly. aspirin delayed-release 81 mg tablet (Taking) TAKE 1 TABLET BY MOUTH EVERY DAY          BUN   Date Value Ref Range Status   07/22/2021 18 7.0 - 18 MG/DL Final     Creatinine   Date Value Ref Range Status   07/22/2021 0.78 0.6 - 1.3 MG/DL Final     GFR est AA   Date Value Ref Range Status   07/22/2021 >60 >60 ml/min/1.73m2 Final     Potassium   Date Value Ref Range Status   07/22/2021 3.9 3.5 - 5.5 mmol/L Final     HLD:  Has been compliant with meds  Yes  Compliant with low-fat diet. most of the time    Denies myalgias or other side effects. yes  The 10-year ASCVD risk score (Smita DK, et al., 2019) is: 8.5%    Cholesterol, total   Date Value Ref Range Status   07/22/2021 212 (H) <200 MG/DL Final     Triglyceride   Date Value Ref Range Status   07/22/2021 75 <150 MG/DL Final     Comment:     The drugs N-acetylcysteine (NAC) and  Metamiszole have been found to cause falsely  low results in this chemical assay.  Please  be sure to submit blood samples obtained  BEFORE administration of either of these  drugs to assure correct results. HDL Cholesterol   Date Value Ref Range Status   07/22/2021 73 (H) 40 - 60 MG/DL Final     LDL, calculated   Date Value Ref Range Status   07/22/2021 124 (H) 0 - 100 MG/DL Final   ]  Key Antihyperlipidemia Meds               pravastatin (PRAVACHOL) 80 mg tablet (Taking) TAKE 1 TABLET BY MOUTH EVERY DAY                     Objective:   Visit Vitals  BP (!) 160/94   Pulse 64   Temp 97.8 °F (36.6 °C) (Temporal)   Resp 18   Ht 5' 5\" (1.651 m)   Wt 180 lb (81.6 kg)   SpO2 97%   BMI 29.95 kg/m²     The patient appears well, alert, oriented x 3, in no distress. ENT normal.  Neck supple. No adenopathy or thyromegaly. EDDIE. Lungs are clear, good air entry, no wheezes, rhonchi or rales. S1 and S2 normal, no murmurs, regular rate and rhythm. Abdomen soft without tenderness, guarding, mass or organomegaly. Extremities show no edema, normal peripheral pulses. Neurological is normal, no focal findings. BREAST EXAM: breasts appear normal, no suspicious masses, no skin or nipple changes or axillary nodes    PELVIC EXAM: VULVA: normal appearing vulva with no masses, tenderness or lesions, VAGINA: normal appearing vagina with normal color and discharge, no lesions, CERVIX: normal appearing cervix without discharge or lesions, PAP: Pap smear done today, saw liquid based cytology, HPV test, exam chaperoned by yaya Marino MA    Assessment/Plan:   well woman  mammogram  pap smear  return annually or prn  Diagnoses and all orders for this visit:    1. Well woman exam with routine gynecological exam  Comments:  [V72.31]  Completed today no abnormal findings  2. Primary hypertension  -     irbesartan (AVAPRO) 300 mg tablet; TAKE 1 TABLET BY MOUTH EVERY DAY AT NIGHT  -     verapamil ER (CALAN-SR) 120 mg tablet; Take 1 Tablet by mouth nightly. -     METABOLIC PANEL, COMPREHENSIVE;  Future  Endorses medication compliance, refills provided, follow-up labs today to check hepatic and renal function, blood pressure elevated as she has not had her irbesartan in several days, will have her return in 2 weeks for blood pressure recheck  3. Mixed hyperlipidemia  -     pravastatin (PRAVACHOL) 80 mg tablet; TAKE 1 TABLET BY MOUTH EVERY DAY  -     LIPID PANEL; Future  Endorses medication compliance, refill provided, follow-up labs today  4. Encounter for screening mammogram for malignant neoplasm of breast  -     GUANAKO MAMMO BI SCREENING INCL CAD; Future  Health maintenance needs addressed  5. Screening for malignant neoplasm of cervix  -     PAP IG, APTIMA HPV AND RFX 16/18,45 ([id number]); Future  Health maintenance needs addressed  6. Encounter for breast cancer screening using non-mammogram modality  Manual breast exam performed, no abnormalities found  7. Screening examination for STD (sexually transmitted disease)  -     NUSWAB VG PLUS+MYCOPLASMAS,JOHNNY; Future  STD screening performed per patient's request  Follow-up and Dispositions    Return in about 2 weeks (around 11/8/2022) for NV, b/pcheck, AND, 3month, HTN, HLD, 15 min, office only. Demond Phillips, [facility]  [address]  [address] [place] [address] Rd.   Rosalino Evangelista

## 2022-10-31 LAB
A VAGINAE DNA VAG QL NAA+PROBE: ABNORMAL SCORE
BVAB2 DNA VAG QL NAA+PROBE: ABNORMAL SCORE
C ALBICANS DNA VAG QL NAA+PROBE: NEGATIVE
C GLABRATA DNA VAG QL NAA+PROBE: NEGATIVE
C TRACH RRNA SPEC QL NAA+PROBE: NEGATIVE
M GENITALIUM DNA SPEC QL NAA+PROBE: NEGATIVE
M HOMINIS DNA SPEC QL NAA+PROBE: NEGATIVE
MEGA1 DNA VAG QL NAA+PROBE: ABNORMAL SCORE
N GONORRHOEA RRNA SPEC QL NAA+PROBE: NEGATIVE
SPECIMEN SOURCE: ABNORMAL
T VAGINALIS RRNA SPEC QL NAA+PROBE: NEGATIVE
UREAPLASMA DNA SPEC QL NAA+PROBE: POSITIVE

## 2022-11-02 NOTE — PROGRESS NOTES
My chart messsage  Pap is negative for cancer and HPV, repeat due in 5 years.  Please call if you have any questions

## 2022-11-04 ENCOUNTER — TELEPHONE (OUTPATIENT)
Dept: FAMILY MEDICINE CLINIC | Age: 57
End: 2022-11-04

## 2022-11-04 DIAGNOSIS — A49.3 NONGONOCOCCAL URETHRITIS DUE TO UREAPLASMA UREALYTICUM: Primary | ICD-10-CM

## 2022-11-04 DIAGNOSIS — N34.1 NONGONOCOCCAL URETHRITIS DUE TO UREAPLASMA UREALYTICUM: Primary | ICD-10-CM

## 2022-11-04 RX ORDER — DOXYCYCLINE 100 MG/1
100 CAPSULE ORAL 2 TIMES DAILY
Qty: 14 CAPSULE | Refills: 0 | Status: SHIPPED | OUTPATIENT
Start: 2022-11-04 | End: 2022-11-11

## 2022-11-04 NOTE — TELEPHONE ENCOUNTER
Called to review lab results. Advised she is positive for ureaplama vaginally and the treatment is doxycycline.   Patient verbalized understanding and is in agreement with this plan of care  Rx sent

## 2023-01-26 DIAGNOSIS — E78.2 MIXED HYPERLIPIDEMIA: ICD-10-CM

## 2023-01-26 DIAGNOSIS — I10 PRIMARY HYPERTENSION: ICD-10-CM

## 2023-01-27 RX ORDER — IRBESARTAN 300 MG/1
TABLET ORAL
Qty: 90 TABLET | Refills: 1 | Status: SHIPPED | OUTPATIENT
Start: 2023-01-27

## 2023-01-27 RX ORDER — PRAVASTATIN SODIUM 80 MG/1
TABLET ORAL
Qty: 90 TABLET | Refills: 1 | Status: SHIPPED | OUTPATIENT
Start: 2023-01-27

## 2023-01-27 RX ORDER — ASPIRIN 81 MG/1
TABLET ORAL
Qty: 90 TABLET | Refills: 2 | Status: SHIPPED | OUTPATIENT
Start: 2023-01-27

## 2023-02-17 ENCOUNTER — TELEPHONE (OUTPATIENT)
Facility: CLINIC | Age: 58
End: 2023-02-17

## 2023-02-17 DIAGNOSIS — I10 PRIMARY HYPERTENSION: Primary | ICD-10-CM

## 2023-02-17 NOTE — TELEPHONE ENCOUNTER
Phone call from patient stating she requested refill for Verapamil and she really  needs it. Please call or send her meds to the pharmacy.

## 2023-02-21 ENCOUNTER — TELEPHONE (OUTPATIENT)
Facility: CLINIC | Age: 58
End: 2023-02-21

## 2023-02-21 DIAGNOSIS — I10 PRIMARY HYPERTENSION: ICD-10-CM

## 2023-02-21 NOTE — TELEPHONE ENCOUNTER
Pt states pharmacy has sent over a fax in regards to pt medication Verapamil. Pt has been out of this medication for a couple days now. Please advise. Pt would also like a call back to confirm when this has been done.

## 2023-02-22 DIAGNOSIS — I10 PRIMARY HYPERTENSION: ICD-10-CM

## 2023-02-28 ENCOUNTER — OFFICE VISIT (OUTPATIENT)
Facility: CLINIC | Age: 58
End: 2023-02-28
Payer: COMMERCIAL

## 2023-02-28 VITALS
RESPIRATION RATE: 16 BRPM | SYSTOLIC BLOOD PRESSURE: 132 MMHG | WEIGHT: 184 LBS | BODY MASS INDEX: 30.66 KG/M2 | HEART RATE: 56 BPM | OXYGEN SATURATION: 98 % | DIASTOLIC BLOOD PRESSURE: 82 MMHG | TEMPERATURE: 98.3 F | HEIGHT: 65 IN

## 2023-02-28 DIAGNOSIS — E78.2 MIXED HYPERLIPIDEMIA: ICD-10-CM

## 2023-02-28 DIAGNOSIS — R42 DIZZINESS: ICD-10-CM

## 2023-02-28 DIAGNOSIS — K58.0 IRRITABLE BOWEL SYNDROME WITH DIARRHEA: ICD-10-CM

## 2023-02-28 DIAGNOSIS — Z23 NEED FOR IMMUNIZATION AGAINST INFLUENZA: ICD-10-CM

## 2023-02-28 DIAGNOSIS — I10 PRIMARY HYPERTENSION: Primary | ICD-10-CM

## 2023-02-28 PROCEDURE — 99214 OFFICE O/P EST MOD 30 MIN: CPT | Performed by: NURSE PRACTITIONER

## 2023-02-28 PROCEDURE — 3079F DIAST BP 80-89 MM HG: CPT | Performed by: NURSE PRACTITIONER

## 2023-02-28 PROCEDURE — 90471 IMMUNIZATION ADMIN: CPT | Performed by: NURSE PRACTITIONER

## 2023-02-28 PROCEDURE — 3075F SYST BP GE 130 - 139MM HG: CPT | Performed by: NURSE PRACTITIONER

## 2023-02-28 PROCEDURE — 90686 IIV4 VACC NO PRSV 0.5 ML IM: CPT | Performed by: NURSE PRACTITIONER

## 2023-02-28 RX ORDER — PRAVASTATIN SODIUM 80 MG/1
TABLET ORAL
Qty: 90 TABLET | Refills: 1 | Status: SHIPPED | OUTPATIENT
Start: 2023-02-28

## 2023-02-28 RX ORDER — DICYCLOMINE HYDROCHLORIDE 10 MG/1
10 CAPSULE ORAL 3 TIMES DAILY
Qty: 270 CAPSULE | Refills: 1 | Status: SHIPPED | OUTPATIENT
Start: 2023-02-28

## 2023-02-28 RX ORDER — IRBESARTAN 300 MG/1
300 TABLET ORAL DAILY
Qty: 90 TABLET | Refills: 1 | Status: SHIPPED | OUTPATIENT
Start: 2023-02-28

## 2023-02-28 NOTE — PROGRESS NOTES
[address] [place] [address]               [phone]      Geovanny Rene is a 56 y.o. female and presents with Hypertension and Other (Head feels \"swimmy\")       Assessment/Plan:    1. Primary hypertension  -     Comprehensive Metabolic Panel; Future  -     verapamil (CALAN SR) 120 MG extended release tablet; Take 1 tablet by mouth daily, Disp-90 tablet, R-1Normal  -     irbesartan (AVAPRO) 300 MG tablet; Take 1 tablet by mouth daily, Disp-90 tablet, R-1Normal  Endorses medication compliance, Refill provided, Follow up labs prior to next visit, and Blood pressure stable, continue verapamil and irbesartan at same dose  2. Mixed hyperlipidemia  -     Lipid Panel; Future  -     pravastatin (PRAVACHOL) 80 MG tablet; TAKE 1 TABLET BY MOUTH EVERY DAY, Disp-90 tablet, R-1Normal  Endorses medication compliance, Refill provided, Follow up labs prior to next visit, and Denies abdominal pain or symptoms of myalgia  3. Dizziness  Continue to monitor dizziness, if problem persists or worsens consider CT brain  4. Irritable bowel syndrome with diarrhea  -     dicyclomine (BENTYL) 10 MG capsule; Take 1 capsule by mouth 3 times daily, Disp-270 capsule, R-1Normal  5. Need for immunization against influenza  -     Influenza, FLULAVAL, (age 10 mo+), IM, Preservative Free, 0.5mL       Labs today and prior  Follow up and disposition:   Return in about 4 months (around 6/28/2023) for HTN, HLD, 15min, office, w/labsprior.       Health Maintenance:   Health Maintenance   Topic Date Due    HIV screen  Never done    Hepatitis C screen  Never done    Shingles vaccine (1 of 2) Never done    DTaP/Tdap/Td vaccine (2 - Td or Tdap) 05/26/2021    COVID-19 Vaccine (4 - Booster for Pfizer series) 02/03/2022    Lipids  10/25/2023    Depression Screen  10/25/2023    Breast cancer screen  02/09/2025    Colorectal Cancer Screen  03/08/2026    Flu vaccine  Completed    Hepatitis A vaccine  Aged Out    Hib vaccine Aged Out    Meningococcal (ACWY) vaccine  Aged Out    Pneumococcal 0-64 years Vaccine  Aged Out        Subjective:    Labs obtained prior to visit? No  Reviewed with patient? N/A    Head feels woozy with a slight headache  States her blood pressure has been up a little, 145/88  Onset: about a week ago  Did go without her verapamil for about a week and then the symptoms started  Feels fatigued also  Denies nasal congestion, cough, ear symptoms, eye symptoms, fever  States at the time of this visit she is not having the symptoms  States she has been having some insomnia lately on and off  Has never had a problem with environmental allergies  Denies signs and symptoms of vertigo        Hypertension:  /82   Pulse 56   Temp 98.3 °F (36.8 °C) (Temporal)   Resp 16   Ht 5' 5\" (1.651 m)   Wt 184 lb (83.5 kg)   SpO2 98%   BMI 30.62 kg/m²    Patient reports taking medications as instructed. Yes   Medication side effects noted no  Headache upon wakening. no   Home BP monitoring: yes, 145/88  Do you experience chest pain/pressure or SOB with exertion? no  Maintain a low Sodium diet? Yes  Lab Results   Component Value Date/Time    BUN 13 10/25/2022 10:04 AM    CREATININE 0.88 10/25/2022 10:04 AM    K 3.9 10/25/2022 10:04 AM     Key Anti-Hypertensive Meds            verapamil (CALAN SR) 120 MG extended release tablet (Taking)    Sig - Route: Take 1 tablet by mouth daily - Oral    irbesartan (AVAPRO) 300 MG tablet (Taking)    Sig - Route: Take 1 tablet by mouth daily - Oral         HLD:  Has been compliant with meds  Yes  Compliant with low-fat diet. most of the time    Denies myalgias or other side effects.  yes  The 10-year ASCVD risk score (Lm DK, et al., 2019) is: 4%    Lab Results   Component Value Date/Time    TRIG 87 10/25/2022 10:04 AM    CHOL 206 10/25/2022 10:04 AM    LDLCALC 110.6 10/25/2022 10:04 AM    HDL 78 10/25/2022 10:04 AM     Key Hyperlipidemia Meds            pravastatin (PRAVACHOL) 80 MG tablet (Taking)    Sig: TAKE 1 TABLET BY MOUTH EVERY DAY             ROS:     Review of Systems  As stated in HPI, otherwise all others negative. The problem list was updated as a part of today's visit. Patient Active Problem List   Diagnosis    Mild cognitive disorder    Family history of diabetes mellitus in mother    Primary hypertension    Irritable bowel syndrome with diarrhea    Mixed hyperlipidemia       The PSH, FH were reviewed. SH:  Social History     Tobacco Use    Smoking status: Never    Smokeless tobacco: Never   Substance Use Topics    Alcohol use: No    Drug use: No       Medications/Allergies:  Current Outpatient Medications on File Prior to Visit   Medication Sig Dispense Refill    acetaminophen (TYLENOL) 325 MG tablet Take by mouth every 6 hours as needed      aspirin 81 MG EC tablet TAKE 1 TABLET BY MOUTH EVERY DAY       No current facility-administered medications on file prior to visit. No Known Allergies    Objective:  /82   Pulse 56   Temp 98.3 °F (36.8 °C) (Temporal)   Resp 16   Ht 5' 5\" (1.651 m)   Wt 184 lb (83.5 kg)   SpO2 98%   BMI 30.62 kg/m²  Body mass index is 30.62 kg/m². Physical assessment  Physical Exam            I have discussed the diagnosis with the patient and the intended plan as seen in the above orders. The patient has received an After-Visit Summary and questions were answered concerning future plans. An After Visit Summary was printed and given to the patient. All diagnosis have been discussed with the patient and all of the patient's questions have been answered. Aleta Moody, [facility]  [address] [facility]   [address] [facility] [address]  [address]

## 2023-02-28 NOTE — PROGRESS NOTES
1. \"Have you been to the ER, urgent care clinic since your last visit? Hospitalized since your last visit? \" No    2. \"Have you seen or consulted any other health care providers outside of the [address] [place] [address] since your last visit? \" No    3. For patients aged 39-70: Has the patient had a colonoscopy / FIT/ Cologuard? Yes no care gap present      If the patient is female:    4. For patients aged 41-77: Has the patient had a mammogram within the past 2 years? Yes - no Care Gap present    5. For patients aged 21-65: Has the patient had a pap smear?  No    Health Maintenance Due   Topic Date Due    HIV screen  Never done    Hepatitis C screen  Never done    Shingles vaccine (1 of 2) Never done    DTaP/Tdap/Td vaccine (2 - Td or Tdap) 05/26/2021    COVID-19 Vaccine (4 - Booster for Pfizer series) 02/03/2022    Flu vaccine (1) 08/01/2022

## 2023-03-01 LAB
ALBUMIN SERPL-MCNC: 4.1 G/DL (ref 3.8–4.9)
ALBUMIN/GLOB SERPL: 1.5 {RATIO} (ref 1.2–2.2)
ALP SERPL-CCNC: 86 IU/L (ref 44–121)
ALT SERPL-CCNC: 11 IU/L
AST SERPL-CCNC: 18 IU/L (ref 0–40)
BILIRUB SERPL-MCNC: 0.3 MG/DL (ref 0–1.2)
BUN SERPL-MCNC: 13 MG/DL
BUN/CREAT SERPL: 16
CALCIUM SERPL-MCNC: 9.3 MG/DL
CHLORIDE SERPL-SCNC: 105 MMOL/L (ref 96–106)
CHOLEST SERPL-MCNC: 196 MG/DL
CO2 SERPL-SCNC: 24 MMOL/L (ref 20–29)
CREAT SERPL-MCNC: 0.8 MG/DL
EGFRCR SERPLBLD CKD-EPI 2021: 87 ML/MIN/1.73
GLOBULIN SER CALC-MCNC: 2.7 G/DL (ref 1.5–4.5)
GLUCOSE SERPL-MCNC: 74 MG/DL (ref 70–99)
HDLC SERPL-MCNC: 76 MG/DL
IMP & REVIEW OF LAB RESULTS: NORMAL
LDLC SERPL CALC-MCNC: 111 MG/DL
POTASSIUM SERPL-SCNC: 3.6 MMOL/L (ref 3.5–5.2)
PROT SERPL-MCNC: 6.8 G/DL (ref 6–8.5)
SODIUM SERPL-SCNC: 142 MMOL/L (ref 134–144)
SPECIMEN STATUS REPORT, ROLRST: NORMAL
TRIGL SERPL-MCNC: 50 MG/DL (ref ?–150)
VLDLC SERPL CALC-MCNC: 9 MG/DL (ref 5–40)

## 2023-03-02 DIAGNOSIS — I10 PRIMARY HYPERTENSION: ICD-10-CM

## 2023-03-13 RX ORDER — SALICYLIC ACID 40 %
ADHESIVE PATCH, MEDICATED TOPICAL
Qty: 90 TABLET | Refills: 2 | OUTPATIENT
Start: 2023-03-13

## 2023-06-23 ENCOUNTER — HOSPITAL ENCOUNTER (OUTPATIENT)
Facility: HOSPITAL | Age: 58
Setting detail: SPECIMEN
End: 2023-06-23
Payer: COMMERCIAL

## 2023-06-23 LAB
ALBUMIN SERPL-MCNC: 3.7 G/DL (ref 3.4–5)
ALBUMIN/GLOB SERPL: 1 (ref 0.8–1.7)
ALP SERPL-CCNC: 86 U/L (ref 45–117)
ALT SERPL-CCNC: 17 U/L (ref 13–56)
ANION GAP SERPL CALC-SCNC: 5 MMOL/L (ref 3–18)
AST SERPL-CCNC: 17 U/L (ref 10–38)
BILIRUB SERPL-MCNC: 0.5 MG/DL (ref 0.2–1)
BUN SERPL-MCNC: 14 MG/DL (ref 7–18)
BUN/CREAT SERPL: 16 (ref 12–20)
CALCIUM SERPL-MCNC: 9.1 MG/DL (ref 8.5–10.1)
CHLORIDE SERPL-SCNC: 109 MMOL/L (ref 100–111)
CHOLEST SERPL-MCNC: 203 MG/DL
CO2 SERPL-SCNC: 28 MMOL/L (ref 21–32)
CREAT SERPL-MCNC: 0.88 MG/DL (ref 0.6–1.3)
GLOBULIN SER CALC-MCNC: 3.6 G/DL (ref 2–4)
GLUCOSE SERPL-MCNC: 87 MG/DL (ref 74–99)
HDLC SERPL-MCNC: 76 MG/DL (ref 40–60)
HDLC SERPL: 2.7 (ref 0–5)
LDLC SERPL CALC-MCNC: 114.8 MG/DL (ref 0–100)
LIPID PANEL: ABNORMAL
POTASSIUM SERPL-SCNC: 3.7 MMOL/L (ref 3.5–5.5)
PROT SERPL-MCNC: 7.3 G/DL (ref 6.4–8.2)
SODIUM SERPL-SCNC: 142 MMOL/L (ref 136–145)
TRIGL SERPL-MCNC: 61 MG/DL
VLDLC SERPL CALC-MCNC: 12.2 MG/DL

## 2023-06-23 PROCEDURE — 80061 LIPID PANEL: CPT

## 2023-06-23 PROCEDURE — 36415 COLL VENOUS BLD VENIPUNCTURE: CPT

## 2023-06-23 PROCEDURE — 80053 COMPREHEN METABOLIC PANEL: CPT

## 2023-09-05 DIAGNOSIS — K58.0 IRRITABLE BOWEL SYNDROME WITH DIARRHEA: ICD-10-CM

## 2023-09-05 RX ORDER — DICYCLOMINE HYDROCHLORIDE 10 MG/1
CAPSULE ORAL
Qty: 270 CAPSULE | Refills: 1 | OUTPATIENT
Start: 2023-09-05

## 2023-10-02 DIAGNOSIS — I10 PRIMARY HYPERTENSION: ICD-10-CM

## 2023-10-02 DIAGNOSIS — E78.2 MIXED HYPERLIPIDEMIA: ICD-10-CM

## 2023-10-02 DIAGNOSIS — K58.0 IRRITABLE BOWEL SYNDROME WITH DIARRHEA: ICD-10-CM

## 2023-10-03 RX ORDER — DICYCLOMINE HYDROCHLORIDE 10 MG/1
10 CAPSULE ORAL 3 TIMES DAILY
Qty: 270 CAPSULE | Refills: 1 | OUTPATIENT
Start: 2023-10-03

## 2023-10-06 RX ORDER — PRAVASTATIN SODIUM 80 MG/1
TABLET ORAL
Qty: 30 TABLET | Refills: 0 | Status: SHIPPED | OUTPATIENT
Start: 2023-10-06

## 2023-10-06 RX ORDER — IRBESARTAN 300 MG/1
300 TABLET ORAL DAILY
Qty: 30 TABLET | Refills: 0 | Status: SHIPPED | OUTPATIENT
Start: 2023-10-06

## 2023-10-06 RX ORDER — DICYCLOMINE HYDROCHLORIDE 10 MG/1
10 CAPSULE ORAL 3 TIMES DAILY
Qty: 90 CAPSULE | Refills: 0 | Status: SHIPPED | OUTPATIENT
Start: 2023-10-06 | End: 2023-11-22

## 2023-10-06 NOTE — TELEPHONE ENCOUNTER
Message  Received: [facility] Clinical Staff  Subject: Refill Request     QUESTIONS   Name of Medication? verapamil (CALAN SR) 120 MG extended release tablet   Patient-reported dosage and instructions? one nightly   How many days do you have left? 6   [id number]   Pharmacy phone number (if available)? [phone]   Additional Information for Provider? Patient's last OV was 2/28/23 and   patient is now scheduled for 10/20/23 which was the first available. Patient is free on 10/13 if you want to try to get her in sooner or   (frankly) ANY day sooner :)----------------------------------------------------------------------------Name of Medication? irbesartan (AVAPRO) 300 MG tablet   Patient-reported dosage and instructions? once nightly   How many days do you have left? 6   [id number]   Pharmacy phone number (if available)? [phone]   ---------------------------------------------------------------------------   --------------,   Name of Medication? pravastatin (PRAVACHOL) 80 MG tablet   Patient-reported dosage and instructions? once nightly   How many days do you have left? 6   [id number]   Pharmacy phone number (if available)? [phone]   ---------------------------------------------------------------------------   --------------,   Name of Medication? dicyclomine (BENTYL) 10 MG capsule   Patient-reported dosage and instructions? 3x times per day   How many days do you have left? 4   [id number]   Pharmacy phone number (if available)? [phone]   Additional Information for Provider?  Patient says sometimes she takes up   to 4x per day depending on symptoms (before meals)   ---------------------------------------------------------------------------   --------------   CALL BACK INFO   What is the best way for the office to

## 2023-10-09 ENCOUNTER — OFFICE VISIT (OUTPATIENT)
Facility: CLINIC | Age: 58
End: 2023-10-09
Payer: COMMERCIAL

## 2023-10-09 VITALS
OXYGEN SATURATION: 99 % | RESPIRATION RATE: 16 BRPM | HEIGHT: 65 IN | BODY MASS INDEX: 30.16 KG/M2 | WEIGHT: 181 LBS | DIASTOLIC BLOOD PRESSURE: 86 MMHG | HEART RATE: 60 BPM | SYSTOLIC BLOOD PRESSURE: 138 MMHG | TEMPERATURE: 97.7 F

## 2023-10-09 DIAGNOSIS — I10 PRIMARY HYPERTENSION: Primary | ICD-10-CM

## 2023-10-09 DIAGNOSIS — E78.2 MIXED HYPERLIPIDEMIA: ICD-10-CM

## 2023-10-09 DIAGNOSIS — Z23 ENCOUNTER FOR IMMUNIZATION: ICD-10-CM

## 2023-10-09 DIAGNOSIS — I83.899 SYMPTOMATIC SPIDER VARICOSE VEIN: ICD-10-CM

## 2023-10-09 PROCEDURE — 3079F DIAST BP 80-89 MM HG: CPT | Performed by: NURSE PRACTITIONER

## 2023-10-09 PROCEDURE — 90471 IMMUNIZATION ADMIN: CPT | Performed by: NURSE PRACTITIONER

## 2023-10-09 PROCEDURE — 3075F SYST BP GE 130 - 139MM HG: CPT | Performed by: NURSE PRACTITIONER

## 2023-10-09 PROCEDURE — 99214 OFFICE O/P EST MOD 30 MIN: CPT | Performed by: NURSE PRACTITIONER

## 2023-10-09 PROCEDURE — 90674 CCIIV4 VAC NO PRSV 0.5 ML IM: CPT | Performed by: NURSE PRACTITIONER

## 2023-10-09 SDOH — ECONOMIC STABILITY: FOOD INSECURITY: WITHIN THE PAST 12 MONTHS, YOU WORRIED THAT YOUR FOOD WOULD RUN OUT BEFORE YOU GOT MONEY TO BUY MORE.: NEVER TRUE

## 2023-10-09 SDOH — ECONOMIC STABILITY: HOUSING INSECURITY
IN THE LAST 12 MONTHS, WAS THERE A TIME WHEN YOU DID NOT HAVE A STEADY PLACE TO SLEEP OR SLEPT IN A SHELTER (INCLUDING NOW)?: NO

## 2023-10-09 SDOH — ECONOMIC STABILITY: FOOD INSECURITY: WITHIN THE PAST 12 MONTHS, THE FOOD YOU BOUGHT JUST DIDN'T LAST AND YOU DIDN'T HAVE MONEY TO GET MORE.: NEVER TRUE

## 2023-10-09 SDOH — ECONOMIC STABILITY: INCOME INSECURITY: HOW HARD IS IT FOR YOU TO PAY FOR THE VERY BASICS LIKE FOOD, HOUSING, MEDICAL CARE, AND HEATING?: NOT HARD AT ALL

## 2023-10-09 ASSESSMENT — PATIENT HEALTH QUESTIONNAIRE - PHQ9
SUM OF ALL RESPONSES TO PHQ9 QUESTIONS 1 & 2: 0
SUM OF ALL RESPONSES TO PHQ QUESTIONS 1-9: 0
1. LITTLE INTEREST OR PLEASURE IN DOING THINGS: 0
SUM OF ALL RESPONSES TO PHQ QUESTIONS 1-9: 0
2. FEELING DOWN, DEPRESSED OR HOPELESS: 0

## 2023-10-09 NOTE — PROGRESS NOTES
Lane Chapa presents today for   Chief Complaint   Patient presents with    Numbness     Right knee       Is someone accompanying this pt? No    Is the patient using any DME equipment during OV? No    Depression Screening:      10/9/2023    10:35 AM   PHQ-9    Little interest or pleasure in doing things 0   Feeling down, depressed, or hopeless 0   PHQ-2 Score 0   PHQ-9 Total Score 0               Health Maintenance reviewed and discussed and ordered per Provider. Health Maintenance Due   Topic Date Due    Hepatitis B vaccine (1 of 3 - 3-dose series) Never done    HIV screen  Never done    Hepatitis C screen  Never done    Shingles vaccine (1 of 2) Never done    DTaP/Tdap/Td vaccine (2 - Td or Tdap) 05/26/2021    COVID-19 Vaccine (4 - Pfizer series) 02/03/2022    Flu vaccine (1) 08/01/2023    Depression Screen  10/25/2023   . 1. \"Have you been to the ER, urgent care clinic since your last visit? Hospitalized since your last visit? \" No    2. \"Have you seen or consulted any other health care providers outside of the [address] [place] [address] since your last visit? \" No    3. For patients over 45: Has the patient had a colonoscopy? Yes - no Care Gap present     If the patient is female:    4. For patients over 40: Has the patient had a mammogram? Yes - no Care Gap present    5. For patients over 21: Has the patient had a pap smear?  Yes - no Care Gap present

## 2023-10-11 DIAGNOSIS — E78.2 MIXED HYPERLIPIDEMIA: ICD-10-CM

## 2023-10-11 DIAGNOSIS — K58.0 IRRITABLE BOWEL SYNDROME WITH DIARRHEA: ICD-10-CM

## 2023-10-11 DIAGNOSIS — I10 PRIMARY HYPERTENSION: ICD-10-CM

## 2023-10-11 RX ORDER — PRAVASTATIN SODIUM 80 MG/1
TABLET ORAL
Qty: 30 TABLET | Refills: 0 | OUTPATIENT
Start: 2023-10-11

## 2023-10-11 RX ORDER — DICYCLOMINE HYDROCHLORIDE 10 MG/1
10 CAPSULE ORAL 3 TIMES DAILY
Qty: 90 CAPSULE | Refills: 0 | OUTPATIENT
Start: 2023-10-11

## 2023-10-25 ENCOUNTER — NURSE ONLY (OUTPATIENT)
Facility: CLINIC | Age: 58
End: 2023-10-25

## 2023-10-25 ENCOUNTER — TELEPHONE (OUTPATIENT)
Facility: CLINIC | Age: 58
End: 2023-10-25

## 2023-10-25 VITALS — SYSTOLIC BLOOD PRESSURE: 147 MMHG | DIASTOLIC BLOOD PRESSURE: 81 MMHG

## 2023-10-25 NOTE — TELEPHONE ENCOUNTER
Pt calling in regards to dizziness, balance is off and has been going on for the past two weeks now. Pt requesting to be seen today, no appt available.

## 2023-10-26 ENCOUNTER — OFFICE VISIT (OUTPATIENT)
Facility: CLINIC | Age: 58
End: 2023-10-26
Payer: COMMERCIAL

## 2023-10-26 VITALS
DIASTOLIC BLOOD PRESSURE: 88 MMHG | WEIGHT: 182 LBS | SYSTOLIC BLOOD PRESSURE: 157 MMHG | RESPIRATION RATE: 18 BRPM | HEART RATE: 52 BPM | OXYGEN SATURATION: 100 % | HEIGHT: 65 IN | TEMPERATURE: 98.2 F | BODY MASS INDEX: 30.32 KG/M2

## 2023-10-26 DIAGNOSIS — Z86.73 HISTORY OF TIA (TRANSIENT ISCHEMIC ATTACK): ICD-10-CM

## 2023-10-26 DIAGNOSIS — R42 DIZZINESS: Primary | ICD-10-CM

## 2023-10-26 PROCEDURE — 3079F DIAST BP 80-89 MM HG: CPT | Performed by: NURSE PRACTITIONER

## 2023-10-26 PROCEDURE — 99214 OFFICE O/P EST MOD 30 MIN: CPT | Performed by: NURSE PRACTITIONER

## 2023-10-26 PROCEDURE — 3077F SYST BP >= 140 MM HG: CPT | Performed by: NURSE PRACTITIONER

## 2023-10-26 SDOH — ECONOMIC STABILITY: FOOD INSECURITY: WITHIN THE PAST 12 MONTHS, THE FOOD YOU BOUGHT JUST DIDN'T LAST AND YOU DIDN'T HAVE MONEY TO GET MORE.: NEVER TRUE

## 2023-10-26 SDOH — ECONOMIC STABILITY: FOOD INSECURITY: WITHIN THE PAST 12 MONTHS, YOU WORRIED THAT YOUR FOOD WOULD RUN OUT BEFORE YOU GOT MONEY TO BUY MORE.: NEVER TRUE

## 2023-10-26 SDOH — ECONOMIC STABILITY: INCOME INSECURITY: HOW HARD IS IT FOR YOU TO PAY FOR THE VERY BASICS LIKE FOOD, HOUSING, MEDICAL CARE, AND HEATING?: NOT HARD AT ALL

## 2023-10-26 ASSESSMENT — PATIENT HEALTH QUESTIONNAIRE - PHQ9
SUM OF ALL RESPONSES TO PHQ9 QUESTIONS 1 & 2: 0
SUM OF ALL RESPONSES TO PHQ QUESTIONS 1-9: 0
1. LITTLE INTEREST OR PLEASURE IN DOING THINGS: 0
2. FEELING DOWN, DEPRESSED OR HOPELESS: 0

## 2023-10-26 ASSESSMENT — ANXIETY QUESTIONNAIRES
5. BEING SO RESTLESS THAT IT IS HARD TO SIT STILL: 0
3. WORRYING TOO MUCH ABOUT DIFFERENT THINGS: 0
1. FEELING NERVOUS, ANXIOUS, OR ON EDGE: 0
2. NOT BEING ABLE TO STOP OR CONTROL WORRYING: 0
IF YOU CHECKED OFF ANY PROBLEMS ON THIS QUESTIONNAIRE, HOW DIFFICULT HAVE THESE PROBLEMS MADE IT FOR YOU TO DO YOUR WORK, TAKE CARE OF THINGS AT HOME, OR GET ALONG WITH OTHER PEOPLE: NOT DIFFICULT AT ALL
6. BECOMING EASILY ANNOYED OR IRRITABLE: 0
7. FEELING AFRAID AS IF SOMETHING AWFUL MIGHT HAPPEN: 0
GAD7 TOTAL SCORE: 0
4. TROUBLE RELAXING: 0

## 2023-11-02 ENCOUNTER — HOSPITAL ENCOUNTER (OUTPATIENT)
Facility: HOSPITAL | Age: 58
Discharge: HOME | End: 2023-11-02
Payer: COMMERCIAL

## 2023-11-02 DIAGNOSIS — R42 DIZZINESS: ICD-10-CM

## 2023-11-02 LAB — CREAT UR-MCNC: 1.1 MG/DL (ref 0.6–1.3)

## 2023-11-02 PROCEDURE — 70470 CT HEAD/BRAIN W/O & W/DYE: CPT

## 2023-11-02 PROCEDURE — 6360000004 HC RX CONTRAST MEDICATION: Performed by: NURSE PRACTITIONER

## 2023-11-02 PROCEDURE — 82565 ASSAY OF CREATININE: CPT

## 2023-11-02 RX ADMIN — IOPAMIDOL 80 ML: 612 INJECTION, SOLUTION INTRAVENOUS at 08:05

## 2023-11-09 ENCOUNTER — TELEPHONE (OUTPATIENT)
Facility: CLINIC | Age: 58
End: 2023-11-09

## 2023-11-09 DIAGNOSIS — R42 DIZZINESS: Primary | ICD-10-CM

## 2023-11-09 NOTE — TELEPHONE ENCOUNTER
Called to review results of head CT. Advised the CT head was negative for any abnormal findings. She continues to have intermittent dizziness that lasts a few seconds. Happens in any position. The problem happens a couple times a week. She is in agreement to have a 48 hour holter monitor completed.  Order entered and faxed to [facility] as requested

## 2023-11-21 DIAGNOSIS — K58.0 IRRITABLE BOWEL SYNDROME WITH DIARRHEA: ICD-10-CM

## 2023-11-22 RX ORDER — DICYCLOMINE HYDROCHLORIDE 10 MG/1
10 CAPSULE ORAL 3 TIMES DAILY
Qty: 90 CAPSULE | Refills: 0 | Status: SHIPPED | OUTPATIENT
Start: 2023-11-22

## 2023-12-28 DIAGNOSIS — K58.0 IRRITABLE BOWEL SYNDROME WITH DIARRHEA: ICD-10-CM

## 2024-01-02 RX ORDER — DICYCLOMINE HYDROCHLORIDE 10 MG/1
10 CAPSULE ORAL 3 TIMES DAILY
Qty: 90 CAPSULE | Refills: 0 | Status: SHIPPED | OUTPATIENT
Start: 2024-01-02

## 2024-01-11 ENCOUNTER — TELEPHONE (OUTPATIENT)
Facility: CLINIC | Age: 59
End: 2024-01-11

## 2024-01-11 NOTE — TELEPHONE ENCOUNTER
Patient stated that she was seen in the ER on 1/5 and sched her appt. For 1/16.      Patient is complaining that her leg is swollen real bad and she feel tingling in her feet.

## 2024-01-11 NOTE — TELEPHONE ENCOUNTER
Returned call.  Endorses pain and tingling in right thigh and her thigh is swollen and tight and extreme pain in lower back.   States she is at ortho now at this time.   She is waiting to be seen.  Has tried tylenol and motrin for relief  Advised to give me a call if she needs anything after this visit and keep her appointment for the 16th.  Patient verbalized understanding and is in agreement with this plan of care

## 2024-01-12 ENCOUNTER — TELEPHONE (OUTPATIENT)
Facility: CLINIC | Age: 59
End: 2024-01-12

## 2024-01-12 NOTE — TELEPHONE ENCOUNTER
ANITA  ----- Message from Paula Anil sent at 1/12/2024  1:39 PM EST -----  Subject: Message to Provider    QUESTIONS  Information for Provider? Pt said that she will wait 2 weeks since the   shot and some relief and will reach out or schedule an appt then.   ---------------------------------------------------------------------------  --------------  CALL BACK INFO  [phone]; OK to leave message on voicemail  ---------------------------------------------------------------------------  --------------  SCRIPT ANSWERS  Relationship to Patient? Self

## 2024-02-01 ENCOUNTER — TELEPHONE (OUTPATIENT)
Facility: CLINIC | Age: 59
End: 2024-02-01

## 2024-02-01 DIAGNOSIS — B37.31 VAGINAL YEAST INFECTION: Primary | ICD-10-CM

## 2024-02-01 RX ORDER — FLUCONAZOLE 150 MG/1
150 TABLET ORAL ONCE
Qty: 1 TABLET | Refills: 0 | Status: SHIPPED | OUTPATIENT
Start: 2024-02-01 | End: 2024-02-01

## 2024-02-01 NOTE — TELEPHONE ENCOUNTER
Returned call.  States she was put on doxycyline, states she feels like she is getting a vaginal yeas infection  Will prescribe diflucan.

## 2024-02-01 NOTE — TELEPHONE ENCOUNTER
Patient stated that the antibiotic her eye doctor had her taking, gave her an irritation on her vaginal.  The eye doctor told her to make contact with you to get medication for the irritation.

## 2024-02-15 ENCOUNTER — OFFICE VISIT (OUTPATIENT)
Facility: CLINIC | Age: 59
End: 2024-02-15
Payer: COMMERCIAL

## 2024-02-15 ENCOUNTER — HOSPITAL ENCOUNTER (OUTPATIENT)
Facility: HOSPITAL | Age: 59
Setting detail: SPECIMEN
Discharge: HOME | End: 2024-02-15
Payer: COMMERCIAL

## 2024-02-15 VITALS
WEIGHT: 180 LBS | BODY MASS INDEX: 29.99 KG/M2 | TEMPERATURE: 98 F | RESPIRATION RATE: 18 BRPM | SYSTOLIC BLOOD PRESSURE: 158 MMHG | OXYGEN SATURATION: 96 % | HEART RATE: 56 BPM | DIASTOLIC BLOOD PRESSURE: 95 MMHG | HEIGHT: 65 IN

## 2024-02-15 DIAGNOSIS — Z00.00 WELL WOMAN EXAM (NO GYNECOLOGICAL EXAM): Primary | ICD-10-CM

## 2024-02-15 DIAGNOSIS — K58.0 IRRITABLE BOWEL SYNDROME WITH DIARRHEA: ICD-10-CM

## 2024-02-15 DIAGNOSIS — G57.11 MERALGIA PARESTHETICA OF RIGHT SIDE: ICD-10-CM

## 2024-02-15 DIAGNOSIS — R73.03 PREDIABETES: ICD-10-CM

## 2024-02-15 DIAGNOSIS — E78.2 MIXED HYPERLIPIDEMIA: ICD-10-CM

## 2024-02-15 DIAGNOSIS — I10 PRIMARY HYPERTENSION: ICD-10-CM

## 2024-02-15 LAB
ALBUMIN SERPL-MCNC: 3.6 G/DL (ref 3.4–5)
ALBUMIN/GLOB SERPL: 1 (ref 0.8–1.7)
ALP SERPL-CCNC: 95 U/L (ref 45–117)
ALT SERPL-CCNC: 17 U/L (ref 13–56)
ANION GAP SERPL CALC-SCNC: 4 MMOL/L (ref 3–18)
AST SERPL-CCNC: 20 U/L (ref 10–38)
BILIRUB SERPL-MCNC: 0.5 MG/DL (ref 0.2–1)
BUN SERPL-MCNC: 15 MG/DL (ref 7–18)
BUN/CREAT SERPL: 18 (ref 12–20)
CALCIUM SERPL-MCNC: 9.2 MG/DL (ref 8.5–10.1)
CHLORIDE SERPL-SCNC: 108 MMOL/L (ref 100–111)
CHOLEST SERPL-MCNC: 193 MG/DL
CO2 SERPL-SCNC: 27 MMOL/L (ref 21–32)
CREAT SERPL-MCNC: 0.85 MG/DL (ref 0.6–1.3)
ERYTHROCYTE [DISTWIDTH] IN BLOOD BY AUTOMATED COUNT: 14.3 % (ref 11.6–14.5)
EST. AVERAGE GLUCOSE BLD GHB EST-MCNC: 123 MG/DL
GLOBULIN SER CALC-MCNC: 3.5 G/DL (ref 2–4)
GLUCOSE SERPL-MCNC: 95 MG/DL (ref 74–99)
HBA1C MFR BLD: 5.9 % (ref 4.2–5.6)
HCT VFR BLD AUTO: 34 % (ref 35–45)
HDLC SERPL-MCNC: 75 MG/DL (ref 40–60)
HDLC SERPL: 2.6 (ref 0–5)
HGB BLD-MCNC: 11 G/DL (ref 12–16)
LDLC SERPL CALC-MCNC: 96.6 MG/DL (ref 0–100)
LIPID PANEL: ABNORMAL
MCH RBC QN AUTO: 26.7 PG (ref 24–34)
MCHC RBC AUTO-ENTMCNC: 32.4 G/DL (ref 31–37)
MCV RBC AUTO: 82.5 FL (ref 78–100)
NRBC # BLD: 0 K/UL (ref 0–0.01)
NRBC BLD-RTO: 0 PER 100 WBC
PLATELET # BLD AUTO: 199 K/UL (ref 135–420)
PMV BLD AUTO: 11.4 FL (ref 9.2–11.8)
POTASSIUM SERPL-SCNC: 3.2 MMOL/L (ref 3.5–5.5)
PROT SERPL-MCNC: 7.1 G/DL (ref 6.4–8.2)
RBC # BLD AUTO: 4.12 M/UL (ref 4.2–5.3)
SODIUM SERPL-SCNC: 139 MMOL/L (ref 136–145)
TRIGL SERPL-MCNC: 107 MG/DL
VLDLC SERPL CALC-MCNC: 21.4 MG/DL
WBC # BLD AUTO: 5.7 K/UL (ref 4.6–13.2)

## 2024-02-15 PROCEDURE — 3077F SYST BP >= 140 MM HG: CPT | Performed by: NURSE PRACTITIONER

## 2024-02-15 PROCEDURE — 80061 LIPID PANEL: CPT

## 2024-02-15 PROCEDURE — 99396 PREV VISIT EST AGE 40-64: CPT | Performed by: NURSE PRACTITIONER

## 2024-02-15 PROCEDURE — 3080F DIAST BP >= 90 MM HG: CPT | Performed by: NURSE PRACTITIONER

## 2024-02-15 PROCEDURE — 83036 HEMOGLOBIN GLYCOSYLATED A1C: CPT

## 2024-02-15 PROCEDURE — 80053 COMPREHEN METABOLIC PANEL: CPT

## 2024-02-15 PROCEDURE — 36415 COLL VENOUS BLD VENIPUNCTURE: CPT

## 2024-02-15 PROCEDURE — 85027 COMPLETE CBC AUTOMATED: CPT

## 2024-02-15 RX ORDER — DICYCLOMINE HYDROCHLORIDE 10 MG/1
10 CAPSULE ORAL 3 TIMES DAILY
Qty: 90 CAPSULE | Refills: 3 | Status: SHIPPED | OUTPATIENT
Start: 2024-02-15

## 2024-02-15 NOTE — PROGRESS NOTES
Patient takes BP medication at night     Yeimy Khan presents today for   Chief Complaint   Patient presents with    Annual Exam       Is someone accompanying this pt? no    Is the patient using any DME equipment during OV? No     Depression Screening:       No data to display                Learning Assessment:      Health Maintenance reviewed and discussed and ordered per Provider.    Health Maintenance Due   Topic Date Due    Hepatitis B vaccine (1 of 3 - 3-dose series) Never done    HIV screen  Never done    Hepatitis C screen  Never done    Shingles vaccine (1 of 2) Never done    DTaP/Tdap/Td vaccine (2 - Td or Tdap) 05/26/2021    COVID-19 Vaccine (4 - 2023-24 season) 09/01/2023   .    \"Have you been to the ER, urgent care clinic since your last visit?  Hospitalized since your last visit?\"    no    “Have you seen or consulted any other health care providers outside of [facility] since your last visit?”    Eye doctor             
     Motor: Motor function is intact.      Coordination: Coordination is intact.      Gait: Gait is intact.   Psychiatric:         Attention and Perception: Attention and perception normal.         Mood and Affect: Mood and affect normal.         Speech: Speech normal.         Behavior: Behavior is cooperative.         Thought Content: Thought content normal.         Cognition and Memory: Cognition and memory normal.         Judgment: Judgment normal.                 I have discussed the diagnosis with the patient and the intended plan as seen in the above orders.  The patient has received an After-Visit Summary and questions were answered concerning future plans.     An After Visit Summary was printed and given to the patient.    All diagnosis have been discussed with the patient and all of the patient's questions have been answered.           Radha Rodriguez, AGNP-[facility]  [address] [address]

## 2024-03-08 DIAGNOSIS — E78.2 MIXED HYPERLIPIDEMIA: ICD-10-CM

## 2024-03-08 RX ORDER — PRAVASTATIN SODIUM 80 MG/1
TABLET ORAL
Qty: 90 TABLET | Refills: 1 | Status: SHIPPED | OUTPATIENT
Start: 2024-03-08

## 2024-03-22 DIAGNOSIS — I10 PRIMARY HYPERTENSION: ICD-10-CM

## 2024-03-22 RX ORDER — IRBESARTAN 300 MG/1
300 TABLET ORAL NIGHTLY
Qty: 90 TABLET | OUTPATIENT
Start: 2024-03-22

## 2024-04-12 DIAGNOSIS — I10 PRIMARY HYPERTENSION: ICD-10-CM

## 2024-04-14 RX ORDER — IRBESARTAN 300 MG/1
300 TABLET ORAL NIGHTLY
Qty: 90 TABLET | OUTPATIENT
Start: 2024-04-14

## 2024-04-15 DIAGNOSIS — I10 PRIMARY HYPERTENSION: ICD-10-CM

## 2024-04-15 NOTE — TELEPHONE ENCOUNTER
----- Message from Blanca Everettelulususan sent at 4/12/2024  4:17 PM EDT -----  Subject: Refill Request    QUESTIONS  Name of Medication? irbesartan (AVAPRO) 300 MG tablet  Patient-reported dosage and instructions? 1 at night  How many days do you have left? 3  Preferred Pharmacy? [facility] [id number]  Pharmacy phone number (if available)? [phone]  Additional Information for Provider? Yeimy was seen on 02/15/2024 and will   be out of her Irbesartan (AVAPRO) 300 MG tablets in 3 days and would like   a refill sent to [facility] she takes 1 at night and can be   reached at [phone] ok to leave a message  ---------------------------------------------------------------------------  --------------  CALL BACK INFO  What is the best way for the office to contact you? OK to leave message on   voicemail  Preferred Call Back Phone Number? [phone]  ---------------------------------------------------------------------------  --------------  SCRIPT ANSWERS  Relationship to Patient? Self

## 2024-04-16 RX ORDER — IRBESARTAN 300 MG/1
300 TABLET ORAL DAILY
Qty: 90 TABLET | Refills: 0 | Status: SHIPPED | OUTPATIENT
Start: 2024-04-16

## 2024-07-12 DIAGNOSIS — K58.0 IRRITABLE BOWEL SYNDROME WITH DIARRHEA: ICD-10-CM

## 2024-07-15 RX ORDER — DICYCLOMINE HYDROCHLORIDE 10 MG/1
10 CAPSULE ORAL 3 TIMES DAILY
Qty: 90 CAPSULE | Refills: 3 | Status: SHIPPED | OUTPATIENT
Start: 2024-07-15

## 2024-07-16 NOTE — PATIENT INSTRUCTIONS
Make sure staff does vital signs including heart rate blood pressure and weight during your visit to have blood work done Plan: Prescribing topical mupirocin ointment with wound care due to small adjacent erosion and recent biopsy near site.

## 2024-07-19 ENCOUNTER — OFFICE VISIT (OUTPATIENT)
Facility: CLINIC | Age: 59
End: 2024-07-19
Payer: COMMERCIAL

## 2024-07-19 VITALS
SYSTOLIC BLOOD PRESSURE: 152 MMHG | RESPIRATION RATE: 18 BRPM | HEIGHT: 65 IN | HEART RATE: 50 BPM | BODY MASS INDEX: 29.32 KG/M2 | TEMPERATURE: 98 F | OXYGEN SATURATION: 96 % | WEIGHT: 176 LBS | DIASTOLIC BLOOD PRESSURE: 86 MMHG

## 2024-07-19 DIAGNOSIS — R73.03 PREDIABETES: ICD-10-CM

## 2024-07-19 DIAGNOSIS — I10 PRIMARY HYPERTENSION: Primary | ICD-10-CM

## 2024-07-19 DIAGNOSIS — E78.2 MIXED HYPERLIPIDEMIA: ICD-10-CM

## 2024-07-19 DIAGNOSIS — M79.651 PAIN IN RIGHT THIGH: ICD-10-CM

## 2024-07-19 PROCEDURE — 3079F DIAST BP 80-89 MM HG: CPT | Performed by: NURSE PRACTITIONER

## 2024-07-19 PROCEDURE — 3077F SYST BP >= 140 MM HG: CPT | Performed by: NURSE PRACTITIONER

## 2024-07-19 PROCEDURE — 99214 OFFICE O/P EST MOD 30 MIN: CPT | Performed by: NURSE PRACTITIONER

## 2024-07-19 RX ORDER — PRAVASTATIN SODIUM 80 MG/1
80 TABLET ORAL DAILY
Qty: 90 TABLET | Refills: 1 | Status: SHIPPED | OUTPATIENT
Start: 2024-07-19

## 2024-07-19 ASSESSMENT — PATIENT HEALTH QUESTIONNAIRE - PHQ9
SUM OF ALL RESPONSES TO PHQ QUESTIONS 1-9: 0
SUM OF ALL RESPONSES TO PHQ QUESTIONS 1-9: 0
1. LITTLE INTEREST OR PLEASURE IN DOING THINGS: NOT AT ALL
2. FEELING DOWN, DEPRESSED OR HOPELESS: NOT AT ALL
SUM OF ALL RESPONSES TO PHQ QUESTIONS 1-9: 0
SUM OF ALL RESPONSES TO PHQ9 QUESTIONS 1 & 2: 0
SUM OF ALL RESPONSES TO PHQ QUESTIONS 1-9: 0

## 2024-07-19 NOTE — PROGRESS NOTES
Room 8      Yeimy Khan had concerns including Follow-up Chronic Condition, Hypertension, to [facility] concerns for fibroid embolization in right thigh , and Edema (Right side of throat ). for today's visit .     When asked if patient has any concerns she/he would like to address with CARIN Rodriguez . CARIN Rodriguez has been notified  of patient concerns .      1. \"Have you been to the ER, urgent care clinic since your last visit?  Hospitalized since your last visit?\" .NO    2. \"Have you seen or consulted any other health care providers outside of the [facility] since your last visit?\" No     3. For patients aged 45-75: Has the patient had a colonoscopy / FIT/ Cologuard? Yes      If the patient is female:    4. For patients aged 40-74: Has the patient had a mammogram within the past 2 years? Yes      5. For patients aged 21-65: Has the patient had a pap smear? {Cancer Care Gap present? Yes         7/19/2024     9:51 AM   PHQ-9    Little interest or pleasure in doing things 0   Feeling down, depressed, or hopeless 0   PHQ-2 Score 0   PHQ-9 Total Score 0          Health Maintenance Due   Topic Date Due    Hepatitis B vaccine (1 of 3 - 3-dose series) Never done    HIV screen  Never done    Hepatitis C screen  Never done    Shingles vaccine (1 of 2) Never done    DTaP/Tdap/Td vaccine (2 - Td or Tdap) 05/26/2021    COVID-19 Vaccine (4 - 2023-24 season) 09/01/2023

## 2024-07-19 NOTE — PROGRESS NOTES
[address] [place] [id number]               [phone]      Yeimy Khan is a 59 y.o. female and presents with Follow-up Chronic Condition, Hypertension, to [facility] concerns for fibroid embolization in right thigh , and Edema (Right side of throat )       Assessment/Plan:    1. Primary hypertension  -     verapamil (CALAN SR) 180 MG extended release tablet; Take 1 tablet by mouth daily, Disp-30 tablet, R-0Normal  -     CBC; Future  -     Comprehensive Metabolic Panel; Future  2. Pain in right thigh  -     Vascular duplex lower extremity venous right; Future  3. Mixed hyperlipidemia  -     pravastatin (PRAVACHOL) 80 MG tablet; Take 1 tablet by mouth daily, Disp-90 tablet, R-1Normal  -     Lipid Panel; Future  4. Prediabetes  -     Hemoglobin A1C; Future    Blood pressure uncontrolled, increase verapamil to 180mg a day, follow up blood pressure in 1 month   Endorses pain in right medial thigh, she is concerned about having a blood clot, no edema ppp 2+, order for PVL placed  Refills provided  Labs prior to next visit in 4 months  Patient verbalized understanding and is in agreement with this plan of care        Follow up and disposition:   Return in about 4 weeks (around 8/16/2024) for NV, b/pcheck, 4month, Physical, HTN, HLD, 30min, office, w/labsprior.      Subjective:    Labs obtained prior to visit? No  Reviewed with patient? N/A    Hypertension:  BP (!) 152/86   Pulse 50   Temp 98 °F (36.7 °C) (Temporal)   Resp 18   Ht 1.651 m (5' 5\")   Wt 79.8 kg (176 lb)   SpO2 96%   BMI 29.29 kg/m²    Patient reports taking medications as instructed. Yes   Medication side effects noted no  Headache upon wakening.no   Home BP monitoring: no  Do you experience chest pain/pressure or SOB with exertion? no  Maintain a low Sodium diet? Yes  Lab Results   Component Value Date/Time    BUN 15 02/15/2024 03:50 PM    CREATININE 0.85 02/15/2024 03:50 PM    LABGLOM >60 02/15/2024 03:50 PM

## 2024-07-29 DIAGNOSIS — I10 PRIMARY HYPERTENSION: ICD-10-CM

## 2024-07-29 RX ORDER — IRBESARTAN 300 MG/1
300 TABLET ORAL DAILY
Qty: 90 TABLET | Refills: 0 | OUTPATIENT
Start: 2024-07-29

## 2024-08-24 DIAGNOSIS — I10 PRIMARY HYPERTENSION: ICD-10-CM

## 2024-08-29 DIAGNOSIS — I10 PRIMARY HYPERTENSION: ICD-10-CM

## 2024-08-29 RX ORDER — IRBESARTAN 300 MG/1
300 TABLET ORAL DAILY
Qty: 90 TABLET | Refills: 0 | OUTPATIENT
Start: 2024-08-29

## 2024-08-29 NOTE — TELEPHONE ENCOUNTER
7-19-24 Last office visit  Return in about 4 weeks (around 8/16/2024) for NV, b/pcheck, 4month, Physical, HTN, HLD, 30min, office, w/labsprior.   Inactive my chart

## 2024-08-30 DIAGNOSIS — I10 PRIMARY HYPERTENSION: ICD-10-CM

## 2024-08-30 RX ORDER — IRBESARTAN 300 MG/1
300 TABLET ORAL DAILY
Qty: 90 TABLET | Refills: 0 | Status: SHIPPED | OUTPATIENT
Start: 2024-08-30

## 2024-08-30 NOTE — TELEPHONE ENCOUNTER
Patient stated that the pharmacy is trying to refill  her Rx for Irbesartan 300 mg tablet.    Patient has taken her last pill on 8/28    Patient will be sched for a BP Check on 9/3 @ 10:15 am and she is sched for an office visit in Oct.    Patient is requesting a callback if  her Rx is sent to the pharmacy.

## 2024-09-26 DIAGNOSIS — I10 PRIMARY HYPERTENSION: ICD-10-CM

## 2024-09-26 RX ORDER — VERAPAMIL HYDROCHLORIDE 180 MG/1
180 TABLET, EXTENDED RELEASE ORAL DAILY
Qty: 30 TABLET | Refills: 0 | OUTPATIENT
Start: 2024-09-26

## 2024-09-27 ENCOUNTER — TELEPHONE (OUTPATIENT)
Facility: CLINIC | Age: 59
End: 2024-09-27

## 2024-09-27 RX ORDER — VERAPAMIL HYDROCHLORIDE 180 MG/1
180 TABLET, EXTENDED RELEASE ORAL DAILY
Qty: 90 TABLET | Refills: 1 | Status: SHIPPED | OUTPATIENT
Start: 2024-09-27

## 2024-11-16 DIAGNOSIS — I10 PRIMARY HYPERTENSION: ICD-10-CM

## 2024-11-19 RX ORDER — IRBESARTAN 300 MG/1
300 TABLET ORAL DAILY
Qty: 90 TABLET | Refills: 0 | OUTPATIENT
Start: 2024-11-19

## 2024-12-03 ENCOUNTER — OFFICE VISIT (OUTPATIENT)
Facility: CLINIC | Age: 59
End: 2024-12-03
Payer: COMMERCIAL

## 2024-12-03 ENCOUNTER — HOSPITAL ENCOUNTER (OUTPATIENT)
Facility: HOSPITAL | Age: 59
Setting detail: SPECIMEN
Discharge: HOME | End: 2024-12-06
Payer: COMMERCIAL

## 2024-12-03 VITALS
TEMPERATURE: 98.1 F | RESPIRATION RATE: 18 BRPM | SYSTOLIC BLOOD PRESSURE: 170 MMHG | HEIGHT: 65 IN | BODY MASS INDEX: 30.49 KG/M2 | HEART RATE: 57 BPM | WEIGHT: 183 LBS | OXYGEN SATURATION: 97 % | DIASTOLIC BLOOD PRESSURE: 100 MMHG

## 2024-12-03 DIAGNOSIS — I10 PRIMARY HYPERTENSION: Primary | ICD-10-CM

## 2024-12-03 DIAGNOSIS — R73.03 PREDIABETES: ICD-10-CM

## 2024-12-03 DIAGNOSIS — E78.2 MIXED HYPERLIPIDEMIA: ICD-10-CM

## 2024-12-03 DIAGNOSIS — I10 PRIMARY HYPERTENSION: ICD-10-CM

## 2024-12-03 LAB
ALBUMIN SERPL-MCNC: 3.7 G/DL (ref 3.4–5)
ALBUMIN/GLOB SERPL: 1 (ref 0.8–1.7)
ALP SERPL-CCNC: 93 U/L (ref 45–117)
ALT SERPL-CCNC: 20 U/L (ref 13–56)
ANION GAP SERPL CALC-SCNC: 5 MMOL/L (ref 3–18)
AST SERPL-CCNC: 20 U/L (ref 10–38)
BILIRUB SERPL-MCNC: 0.4 MG/DL (ref 0.2–1)
BUN SERPL-MCNC: 15 MG/DL (ref 7–18)
BUN/CREAT SERPL: 17 (ref 12–20)
CALCIUM SERPL-MCNC: 9.4 MG/DL (ref 8.5–10.1)
CHLORIDE SERPL-SCNC: 106 MMOL/L (ref 100–111)
CHOLEST SERPL-MCNC: 206 MG/DL
CO2 SERPL-SCNC: 28 MMOL/L (ref 21–32)
CREAT SERPL-MCNC: 0.9 MG/DL (ref 0.6–1.3)
ERYTHROCYTE [DISTWIDTH] IN BLOOD BY AUTOMATED COUNT: 14.6 % (ref 11.6–14.5)
EST. AVERAGE GLUCOSE BLD GHB EST-MCNC: 123 MG/DL
GLOBULIN SER CALC-MCNC: 3.6 G/DL (ref 2–4)
GLUCOSE SERPL-MCNC: 90 MG/DL (ref 74–99)
HBA1C MFR BLD: 5.9 % (ref 4.2–5.6)
HCT VFR BLD AUTO: 36.3 % (ref 35–45)
HDLC SERPL-MCNC: 81 MG/DL (ref 40–60)
HDLC SERPL: 2.5 (ref 0–5)
HGB BLD-MCNC: 11.2 G/DL (ref 12–16)
LDLC SERPL CALC-MCNC: 111.8 MG/DL (ref 0–100)
LIPID PANEL: ABNORMAL
MCH RBC QN AUTO: 26.7 PG (ref 24–34)
MCHC RBC AUTO-ENTMCNC: 30.9 G/DL (ref 31–37)
MCV RBC AUTO: 86.4 FL (ref 78–100)
NRBC # BLD: 0 K/UL (ref 0–0.01)
NRBC BLD-RTO: 0 PER 100 WBC
PLATELET # BLD AUTO: 216 K/UL (ref 135–420)
PMV BLD AUTO: 11.2 FL (ref 9.2–11.8)
POTASSIUM SERPL-SCNC: 3.5 MMOL/L (ref 3.5–5.5)
PROT SERPL-MCNC: 7.3 G/DL (ref 6.4–8.2)
RBC # BLD AUTO: 4.2 M/UL (ref 4.2–5.3)
SODIUM SERPL-SCNC: 139 MMOL/L (ref 136–145)
TRIGL SERPL-MCNC: 66 MG/DL
VLDLC SERPL CALC-MCNC: 13.2 MG/DL
WBC # BLD AUTO: 5.1 K/UL (ref 4.6–13.2)

## 2024-12-03 PROCEDURE — 85027 COMPLETE CBC AUTOMATED: CPT

## 2024-12-03 PROCEDURE — 3080F DIAST BP >= 90 MM HG: CPT | Performed by: NURSE PRACTITIONER

## 2024-12-03 PROCEDURE — 36415 COLL VENOUS BLD VENIPUNCTURE: CPT

## 2024-12-03 PROCEDURE — 3077F SYST BP >= 140 MM HG: CPT | Performed by: NURSE PRACTITIONER

## 2024-12-03 PROCEDURE — 83036 HEMOGLOBIN GLYCOSYLATED A1C: CPT

## 2024-12-03 PROCEDURE — 99214 OFFICE O/P EST MOD 30 MIN: CPT | Performed by: NURSE PRACTITIONER

## 2024-12-03 PROCEDURE — 80061 LIPID PANEL: CPT

## 2024-12-03 PROCEDURE — 80053 COMPREHEN METABOLIC PANEL: CPT

## 2024-12-03 RX ORDER — PRAVASTATIN SODIUM 80 MG/1
80 TABLET ORAL DAILY
Qty: 90 TABLET | Refills: 1 | Status: SHIPPED | OUTPATIENT
Start: 2024-12-03

## 2024-12-03 RX ORDER — IRBESARTAN 300 MG/1
300 TABLET ORAL DAILY
Qty: 90 TABLET | Refills: 0 | Status: SHIPPED | OUTPATIENT
Start: 2024-12-03

## 2024-12-03 RX ORDER — VERAPAMIL HYDROCHLORIDE 180 MG/1
180 TABLET, EXTENDED RELEASE ORAL DAILY
Qty: 90 TABLET | Refills: 1 | Status: CANCELLED | OUTPATIENT
Start: 2024-12-03

## 2024-12-03 SDOH — ECONOMIC STABILITY: INCOME INSECURITY: HOW HARD IS IT FOR YOU TO PAY FOR THE VERY BASICS LIKE FOOD, HOUSING, MEDICAL CARE, AND HEATING?: NOT HARD AT ALL

## 2024-12-03 SDOH — ECONOMIC STABILITY: FOOD INSECURITY: WITHIN THE PAST 12 MONTHS, YOU WORRIED THAT YOUR FOOD WOULD RUN OUT BEFORE YOU GOT MONEY TO BUY MORE.: NEVER TRUE

## 2024-12-03 SDOH — ECONOMIC STABILITY: FOOD INSECURITY: WITHIN THE PAST 12 MONTHS, THE FOOD YOU BOUGHT JUST DIDN'T LAST AND YOU DIDN'T HAVE MONEY TO GET MORE.: NEVER TRUE

## 2024-12-03 SDOH — ECONOMIC STABILITY: TRANSPORTATION INSECURITY
IN THE PAST 12 MONTHS, HAS LACK OF TRANSPORTATION KEPT YOU FROM MEETINGS, WORK, OR FROM GETTING THINGS NEEDED FOR DAILY LIVING?: NO

## 2024-12-03 ASSESSMENT — PATIENT HEALTH QUESTIONNAIRE - PHQ9
SUM OF ALL RESPONSES TO PHQ QUESTIONS 1-9: 0
SUM OF ALL RESPONSES TO PHQ QUESTIONS 1-9: 0
2. FEELING DOWN, DEPRESSED OR HOPELESS: NOT AT ALL
SUM OF ALL RESPONSES TO PHQ9 QUESTIONS 1 & 2: 0
SUM OF ALL RESPONSES TO PHQ QUESTIONS 1-9: 0
SUM OF ALL RESPONSES TO PHQ QUESTIONS 1-9: 0
1. LITTLE INTEREST OR PLEASURE IN DOING THINGS: NOT AT ALL

## 2024-12-03 NOTE — PROGRESS NOTES
Room 8    Yeimy hKan had concerns including Follow-up Chronic Condition and Hypertension. for today's visit .     After obtaining verbal  consent, and per orders of CARIN Rodriguez , injection of Tdap vaccine given in Left deltoid medial by Chet Rinaldi CMA . Patient instructed to remain in clinic for 20 minutes afterwards, and to report any adverse reaction to me immediately.    When asked if patient has any concerns she would like to address with CARIN Rodriguez patient states I would like to discuss lower abdominal pain . CARIN Rodriguez has been notified  of patient concerns .      Did you take your medication today? Patient stats I did not take my medication last night       1. \"Have you been to the ER, urgent care clinic since your last visit?  Hospitalized since your last visit?\" .NO    2. \"Have you seen or consulted any other health care providers outside of the [facility] since your last visit?\" No     3. For patients aged 45-75: Has the patient had a colonoscopy / FIT/ Cologuard? Yes      If the patient is female:    4. For patients aged 40-74: Has the patient had a mammogram within the past 2 years? Yes       5. For patients aged 21-65: Has the patient had a pap smear? {Cancer Care Gap present? Yes             12/3/2024     9:20 AM   PHQ-9    Little interest or pleasure in doing things 0   Feeling down, depressed, or hopeless 0   PHQ-2 Score 0   PHQ-9 Total Score 0          Health Maintenance Due   Topic Date Due    HIV screen  Never done    Hepatitis C screen  Never done    Hepatitis B vaccine (1 of 3 - 19+ 3-dose series) Never done    Shingles vaccine (1 of 2) Never done    DTaP/Tdap/Td vaccine (2 - Td or Tdap) 05/26/2021    COVID-19 Vaccine (5 - 2023-24 season) 09/01/2024             
by mouth daily       Angiotensin II Receptor Antagonists       irbesartan (AVAPRO) 300 MG tablet Take 1 tablet by mouth daily           HLD:  Has been compliant with meds  Yes  Compliant with low-fat diet.  most of the time    Denies myalgias or other side effects. yes  The 10-year ASCVD risk score (Lm MAURER, et al., 2019) is: 7.3%    Lab Results   Component Value Date/Time    TRIG 107 02/15/2024 03:50 PM    CHOL 193 02/15/2024 03:50 PM    HDL 75 02/15/2024 03:50 PM    LDL 96.6 02/15/2024 03:50 PM     Lipid Lowering Medications       HMG CoA Reductase Inhibitors       pravastatin (PRAVACHOL) 80 MG tablet Take 1 tablet by mouth daily             ROS:     Review of Systems  As stated in HPI, otherwise all others negative.       The problem list was updated as a part of today's visit.  Patient Active Problem List   Diagnosis    Mild cognitive disorder    Family history of diabetes mellitus in mother    Primary hypertension    Irritable bowel syndrome with diarrhea    Mixed hyperlipidemia    History of TIA (transient ischemic attack)    Meralgia paresthetica of right side    Prediabetes       The PSH, FH were reviewed.      SH:  Social History     Tobacco Use    Smoking status: Never    Smokeless tobacco: Never   Substance Use Topics    Alcohol use: No    Drug use: No       Medications/Allergies:  Current Outpatient Medications on File Prior to Visit   Medication Sig Dispense Refill    verapamil (CALAN SR) 180 MG extended release tablet Take 1 tablet by mouth daily 90 tablet 1    irbesartan (AVAPRO) 300 MG tablet Take 1 tablet by mouth daily 90 tablet 0    pravastatin (PRAVACHOL) 80 MG tablet Take 1 tablet by mouth daily 90 tablet 1    dicyclomine (BENTYL) 10 MG capsule TAKE 1 CAPSULE BY MOUTH THREE TIMES DAILY 90 capsule 3    acetaminophen (TYLENOL) 325 MG tablet Take by mouth every 6 hours as needed      aspirin 81 MG EC tablet TAKE 1 TABLET BY MOUTH EVERY DAY       No current facility-administered medications on

## 2025-01-07 ENCOUNTER — HOSPITAL ENCOUNTER (OUTPATIENT)
Facility: HOSPITAL | Age: 60
Discharge: HOME | End: 2025-01-10
Payer: COMMERCIAL

## 2025-01-07 DIAGNOSIS — K58.9 IRRITABLE BOWEL SYNDROME, UNSPECIFIED TYPE: ICD-10-CM

## 2025-01-07 DIAGNOSIS — R10.12 ABDOMINAL PAIN, LEFT UPPER QUADRANT: ICD-10-CM

## 2025-01-07 PROCEDURE — 6360000004 HC RX CONTRAST MEDICATION: Performed by: NURSE PRACTITIONER

## 2025-01-07 PROCEDURE — 2500000003 HC RX 250 WO HCPCS: Performed by: NURSE PRACTITIONER

## 2025-01-07 PROCEDURE — 74177 CT ABD & PELVIS W/CONTRAST: CPT

## 2025-01-07 RX ORDER — IOPAMIDOL 612 MG/ML
100 INJECTION, SOLUTION INTRAVASCULAR
Status: COMPLETED | OUTPATIENT
Start: 2025-01-07 | End: 2025-01-07

## 2025-01-07 RX ADMIN — IOPAMIDOL 100 ML: 612 INJECTION, SOLUTION INTRAVENOUS at 19:44

## 2025-01-07 RX ADMIN — BARIUM SULFATE 900 ML: 20 SUSPENSION ORAL at 19:44

## 2025-01-15 ENCOUNTER — NURSE ONLY (OUTPATIENT)
Facility: CLINIC | Age: 60
End: 2025-01-15

## 2025-01-15 VITALS
HEIGHT: 65 IN | HEART RATE: 89 BPM | RESPIRATION RATE: 18 BRPM | WEIGHT: 179.6 LBS | OXYGEN SATURATION: 90 % | TEMPERATURE: 90 F | BODY MASS INDEX: 29.92 KG/M2 | DIASTOLIC BLOOD PRESSURE: 82 MMHG | SYSTOLIC BLOOD PRESSURE: 155 MMHG

## 2025-01-15 DIAGNOSIS — I10 PRIMARY HYPERTENSION: Primary | ICD-10-CM

## 2025-01-15 DIAGNOSIS — K58.0 IRRITABLE BOWEL SYNDROME WITH DIARRHEA: ICD-10-CM

## 2025-01-15 RX ORDER — CHLORTHALIDONE 25 MG/1
25 TABLET ORAL DAILY
Qty: 30 TABLET | Refills: 1 | Status: SHIPPED | OUTPATIENT
Start: 2025-01-15

## 2025-01-15 RX ORDER — DICYCLOMINE HYDROCHLORIDE 10 MG/1
10 CAPSULE ORAL 3 TIMES DAILY
Qty: 90 CAPSULE | Refills: 3 | Status: SHIPPED | OUTPATIENT
Start: 2025-01-15

## 2025-01-15 SDOH — ECONOMIC STABILITY: FOOD INSECURITY: WITHIN THE PAST 12 MONTHS, YOU WORRIED THAT YOUR FOOD WOULD RUN OUT BEFORE YOU GOT MONEY TO BUY MORE.: NEVER TRUE

## 2025-01-15 SDOH — ECONOMIC STABILITY: FOOD INSECURITY: WITHIN THE PAST 12 MONTHS, THE FOOD YOU BOUGHT JUST DIDN'T LAST AND YOU DIDN'T HAVE MONEY TO GET MORE.: NEVER TRUE

## 2025-01-15 ASSESSMENT — PATIENT HEALTH QUESTIONNAIRE - PHQ9
SUM OF ALL RESPONSES TO PHQ QUESTIONS 1-9: 0
2. FEELING DOWN, DEPRESSED OR HOPELESS: NOT AT ALL
SUM OF ALL RESPONSES TO PHQ QUESTIONS 1-9: 0
1. LITTLE INTEREST OR PLEASURE IN DOING THINGS: NOT AT ALL
SUM OF ALL RESPONSES TO PHQ9 QUESTIONS 1 & 2: 0

## 2025-01-15 NOTE — PROGRESS NOTES
Per Linda Rodriguez instructions patient presents today for Blood Pressure check. Patient seated and resting 15 min with both feet flat on the floor. Blood pressure taking and documented . Reported blood pressure to LINDA Rodriguez.      Vitals:    01/15/25 1631   BP: (!) 152/88   Pulse:    Resp:    Temp:    SpO2:

## 2025-01-15 NOTE — PROGRESS NOTES
Blood pressure continues to be elevated in the office  At home she states it runs 140, 146  Will add chlorthalidone  Follow up in 4 weeks for recheck  Patient verbalized understanding and is in agreement with this plan of care

## 2025-02-11 ENCOUNTER — OFFICE VISIT (OUTPATIENT)
Facility: CLINIC | Age: 60
End: 2025-02-11
Payer: COMMERCIAL

## 2025-02-11 VITALS
HEART RATE: 74 BPM | RESPIRATION RATE: 18 BRPM | OXYGEN SATURATION: 96 % | SYSTOLIC BLOOD PRESSURE: 101 MMHG | WEIGHT: 175.2 LBS | HEIGHT: 65 IN | BODY MASS INDEX: 29.19 KG/M2 | TEMPERATURE: 97.5 F | DIASTOLIC BLOOD PRESSURE: 69 MMHG

## 2025-02-11 DIAGNOSIS — R05.1 ACUTE COUGH: ICD-10-CM

## 2025-02-11 DIAGNOSIS — J06.9 VIRAL URI: Primary | ICD-10-CM

## 2025-02-11 DIAGNOSIS — R50.9 FEVER, UNSPECIFIED FEVER CAUSE: ICD-10-CM

## 2025-02-11 PROCEDURE — 99213 OFFICE O/P EST LOW 20 MIN: CPT | Performed by: NURSE PRACTITIONER

## 2025-02-11 PROCEDURE — 3078F DIAST BP <80 MM HG: CPT | Performed by: NURSE PRACTITIONER

## 2025-02-11 PROCEDURE — 3074F SYST BP LT 130 MM HG: CPT | Performed by: NURSE PRACTITIONER

## 2025-02-11 RX ORDER — CODEINE PHOSPHATE AND GUAIFENESIN 10; 100 MG/5ML; MG/5ML
5 SOLUTION ORAL 3 TIMES DAILY PRN
Qty: 105 ML | Refills: 0 | Status: SHIPPED | OUTPATIENT
Start: 2025-02-11 | End: 2025-02-18

## 2025-02-11 SDOH — ECONOMIC STABILITY: FOOD INSECURITY: WITHIN THE PAST 12 MONTHS, YOU WORRIED THAT YOUR FOOD WOULD RUN OUT BEFORE YOU GOT MONEY TO BUY MORE.: NEVER TRUE

## 2025-02-11 SDOH — ECONOMIC STABILITY: FOOD INSECURITY: WITHIN THE PAST 12 MONTHS, THE FOOD YOU BOUGHT JUST DIDN'T LAST AND YOU DIDN'T HAVE MONEY TO GET MORE.: NEVER TRUE

## 2025-02-11 ASSESSMENT — PATIENT HEALTH QUESTIONNAIRE - PHQ9
2. FEELING DOWN, DEPRESSED OR HOPELESS: NOT AT ALL
1. LITTLE INTEREST OR PLEASURE IN DOING THINGS: NOT AT ALL
SUM OF ALL RESPONSES TO PHQ9 QUESTIONS 1 & 2: 0
SUM OF ALL RESPONSES TO PHQ QUESTIONS 1-9: 0

## 2025-02-11 NOTE — PROGRESS NOTES
[address] [place] [address]               [phone]      Yeimy hKan is a 59 y.o. female and presents with Cold Symptoms       Assessment/Plan:    1. Viral URI  -     AMB POC RAPID INFLUENZA TEST  2. Acute cough  -     AMB POC RAPID INFLUENZA TEST  -     guaiFENesin-codeine (GUAIFENESIN AC) 100-10 MG/5ML liquid; Take 5 mLs by mouth 3 times daily as needed for Cough for up to 7 days. Max Daily Amount: 15 mLs, Disp-105 mL, R-0Normal  3. Fever, unspecified fever cause  -     AMB POC RAPID INFLUENZA TEST  POC flu test negative   Most likely viral illness, recommendations for symptomatic relief provided  Rx for cough syrup provided  Patient verbalized understanding and is in agreement with this plan of care       Follow up and disposition:   Return in about 2 months (around 4/11/2025) for Physical, w/ogyn, HTN, HLD, 30min, office, w/labsprior..      Subjective:    Labs obtained prior to visit? No  Reviewed with patient? N/A    Viral symptoms  Started Friday night, feeling bad and vomited  Next morning achy, chills, vomiting, diarrhea, lack of appetite  Treatments: theraflu, mucinex, emergen-C, tylenol, ibuprofen  Endorses: cough, itchy throat, poor appetite, diarrhea  Denies current vomiting    ROS:     Review of Systems  As stated in HPI, otherwise all others negative.       The problem list was updated as a part of today's visit.  Patient Active Problem List   Diagnosis    Mild cognitive disorder    Family history of diabetes mellitus in mother    Primary hypertension    Irritable bowel syndrome with diarrhea    Mixed hyperlipidemia    History of TIA (transient ischemic attack)    Meralgia paresthetica of right side    Prediabetes       The PSH, FH were reviewed.      SH:  Social History     Tobacco Use    Smoking status: Never    Smokeless tobacco: Never   Substance Use Topics    Alcohol use: No    Drug use: Never       Medications/Allergies:  Current Outpatient

## 2025-02-11 NOTE — PROGRESS NOTES
Room 7    When asked if patient has seen the (referral) patient states . Patient referral order has been re-printed and address has been highlighted for patient.    Yeimy Khan had concerns including Cold Symptoms. for today's visit .     When asked if patient has any concerns she would like to address with CARIN Rodriguez patient states the cold symptoms started on 02/7/2025. Patient states I have been taking Tylenol, Theraflu and I have consistent congestion and cough  . CARIN Rodriguez has been notified  of patient concerns .      Did you take your medication today? Patient states no       1. \"Have you been to the ER, urgent care clinic since your last visit?  Hospitalized since your last visit?\" .NO    2. \"Have you seen or consulted any other health care providers outside of the [facility] since your last visit?\" NO     3. For patients aged 45-75: Has the patient had a colonoscopy / FIT/ Cologuard?  Yes     If the patient is female:    4. For patients aged 40-74: Has the patient had a mammogram within the past 2 years? Yes       5. For patients aged 21-65: Has the patient had a pap smear? {Cancer Care Gap present? Yes           2/11/2025     1:12 PM   PHQ-9    Little interest or pleasure in doing things 0   Feeling down, depressed, or hopeless 0   PHQ-2 Score 0   PHQ-9 Total Score 0          Health Maintenance Due   Topic Date Due    HIV screen  Never done    Hepatitis C screen  Never done    Hepatitis B vaccine (1 of 3 - 19+ 3-dose series) Never done    Shingles vaccine (1 of 2) Never done    Pneumococcal 50+ years Vaccine (1 of 1 - PCV) Never done    DTaP/Tdap/Td vaccine (2 - Td or Tdap) 05/26/2021    COVID-19 Vaccine (5 - 2024-25 season) 09/01/2024    Breast cancer screen  02/09/2025

## 2025-02-11 NOTE — PATIENT INSTRUCTIONS
Upper respiratory infections/sinusitis/colds  Rest  Increase fluids  Gargle with warm salt water as needed for sore throat  Apply heat to sinuses as needed  Use a mist vaporizer  Put Vicks vapor rub or other similar essential oil in a bowl of hot water and breath the mist to open nasal passages  Take an antihistamine such as Zyrtec, Claritin, Xyzal  or Allegra for sinus and nasal congestion.  All are available over the counter, generics are fine.  Try a nasal rinse with a neti pot, or device of choice. Do not use tap water. Use distilled or sterile water available at the pharmacy.  Ibuprofen or tylenol for aches and pains. They can be taken alternating between the two.  Flonase nasal spray for nasal congestion, use daily. This is available over the counter.  Flonase decreases inflammation   Take over the counter cough medication for cough.  If you have high blood pressure, Coricidin has a high blood pressure formulation.

## 2025-02-14 DIAGNOSIS — I10 PRIMARY HYPERTENSION: ICD-10-CM

## 2025-02-14 RX ORDER — IRBESARTAN 300 MG/1
300 TABLET ORAL DAILY
Qty: 90 TABLET | Refills: 0 | OUTPATIENT
Start: 2025-02-14

## 2025-02-14 NOTE — TELEPHONE ENCOUNTER
Last office visit 2-11-25 acute  Return in about 2 months (around 4/11/2025) for Physical, w/ogyn, HTN, HLD, 30min, office, w/labsprior..   Inactive my chart

## 2025-02-17 DIAGNOSIS — I10 PRIMARY HYPERTENSION: ICD-10-CM

## 2025-02-17 RX ORDER — IRBESARTAN 300 MG/1
300 TABLET ORAL DAILY
Qty: 90 TABLET | Refills: 0 | OUTPATIENT
Start: 2025-02-17

## 2025-02-17 NOTE — TELEPHONE ENCOUNTER
Mrs. Rona Khan called and stated her medication irbesartan 300 MG tablet was not approved. She wants to know if you call the pharmacy and see why it was denied. Thanks

## 2025-03-08 DIAGNOSIS — M79.651 PAIN IN RIGHT THIGH: ICD-10-CM

## 2025-03-11 DIAGNOSIS — I10 PRIMARY HYPERTENSION: ICD-10-CM

## 2025-03-11 RX ORDER — IRBESARTAN 300 MG/1
300 TABLET ORAL DAILY
Qty: 30 TABLET | OUTPATIENT
Start: 2025-03-11

## 2025-03-11 NOTE — TELEPHONE ENCOUNTER
12-3-24 last follow up appointment  NV for bp check completed  3month, Physical, w/ogyn, HTN, HLD, 30min, office, w/labsprior.   Inactive my chart

## 2025-03-12 DIAGNOSIS — I10 PRIMARY HYPERTENSION: ICD-10-CM

## 2025-03-12 RX ORDER — IRBESARTAN 300 MG/1
300 TABLET ORAL DAILY
Qty: 90 TABLET | Refills: 0 | Status: SHIPPED | OUTPATIENT
Start: 2025-03-12

## 2025-03-20 DIAGNOSIS — I10 PRIMARY HYPERTENSION: ICD-10-CM

## 2025-03-21 ENCOUNTER — TELEPHONE (OUTPATIENT)
Facility: CLINIC | Age: 60
End: 2025-03-21

## 2025-03-21 RX ORDER — CHLORTHALIDONE 25 MG/1
25 TABLET ORAL DAILY
Qty: 90 TABLET | Refills: 1 | Status: SHIPPED | OUTPATIENT
Start: 2025-03-21

## 2025-03-21 NOTE — TELEPHONE ENCOUNTER
PT called requesting refill for guaiFENesin-codeine (GUAIFENESIN AC) 100-10 MG/5ML liquid. PT states she is having the same cough like issues and this medication is the only option that relieves symptoms.

## 2025-03-24 DIAGNOSIS — R05.1 ACUTE COUGH: ICD-10-CM

## 2025-03-25 RX ORDER — CODEINE PHOSPHATE AND GUAIFENESIN 10; 100 MG/5ML; MG/5ML
SOLUTION ORAL
Qty: 105 ML | OUTPATIENT
Start: 2025-03-25

## 2025-04-02 ENCOUNTER — TELEMEDICINE (OUTPATIENT)
Facility: CLINIC | Age: 60
End: 2025-04-02
Payer: COMMERCIAL

## 2025-04-02 DIAGNOSIS — J06.9 UPPER RESPIRATORY TRACT INFECTION, UNSPECIFIED TYPE: Primary | ICD-10-CM

## 2025-04-02 DIAGNOSIS — B37.9 ANTIBIOTIC-INDUCED YEAST INFECTION: ICD-10-CM

## 2025-04-02 DIAGNOSIS — T36.95XA ANTIBIOTIC-INDUCED YEAST INFECTION: ICD-10-CM

## 2025-04-02 PROCEDURE — 99213 OFFICE O/P EST LOW 20 MIN: CPT | Performed by: NURSE PRACTITIONER

## 2025-04-02 RX ORDER — FLUCONAZOLE 150 MG/1
150 TABLET ORAL ONCE
Qty: 1 TABLET | Refills: 0 | Status: SHIPPED | OUTPATIENT
Start: 2025-04-02 | End: 2025-04-02

## 2025-04-02 RX ORDER — AMOXICILLIN 875 MG/1
875 TABLET, COATED ORAL 2 TIMES DAILY
Qty: 20 TABLET | Refills: 0 | Status: SHIPPED | OUTPATIENT
Start: 2025-04-02 | End: 2025-04-12

## 2025-04-02 NOTE — PROGRESS NOTES
Yeimy Khan is a 59 y.o. female (: 1965) presenting to address:    Chief Complaint   Patient presents with    Cough     For 2 weeks now; Taken OTC Robitussin and Mucinex     Congestion       There were no vitals filed for this visit.    \"Have you been to the ER, urgent care clinic since your last visit?  Hospitalized since your last visit?\"    NO    “Have you seen or consulted any other health care providers outside of [facility] since your last visit?”    NO       Have you had a mammogram?”   NO    Date of last Mammogram: 2023         
does not go away  Treatments: mucinex, robitussin, allergy medication OTC: claritin helped clear her nose      Review of Systems   As stated in HPI, otherwise all others negative.       Objective   Patient-Reported Vitals  No data recorded     Physical Exam             --Radha Rodriguez, LINDA - CNP         
HERNESTO (acute kidney injury)

## 2025-05-12 ENCOUNTER — OFFICE VISIT (OUTPATIENT)
Facility: CLINIC | Age: 60
End: 2025-05-12
Payer: COMMERCIAL

## 2025-05-12 VITALS
HEART RATE: 58 BPM | RESPIRATION RATE: 16 BRPM | WEIGHT: 168 LBS | DIASTOLIC BLOOD PRESSURE: 76 MMHG | HEIGHT: 69 IN | BODY MASS INDEX: 24.88 KG/M2 | SYSTOLIC BLOOD PRESSURE: 123 MMHG | OXYGEN SATURATION: 98 %

## 2025-05-12 DIAGNOSIS — I10 PRIMARY HYPERTENSION: ICD-10-CM

## 2025-05-12 DIAGNOSIS — R73.03 PREDIABETES: ICD-10-CM

## 2025-05-12 DIAGNOSIS — M71.22 SYNOVIAL CYST OF LEFT POPLITEAL SPACE: ICD-10-CM

## 2025-05-12 DIAGNOSIS — E78.2 MIXED HYPERLIPIDEMIA: ICD-10-CM

## 2025-05-12 DIAGNOSIS — Z12.31 ENCOUNTER FOR SCREENING MAMMOGRAM FOR MALIGNANT NEOPLASM OF BREAST: ICD-10-CM

## 2025-05-12 DIAGNOSIS — Z00.00 WELL WOMAN EXAM (NO GYNECOLOGICAL EXAM): Primary | ICD-10-CM

## 2025-05-12 PROCEDURE — 99396 PREV VISIT EST AGE 40-64: CPT | Performed by: NURSE PRACTITIONER

## 2025-05-12 PROCEDURE — 3074F SYST BP LT 130 MM HG: CPT | Performed by: NURSE PRACTITIONER

## 2025-05-12 PROCEDURE — 3078F DIAST BP <80 MM HG: CPT | Performed by: NURSE PRACTITIONER

## 2025-05-12 RX ORDER — CHLORTHALIDONE 25 MG/1
25 TABLET ORAL DAILY
Qty: 90 TABLET | Refills: 1 | Status: SHIPPED | OUTPATIENT
Start: 2025-05-12

## 2025-05-12 RX ORDER — PRAVASTATIN SODIUM 80 MG/1
80 TABLET ORAL DAILY
Qty: 90 TABLET | Refills: 1 | Status: SHIPPED | OUTPATIENT
Start: 2025-05-12

## 2025-05-12 RX ORDER — IRBESARTAN 300 MG/1
300 TABLET ORAL DAILY
Qty: 90 TABLET | Refills: 0 | Status: SHIPPED | OUTPATIENT
Start: 2025-05-12 | End: 2025-05-15 | Stop reason: SDUPTHER

## 2025-05-12 ASSESSMENT — PATIENT HEALTH QUESTIONNAIRE - PHQ9
SUM OF ALL RESPONSES TO PHQ QUESTIONS 1-9: 0
1. LITTLE INTEREST OR PLEASURE IN DOING THINGS: NOT AT ALL
SUM OF ALL RESPONSES TO PHQ QUESTIONS 1-9: 0
2. FEELING DOWN, DEPRESSED OR HOPELESS: NOT AT ALL

## 2025-05-12 NOTE — PATIENT INSTRUCTIONS
much sun, wash your hands, brush your teeth twice a day, and wear a seat belt in the car.   Where can you learn more?  Go to https://www.[employer].net/patientEd and enter P072 to learn more about \"Well Visit, Ages 18 to 65: Care Instructions.\"  Current as of: April 30, 2024  Content Version: 14.4  © 1347-6497 [employer].   Care instructions adapted under license by [employer]. If you have questions about a medical condition or this instruction, always ask your healthcare professional. [employer] disclaims any warranty or liability for your use of this information.

## 2025-05-12 NOTE — PROGRESS NOTES
[address] [place] [address]               [phone]      Yeimy Khan is a 59 y.o. female and presents with Annual Exam (Appetite hasn't been great- losing a lot of weight//Swelling in Left Knee)       Assessment/Plan:    1. Well woman exam (no gynecological exam)  2. Primary hypertension  -     chlorthalidone (HYGROTON) 25 MG tablet; Take 1 tablet by mouth daily, Disp-90 tablet, R-1Normal  -     irbesartan (AVAPRO) 300 MG tablet; Take 1 tablet by mouth daily, Disp-90 tablet, R-0Normal  -     Comprehensive Metabolic Panel; Future  3. Mixed hyperlipidemia  -     pravastatin (PRAVACHOL) 80 MG tablet; Take 1 tablet by mouth daily, Disp-90 tablet, R-1Normal  -     Lipid Panel; Future  4. Synovial cyst of left popliteal space  -     US EXTREMITY JOINT LEFT NON VASC COMPLETE; Future  5. Encounter for screening mammogram for malignant neoplasm of breast  -     [facility] DIGITAL SCREEN W OR WO CAD BILATERAL; Future  6. Prediabetes  -     Hemoglobin A1C; Future  Annual physical completed today  Blood pressure controlled, continue irbesartan and chlorthalidone at the same dose  Lipids controlled, continue preavastatin at the same dose  Noted to have what is most likely a popliteal bakers cyst on the left leg, obtain US for further evaluation  Health maintenance addressed  Refills provided  Labs prior to next visit  Patient verbalized understanding and is in agreement with this plan of care     Assessment & Plan           Follow up and disposition:   Return in about 4 months (around 9/12/2025) for HTN, HLD, 15min, office, w/labsprior.      Subjective:    Labs obtained prior to visit? No  Reviewed with patient? N/A    History of Present Illness  Hypertension:  /76 (BP Site: Left Upper Arm, Patient Position: Sitting, BP Cuff Size: Medium Adult)   Pulse 58   Resp 16   Ht 1.753 m (5' 9\")   Wt 76.2 kg (168 lb)   SpO2 98%   BMI 24.81 kg/m²    Patient reports taking medications as

## 2025-05-12 NOTE — PROGRESS NOTES
Yeimy Khan is a 59 y.o. female (: 1965) presenting to address:    Chief Complaint   Patient presents with    Annual Exam     Appetite hasn't been great- losing a lot of weight    Swelling in Left Knee       Vitals:    25 1447   BP: 123/76   Pulse: 58   Resp: 16   SpO2: 98%       \"Have you been to the ER, urgent care clinic since your last visit?  Hospitalized since your last visit?\"    NO    “Have you seen or consulted any other health care providers outside of [facility] since your last visit?”    NO       Have you had a mammogram?”   NO    Date of last Mammogram: 2023

## 2025-05-15 DIAGNOSIS — I10 PRIMARY HYPERTENSION: ICD-10-CM

## 2025-05-15 RX ORDER — IRBESARTAN 300 MG/1
300 TABLET ORAL DAILY
Qty: 90 TABLET | Refills: 0 | Status: SHIPPED | OUTPATIENT
Start: 2025-05-15

## 2025-05-27 ENCOUNTER — E-VISIT (OUTPATIENT)
Facility: CLINIC | Age: 60
End: 2025-05-27
Payer: COMMERCIAL

## 2025-05-27 DIAGNOSIS — J06.9 VIRAL URI: Primary | ICD-10-CM

## 2025-05-27 PROCEDURE — 99421 OL DIG E/M SVC 5-10 MIN: CPT | Performed by: NURSE PRACTITIONER

## 2025-05-27 ASSESSMENT — LIFESTYLE VARIABLES: SMOKING_STATUS: NO, I'VE NEVER SMOKED

## 2025-05-28 RX ORDER — BENZONATATE 100 MG/1
100 CAPSULE ORAL 2 TIMES DAILY PRN
Qty: 20 CAPSULE | Refills: 1 | Status: SHIPPED | OUTPATIENT
Start: 2025-05-28 | End: 2025-06-17

## 2025-05-28 NOTE — PROGRESS NOTES
Yeimy Khan (1965) initiated an asynchronous digital communication through [employer].    HPI: per patient questionnaire     Exam: not applicable    Diagnoses and all orders for this visit:  Diagnoses and all orders for this visit:    Viral URI  -     benzonatate (TESSALON) 100 MG capsule; Take 1 capsule by mouth 2 times daily as needed for Cough          5 minutes were spent on the digital evaluation and management of this patient.    Radha Rodriguez, APRN - CNP

## 2025-06-10 ENCOUNTER — TELEPHONE (OUTPATIENT)
Facility: CLINIC | Age: 60
End: 2025-06-10

## 2025-06-10 NOTE — TELEPHONE ENCOUNTER
----- Message from Gennaro FADI sent at 6/10/2025  9:15 AM EDT -----  Regarding: ECC Escalation To Practice  ECC Escalation To Practice      Type of Escalation: Acute Care Symptom  --------------------------------------------------------------------------------------------------------------------------    Information for Provider:  Patient is looking for appointment for: Symptom Burning sensation on feet   Reasons for Message: Unable to reach practice     Additional Information patient wanted to schedule her appointment on June 20 th 2025 and her preferred for appointment is morning time.  --------------------------------------------------------------------------------------------------------------------------    Relationship to Patient: Self  Call Back Info: OK to leave message on voicemail  Preferred Call Back Number: Phone [phone]

## 2025-06-16 DIAGNOSIS — K58.0 IRRITABLE BOWEL SYNDROME WITH DIARRHEA: ICD-10-CM

## 2025-06-16 RX ORDER — DICYCLOMINE HYDROCHLORIDE 10 MG/1
10 CAPSULE ORAL 3 TIMES DAILY
Qty: 90 CAPSULE | Refills: 3 | Status: SHIPPED | OUTPATIENT
Start: 2025-06-16

## 2025-06-16 NOTE — TELEPHONE ENCOUNTER
Patient is going to the Urgent Care for her HTN because she can't wait until NP Michael comes back.

## 2025-06-16 NOTE — TELEPHONE ENCOUNTER
Requested Prescriptions     Pending Prescriptions Disp Refills    dicyclomine (BENTYL) 10 MG capsule [Pharmacy Med Name: DICYCLOMINE 10MG CAPSULES] 90 capsule 3     Sig: TAKE 1 CAPSULE BY MOUTH THREE TIMES DAILY     Last seen: 5/27/25  Next seen: None     Last filled: 1/15/25 Qty 90 w/3 refills